# Patient Record
Sex: FEMALE | Race: WHITE | NOT HISPANIC OR LATINO | Employment: UNEMPLOYED | URBAN - METROPOLITAN AREA
[De-identification: names, ages, dates, MRNs, and addresses within clinical notes are randomized per-mention and may not be internally consistent; named-entity substitution may affect disease eponyms.]

---

## 2017-01-13 RX ORDER — CLOPIDOGREL BISULFATE 75 MG/1
75 TABLET ORAL DAILY
Qty: 90 TABLET | Refills: 0 | Status: SHIPPED | OUTPATIENT
Start: 2017-01-13 | End: 2017-04-22

## 2017-01-13 NOTE — TELEPHONE ENCOUNTER
Clopidogrel Bisulfate  75mg           Last Written Prescription Date: 05/24/2016 #90 x 1  Last filled 10/02/2016  Last Office Visit with G, P or [facility] prescribing provider:  04/06/2016 JOSHUA Howard   Future Office Visit:   none    WBC      7.7   1/2/2016  RBC     4.50   1/2/2016  HGB     13.4   1/2/2016  HCT     39.6   1/2/2016  No components found with this name: mct  MCV       88   1/2/2016  MCH     29.8   1/2/2016  MCHC     33.8   1/2/2016  RDW     14.5   1/2/2016  PLT      241   1/2/2016  AST       15   1/2/2016  ALT       16   1/2/2016  CREATININE   Date Value Ref Range Status   01/02/2016 0.87 0.52 - 1.04 mg/dL Final   ]

## 2017-04-24 NOTE — TELEPHONE ENCOUNTER
Clopidogrel  75mg           Last Written Prescription Date: 01/13/2047 #90 x 0  Last filled 01/13/2017  Last Office Visit with G, [facility] or [facility] prescribing provider:  04/02/2016  JOSHAU Howard   Future Office Visit:  none     Lab Results   Component Value Date    WBC 7.7 01/02/2016     Lab Results   Component Value Date    RBC 4.50 01/02/2016     Lab Results   Component Value Date    HGB 13.4 01/02/2016     Lab Results   Component Value Date    HCT 39.6 01/02/2016     No components found for: MCT  Lab Results   Component Value Date    MCV 88 01/02/2016     Lab Results   Component Value Date    MCH 29.8 01/02/2016     Lab Results   Component Value Date    MCHC 33.8 01/02/2016     Lab Results   Component Value Date    RDW 14.5 01/02/2016     Lab Results   Component Value Date     01/02/2016     Lab Results   Component Value Date    AST 15 01/02/2016     Lab Results   Component Value Date    ALT 16 01/02/2016     Creatinine   Date Value Ref Range Status   01/02/2016 0.87 0.52 - 1.04 mg/dL Final   ]

## 2017-04-26 RX ORDER — CLOPIDOGREL BISULFATE 75 MG/1
75 TABLET ORAL DAILY
Qty: 30 TABLET | Refills: 0 | Status: SHIPPED | OUTPATIENT
Start: 2017-04-26 | End: 2017-06-01

## 2017-05-09 DIAGNOSIS — I10 HYPERTENSION GOAL BP (BLOOD PRESSURE) < 130/80: ICD-10-CM

## 2017-05-09 DIAGNOSIS — I63.30 CEREBROVASCULAR ACCIDENT (CVA) DUE TO THROMBOSIS OF CEREBRAL ARTERY (H): ICD-10-CM

## 2017-05-09 NOTE — TELEPHONE ENCOUNTER
Hydralazine 25mg      Last Written Prescription Date: 04/06/2016  #135 x 3  Last filled 01/28/2017    Amlodipine 10mg      Last Written Prescription Date: 04/06/2016 #90 x 3  Last filled 01/28/2017    Last Office Visit with [facility], [facility] or [facility] prescribing provider:  04/06/2016 JOSHUA Howard   Future Office Visit:        BP Readings from Last 3 Encounters:   04/06/16 118/62   01/02/16 157/81   09/30/15 128/60

## 2017-05-10 RX ORDER — AMLODIPINE BESYLATE 10 MG/1
10 TABLET ORAL DAILY
Qty: 90 TABLET | Refills: 3 | Status: SHIPPED | OUTPATIENT
Start: 2017-05-10 | End: 2018-06-01

## 2017-05-10 RX ORDER — HYDRALAZINE HYDROCHLORIDE 25 MG/1
37.5 TABLET, FILM COATED ORAL DAILY
Qty: 135 TABLET | Refills: 3 | Status: SHIPPED | OUTPATIENT
Start: 2017-05-10 | End: 2018-06-01

## 2017-06-01 ENCOUNTER — OFFICE VISIT (OUTPATIENT)
Dept: FAMILY MEDICINE | Facility: CLINIC | Age: 82
End: 2017-06-01
Payer: COMMERCIAL

## 2017-06-01 VITALS
OXYGEN SATURATION: 97 % | HEART RATE: 83 BPM | SYSTOLIC BLOOD PRESSURE: 136 MMHG | TEMPERATURE: 98.2 F | WEIGHT: 235 LBS | BODY MASS INDEX: 40.12 KG/M2 | DIASTOLIC BLOOD PRESSURE: 62 MMHG | HEIGHT: 64 IN

## 2017-06-01 DIAGNOSIS — Z00.00 ROUTINE GENERAL MEDICAL EXAMINATION AT A HEALTH CARE FACILITY: Primary | ICD-10-CM

## 2017-06-01 DIAGNOSIS — Z79.1 LONG TERM (CURRENT) USE OF NON-STEROIDAL ANTI-INFLAMMATORIES (NSAID): ICD-10-CM

## 2017-06-01 DIAGNOSIS — Z23 ENCOUNTER FOR IMMUNIZATION: ICD-10-CM

## 2017-06-01 DIAGNOSIS — L71.9 ROSACEA: ICD-10-CM

## 2017-06-01 DIAGNOSIS — Z13.6 CARDIOVASCULAR SCREENING; LDL GOAL LESS THAN 130: ICD-10-CM

## 2017-06-01 DIAGNOSIS — M47.812 SPONDYLOSIS OF CERVICAL REGION WITHOUT MYELOPATHY OR RADICULOPATHY: ICD-10-CM

## 2017-06-01 DIAGNOSIS — M51.369 DDD (DEGENERATIVE DISC DISEASE), LUMBAR: ICD-10-CM

## 2017-06-01 DIAGNOSIS — R26.9 UNSPECIFIED ABNORMALITIES OF GAIT AND MOBILITY: ICD-10-CM

## 2017-06-01 DIAGNOSIS — I10 HYPERTENSION GOAL BP (BLOOD PRESSURE) < 130/80: ICD-10-CM

## 2017-06-01 DIAGNOSIS — L43.9 LICHEN PLANUS: ICD-10-CM

## 2017-06-01 DIAGNOSIS — Z79.82 LONG TERM (CURRENT) USE OF ASPIRIN: ICD-10-CM

## 2017-06-01 DIAGNOSIS — E66.01 MORBID OBESITY DUE TO EXCESS CALORIES (H): ICD-10-CM

## 2017-06-01 DIAGNOSIS — E55.9 VITAMIN D DEFICIENCY: ICD-10-CM

## 2017-06-01 DIAGNOSIS — Z86.73 PERSONAL HISTORY OF TRANSIENT ISCHEMIC ATTACK (TIA), AND CEREBRAL INFARCTION WITHOUT RESIDUAL DEFICITS: ICD-10-CM

## 2017-06-01 DIAGNOSIS — L85.3 DRY SKIN: ICD-10-CM

## 2017-06-01 DIAGNOSIS — L65.9 HAIR THINNING: ICD-10-CM

## 2017-06-01 LAB
ALBUMIN SERPL-MCNC: 4 G/DL (ref 3.4–5)
ALP SERPL-CCNC: 75 U/L (ref 40–150)
ALT SERPL W P-5'-P-CCNC: 18 U/L (ref 0–50)
ANION GAP SERPL CALCULATED.3IONS-SCNC: 8 MMOL/L (ref 3–14)
AST SERPL W P-5'-P-CCNC: 16 U/L (ref 0–45)
BILIRUB SERPL-MCNC: 0.4 MG/DL (ref 0.2–1.3)
BUN SERPL-MCNC: 26 MG/DL (ref 7–30)
CALCIUM SERPL-MCNC: 9.6 MG/DL (ref 8.5–10.1)
CHLORIDE SERPL-SCNC: 103 MMOL/L (ref 94–109)
CHOLEST SERPL-MCNC: 188 MG/DL
CO2 SERPL-SCNC: 22 MMOL/L (ref 20–32)
CREAT SERPL-MCNC: 1.03 MG/DL (ref 0.52–1.04)
CREAT UR-MCNC: 110 MG/DL
GFR SERPL CREATININE-BSD FRML MDRD: 52 ML/MIN/1.7M2
GLUCOSE SERPL-MCNC: 104 MG/DL (ref 70–99)
HDLC SERPL-MCNC: 59 MG/DL
LDLC SERPL CALC-MCNC: 107 MG/DL
MICROALBUMIN UR-MCNC: 30 MG/L
MICROALBUMIN/CREAT UR: 27 MG/G CR (ref 0–25)
NONHDLC SERPL-MCNC: 129 MG/DL
POTASSIUM SERPL-SCNC: 4.8 MMOL/L (ref 3.4–5.3)
PROT SERPL-MCNC: 7.5 G/DL (ref 6.8–8.8)
SODIUM SERPL-SCNC: 133 MMOL/L (ref 133–144)
TRIGL SERPL-MCNC: 112 MG/DL
TSH SERPL DL<=0.005 MIU/L-ACNC: 0.92 MU/L (ref 0.4–4)

## 2017-06-01 PROCEDURE — 82043 UR ALBUMIN QUANTITATIVE: CPT | Performed by: NURSE PRACTITIONER

## 2017-06-01 PROCEDURE — 80061 LIPID PANEL: CPT | Performed by: NURSE PRACTITIONER

## 2017-06-01 PROCEDURE — 82306 VITAMIN D 25 HYDROXY: CPT | Performed by: NURSE PRACTITIONER

## 2017-06-01 PROCEDURE — G0009 ADMIN PNEUMOCOCCAL VACCINE: HCPCS | Performed by: NURSE PRACTITIONER

## 2017-06-01 PROCEDURE — 84443 ASSAY THYROID STIM HORMONE: CPT | Performed by: NURSE PRACTITIONER

## 2017-06-01 PROCEDURE — 90670 PCV13 VACCINE IM: CPT | Performed by: NURSE PRACTITIONER

## 2017-06-01 PROCEDURE — 36415 COLL VENOUS BLD VENIPUNCTURE: CPT | Performed by: NURSE PRACTITIONER

## 2017-06-01 PROCEDURE — G0438 PPPS, INITIAL VISIT: HCPCS | Performed by: NURSE PRACTITIONER

## 2017-06-01 PROCEDURE — 80053 COMPREHEN METABOLIC PANEL: CPT | Performed by: NURSE PRACTITIONER

## 2017-06-01 RX ORDER — LOSARTAN POTASSIUM 100 MG/1
100 TABLET ORAL DAILY
Qty: 90 TABLET | Refills: 3 | Status: SHIPPED | OUTPATIENT
Start: 2017-06-01 | End: 2018-06-26

## 2017-06-01 RX ORDER — CLOPIDOGREL BISULFATE 75 MG/1
75 TABLET ORAL DAILY
Qty: 90 TABLET | Refills: 3 | Status: SHIPPED | OUTPATIENT
Start: 2017-06-01 | End: 2018-06-26

## 2017-06-01 RX ORDER — ROSUVASTATIN CALCIUM 5 MG/1
2.5 TABLET, COATED ORAL EVERY EVENING
Qty: 90 TABLET | Refills: 3 | Status: SHIPPED | OUTPATIENT
Start: 2017-06-01 | End: 2018-07-17

## 2017-06-01 NOTE — PATIENT INSTRUCTIONS

## 2017-06-01 NOTE — MR AVS SNAPSHOT
After Visit Summary   6/1/2017    Bárbara Vuong    MRN: [id number]           Patient Information     Date Of Birth          1935        Visit Information        Provider Department      6/1/2017 9:20 AM Martha Howard APRN [facility]        Today's Diagnoses     Routine general medical examination at a health care facility    -  1    Carotid stenosis with cerebral infarction less than 8 weeks ago        Encounter for immunization        CARDIOVASCULAR SCREENING; LDL GOAL LESS THAN 130        Hypertension goal BP (blood pressure) < 130/80        Rosacea        DDD (degenerative disc disease), lumbar        Vitamin D deficiency        Morbid obesity due to excess calories (H)        Unspecified abnormalities of gait and mobility        Long term (current) use of non-steroidal anti-inflammatories (nsaid)        Lichen planus        Long term (current) use of aspirin        Personal history of transient ischemic attack (TIA), and cerebral infarction without residual deficits        Spondylosis of cervical region without myelopathy or radiculopathy        Hair thinning        Dry skin          Care Instructions      Preventive Health Recommendations    Female Ages 65 +    Yearly exam:     See your health care provider every year in order to  o Review health changes.   o Discuss preventive care.    o Review your medicines if your doctor has prescribed any.      You no longer need a yearly Pap test unless you've had an abnormal Pap test in the past 10 years. If you have vaginal symptoms, such as bleeding or discharge, be sure to talk with your provider about a Pap test.      Every 1 to 2 years, have a mammogram.  If you are over 69, talk with your health care provider about whether or not you want to continue having screening mammograms.      Every 10 years, have a colonoscopy. Or, have a yearly FIT test (stool test). These exams will check for colon cancer.       Have a  cholesterol test every 5 years, or more often if your doctor advises it.       Have a diabetes test (fasting glucose) every three years. If you are at risk for diabetes, you should have this test more often.       At age 65, have a bone density scan (DEXA) to check for osteoporosis (brittle bone disease).    Shots:    Get a flu shot each year.    Get a tetanus shot every 10 years.    Talk to your doctor about your pneumonia vaccines. There are now two you should receive - Pneumovax (PPSV 23) and Prevnar (PCV 13).    Talk to your doctor about the shingles vaccine.    Talk to your doctor about the hepatitis B vaccine.    Nutrition:     Eat at least 5 servings of fruits and vegetables each day.      Eat whole-grain bread, whole-wheat pasta and brown rice instead of white grains and rice.      Talk to your provider about Calcium and Vitamin D.     Lifestyle    Exercise at least 150 minutes a week (30 minutes a day, 5 days a week). This will help you control your weight and prevent disease.      Limit alcohol to one drink per day.      No smoking.       Wear sunscreen to prevent skin cancer.       See your dentist twice a year for an exam and cleaning.      See your eye doctor every 1 to 2 years to screen for conditions such as glaucoma, macular degeneration and cataracts.    You are doing great.      Keep going on vacations.     No change with medication     You did get your 2nd pneumonia PCV 13 shot.     Stay well hydrated - urine should be clear.      Keep moving           Follow-ups after your visit        Who to contact     Normal or non-critical lab and imaging results will be communicated to you by [employer], letter or phone within 4 business days after the clinic has received the results. If you do not hear from us within 7 days, please contact the clinic through [employer] or phone. If you have a critical or abnormal lab result, we will notify you by phone as soon as possible.  Submit refill requests through kooldiner  "or call your pharmacy and they will forward the refill request to us. Please allow 3 business days for your refill to be completed.          If you need to speak with a  for additional information , please call: [phone]           Additional Information About Your Visit        Variable Information     Variable lets you send messages to your doctor, view your test results, renew your prescriptions, schedule appointments and more. To sign up, go to [email or web address] . Click on \"Log in\" on the left side of the screen, which will take you to the Welcome page. Then click on \"Sign up Now\" on the right side of the page.     You will be asked to enter the access code listed below, as well as some personal information. Please follow the directions to create your username and password.     Your access code is: [email or web address]  Expires: 2017 10:58 AM     Your access code will  in 90 days. If you need help or a new code, please call your [place] clinic or [phone].        Care EveryWhere ID     This is your Care EveryWhere ID. This could be used by other organizations to access your [place] medical records  [id number]        Your Vitals Were     Pulse Temperature Height Pulse Oximetry BMI (Body Mass Index)       83 98.2  F (36.8  C) (Tympanic) 5' 4\" (1.626 m) 97% 40.34 kg/m2        Blood Pressure from Last 3 Encounters:   17 136/62   16 118/62   16 157/81    Weight from Last 3 Encounters:   17 235 lb (106.6 kg)   16 212 lb (96.2 kg)   16 230 lb (104.3 kg)              We Performed the Following     Albumin Random Urine Quantitative     Comprehensive metabolic panel     Lipid Profile with reflex to direct LDL     PNEUMOCOCCAL CONJ VACCINE 13 VALENT IM     TSH with free T4 reflex     Vitamin D Deficiency          Today's Medication Changes          These changes are accurate as of: 17 10:58 AM.  If you have any questions, ask your nurse or doctor.    "            These medicines have changed or have updated prescriptions.        Dose/Directions    ALEVE 220 MG tablet   This may have changed:  See the new instructions.   Used for:  Other specified pre-operative examination, Generalized osteoarthrosis, unspecified site   Generic drug:  naproxen sodium        2 TABLETS QAM AND QPM   Refills:  0       clopidogrel 75 MG tablet   Commonly known as:  PLAVIX   This may have changed:  additional instructions   Used for:  Carotid stenosis with cerebral infarction less than 8 weeks ago   Changed by:  Martha Howard APRN CNP        Dose:  75 mg   Take 1 tablet (75 mg) by mouth daily   Quantity:  90 tablet   Refills:  3            Where to get your medicines      These medications were sent to [facility] [address] [facility] [address] [facility]  [address] [place] [id number]     Phone:  [phone]     clopidogrel 75 MG tablet    losartan 100 MG tablet    rosuvastatin 5 MG tablet                Primary Care Provider Office Phone # Fax #    CHRIS Garrett [phone]       [facility] [address] [facility]   [facility] [address]        Thank you!     Thank you for choosing [facility]  for your care. Our goal is always to provide you with excellent care. Hearing back from our patients is one way we can continue to improve our services. Please take a few minutes to complete the written survey that you may receive in the mail after your visit with us. Thank you!             Your Updated Medication List - Protect others around you: Learn how to safely use, store and throw away your medicines at www.disposemymeds.org.          This list is accurate as of: 6/1/17 10:58 AM.  Always use your most recent med list.                   Brand Name Dispense Instructions for use    ALEVE 220 MG tablet   Generic drug:  naproxen sodium      2 TABLETS QAM AND QPM       amLODIPine 10 MG tablet    NORVASC    90  tablet    Take 1 tablet (10 mg) by mouth daily       aspirin 325 MG EC tablet     100 tablet    Take 1 tablet by mouth daily.       cetirizine 5 MG tablet    zyrTEC     Take 1 tablet (5 mg) by mouth daily       clopidogrel 75 MG tablet    PLAVIX    90 tablet    Take 1 tablet (75 mg) by mouth daily       COLACE 100 MG capsule   Generic drug:  docusate sodium      2 CAPSULES DAILY       desoximetasone 0.25 % cream    TOPICORT    30 g    Apply topically 2 times daily       hydrALAZINE 25 MG tablet    APRESOLINE    135 tablet    Take 1.5 tablets (37.5 mg) by mouth daily       losartan 100 MG tablet    COZAAR    90 tablet    Take 1 tablet (100 mg) by mouth daily       rosuvastatin 5 MG tablet    CRESTOR    90 tablet    Take 0.5 tablets (2.5 mg) by mouth every evening

## 2017-06-01 NOTE — TELEPHONE ENCOUNTER
Clopidogrel  75mg           Last Written Prescription Date: 04/26/2017   #30  x0  Last filled 04/26/2017  Last Office Visit with G, [facility] or [facility] prescribing provider:  06/01/2017 JOSHUA Howard   Future Office Visit:   none    Lab Results   Component Value Date    WBC 7.7 01/02/2016     Lab Results   Component Value Date    RBC 4.50 01/02/2016     Lab Results   Component Value Date    HGB 13.4 01/02/2016     Lab Results   Component Value Date    HCT 39.6 01/02/2016     No components found for: MCT  Lab Results   Component Value Date    MCV 88 01/02/2016     Lab Results   Component Value Date    MCH 29.8 01/02/2016     Lab Results   Component Value Date    MCHC 33.8 01/02/2016     Lab Results   Component Value Date    RDW 14.5 01/02/2016     Lab Results   Component Value Date     01/02/2016     Lab Results   Component Value Date    AST 15 01/02/2016     Lab Results   Component Value Date    ALT 16 01/02/2016     Creatinine   Date Value Ref Range Status   01/02/2016 0.87 0.52 - 1.04 mg/dL Final   ]

## 2017-06-01 NOTE — PROGRESS NOTES
SUBJECTIVE:                                                            Bárbara Vuong is a 82 year old female who presents for Preventive Visit.      Are you in the first 12 months of your Medicare Part B coverage?  No    Healthy Habits:    Do you get at least three servings of calcium containing foods daily (dairy, green leafy vegetables, etc.)? yes    Amount of exercise or daily activities, outside of work: minimal    Problems taking medications regularly No    Medication side effects: No    Have you had an eye exam in the past two years? no    Do you see a dentist twice per year? 1/year    Do you have sleep apnea, excessive snoring or daytime drowsiness?no    COGNITIVE SCREEN  1) Repeat 3 items (Banana, Sunrise, Chair)    2) Clock draw:   3) 3 item recall: Recalls 3 objects  Results: 3 items recalled: COGNITIVE IMPAIRMENT LESS LIKELY    Mini-CogTM Copyright S Rosa. Licensed by the author for use in [facility]; reprinted with permission ([email or web address]). All rights reserved.            *C/o lower arm pain when she uses her walker    Reviewed and updated as needed this visit by clinical staff  Tobacco  Allergies  Meds  Problems  Med Hx  Surg Hx  Fam Hx  Soc Hx          Reviewed and updated as needed this visit by Provider  Meds  Problems        Social History   Substance Use Topics     Smoking status: Former Smoker     Packs/day: 1.00     Years: 7.00     Types: Cigarettes     Quit date: 10/26/1987     Smokeless tobacco: Never Used     Alcohol use 4.2 oz/week     7 Standard drinks or equivalent per week      Comment: one drink per night       The patient does not drink >3 drinks per day nor >7 drinks per week.    Today's PHQ-2 Score:   PHQ-2 ( 1999 Pfizer) 6/1/2017 4/6/2016   Q1: Little interest or pleasure in doing things 0 1   Q2: Feeling down, depressed or hopeless 0 0   PHQ-2 Score 0 1       Do you feel safe in your environment - Yes    Do you have a Health Care Directive?: Yes: Patient  [facility] has Advance Directive and will bring in a copy to clinic.    Current providers sharing in care for this patient include:   Patient Care Team:  Martha Howard APRN CNP as PCP - General  Aramis Ballard MD as Referring Physician (Neurology)  Dolores Almonte LSW as   Cathy Krause, RN as   Anel Cabrera, RN as Nurse Coordinator (Neurology)      Hearing impairment: No    Ability to successfully perform activities of daily living: Yes, no assistance needed     Fall risk:  Fallen 2 or more times in the past year?: Yes  Any fall with injury in the past year?: No  Timed Up and Go Test (>13.5 is fall risk; contact physician) :  (unable)    Home safety:  none identified      The following health maintenance items are reviewed in Epic and correct as of today:  Health Maintenance   Topic Date Due     DEXA SCAN SCREENING (SYSTEM ASSIGNED)  02/11/2002     PNEUMOCOCCAL (2 of 2 - PCV13) 01/24/2004     MICROALBUMIN Q1 YEAR  12/11/2013     FALL RISK ASSESSMENT  06/12/2016     INFLUENZA VACCINE (SYSTEM ASSIGNED)  09/01/2017     ADVANCE DIRECTIVE PLANNING Q5 YRS  01/08/2018     TETANUS IMMUNIZATION (SYSTEM ASSIGNED)  07/01/2018         Pneumonia Vaccine:Adults age 65+ who received Pneumovax (PPSV23) at 65 years or older: Should be given PCV13 > 1 year after their most recent PPSV23     ROS:  C: NEGATIVE for fever, chills, change in weight  INTEGUMENTARY/SKIN: NEGATIVE for worrisome rashes, moles or lesions.  Does have skin tags. Hair is thinning   E/M: NEGATIVE for ear, mouth and throat problems  R: NEGATIVE for significant cough or SOB  CV: NEGATIVE for chest pain, palpitations or peripheral edema  GI: NEGATIVE for nausea, abdominal pain, heartburn, or change in bowel habits  : no urinary problems   MUSCULOSKELETAL: POSITIVE  for arthralgias does have her house set up with railings,  Does have an elevator chair and has a walk in shower. Uses a walker.  Does have people who come  in to help her,  Kids come to visit over the week-end .  Does have bilat knee problems   NEURO: NEGATIVE for weakness, dizziness or paresthesias  ENDOCRINE: NEGATIVE for temperature intolerance, skin/hair changes and POSITIVE  for hair loss and hair thinning.    HEME/ALLERGY/IMMUNE: NEGATIVE for bleeding problems  PSYCHIATRIC: NEGATIVE for changes in mood or affect and put her  in a nursing home as his Alzheimer is getting worse. She and her granddaughter went on a cruise this past winter    Problem list, Medication list, Allergies, and Medical/Social/Surgical histories reviewed in [facility] and updated as appropriate.  Labs reviewed in EPIC  BP Readings from Last 3 Encounters:   06/01/17 136/62   04/06/16 118/62   01/02/16 157/81    Wt Readings from Last 3 Encounters:   06/01/17 235 lb (106.6 kg)   04/06/16 212 lb (96.2 kg)   01/01/16 230 lb (104.3 kg)                  Patient Active Problem List   Diagnosis     GENERAL OSTEOARTHROSIS     CARDIOVASCULAR SCREENING; LDL GOAL LESS THAN 130     Hypertension goal BP (blood pressure) < 130/80     Rosacea     Lichen planus     Health Care Home     Advanced directives, HC info packet sent 1/8/2013     DDD (degenerative disc disease), lumbar     Morbidly obese (H)     Vitamin D deficiency     History of colonic polyps     Stroke (H)     Carotid stenosis with cerebral infarction less than 8 weeks ago     Carotid stenosis     Stroke, embolic (H)     Long term (current) use of aspirin     Unspecified abnormalities of gait and mobility     Long term (current) use of non-steroidal anti-inflammatories (nsaid)     Long term (current) use of antithrombotics/antiplatelets     Arthrodesis status     Presence of right artificial hip joint     Personal history of transient ischemic attack (TIA), and cerebral infarction without residual deficits     Presence of left artificial shoulder joint     Spondylosis of cervical region without myelopathy or radiculopathy     Presence of right  artificial shoulder joint     Past Surgical History:   Procedure Laterality Date     C NONSPECIFIC PROCEDURE      back surgery     C NONSPECIFIC PROCEDURE      elbow surgery     C NONSPECIFIC PROCEDURE      hip replacement, right     C NONSPECIFIC PROCEDURE      fx elbow       Social History   Substance Use Topics     Smoking status: Former Smoker     Packs/day: 1.00     Years: 7.00     Types: Cigarettes     Quit date: 10/26/1987     Smokeless tobacco: Never Used     Alcohol use 4.2 oz/week     7 Standard drinks or equivalent per week      Comment: one drink per night     Family History   Problem Relation Age of Onset     C.A.D. Mother      Hypertension Mother      AGE RELATED         Current Outpatient Prescriptions   Medication Sig Dispense Refill     clopidogrel (PLAVIX) 75 MG tablet Take 1 tablet (75 mg) by mouth daily 90 tablet 3     amLODIPine (NORVASC) 10 MG tablet Take 1 tablet (10 mg) by mouth daily 90 tablet 3     rosuvastatin (CRESTOR) 5 MG tablet Take 0.5 tablets (2.5 mg) by mouth every evening 90 tablet 1     losartan (COZAAR) 100 MG tablet Take 1 tablet (100 mg) by mouth daily 90 tablet 2     aspirin 325 MG EC tablet Take 1 tablet by mouth daily. 100 tablet 3     ALEVE 220 MG OR TABS 2 TABLETS QAM AND QPM (Patient taking differently: 1-2 TABLETS twice daily prn for pain)       COLACE 100 MG OR CAPS 2 CAPSULES DAILY       hydrALAZINE (APRESOLINE) 25 MG tablet Take 1.5 tablets (37.5 mg) by mouth daily 135 tablet 3     [DISCONTINUED] clopidogrel (PLAVIX) 75 MG tablet Take 1 tablet (75 mg) by mouth daily (Needs follow-up appointment for this medication) 30 tablet 0     desoximetasone (TOPICORT) 0.25 % cream Apply topically 2 times daily (Patient not taking: Reported on 6/1/2017) 30 g 0     cetirizine (ZYRTEC) 5 MG tablet Take 1 tablet (5 mg) by mouth daily (Patient not taking: Reported on 6/1/2017)       Allergies   Allergen Reactions     Penicillins      OBJECTIVE:                                           "                  /62  Pulse 83  Temp 98.2  F (36.8  C) (Tympanic)  Ht 5' 4\" (1.626 m)  Wt 235 lb (106.6 kg)  SpO2 97%  BMI 40.34 kg/m2 Estimated body mass index is 40.34 kg/(m^2) as calculated from the following:    Height as of this encounter: 5' 4\" (1.626 m).    Weight as of this encounter: 235 lb (106.6 kg).  EXAM:   GENERAL APPEARANCE: healthy, alert and no distress  EYES: Eyes grossly normal to inspection, PERRL and conjunctivae and sclerae normal  HENT: ear canals and TM's normal, nose and mouth without ulcers or lesions, oropharynx clear and oral mucous membranes moist  NECK: no adenopathy, no asymmetry, masses, or scars and thyroid normal to palpation  RESP: lungs clear to auscultation - no rales, rhonchi or wheezes  BREAST: normal without masses, tenderness or nipple discharge and no palpable axillary masses or adenopathy  CV: regular rate and rhythm, normal S1 S2, no S3 or S4, no murmur, click or rub, no peripheral edema and peripheral pulses strong  ABDOMEN: soft, nontender, no hepatosplenomegaly, no masses and bowel sounds normal   (female): normal female external genitalia, normal urethral meatus, vaginal mucosal atrophy noted, normal cervix, adnexae, and uterus without masses or abnormal discharge  MS: no musculoskeletal defects are noted and gait is age appropriate without ataxia  SKIN: no suspicious lesions or rashes  NEURO: Normal strength and tone, sensory exam grossly normal, mentation intact, speech normal, cranial nerves 2-12 intact and oriented times 3  PSYCH: mentation appears normal and affect normal/bright    ASSESSMENT / PLAN:                                                              ASSESSMENT/PLAN:      ICD-10-CM    1. Routine general medical examination at a health care facility Z00.00    2. Carotid stenosis with cerebral infarction less than 8 weeks ago I69.30 clopidogrel (PLAVIX) 75 MG tablet     Lipid Profile with reflex to direct LDL     Comprehensive metabolic " panel   3. Encounter for immunization Z23 PNEUMOCOCCAL CONJ VACCINE 13 VALENT IM   4. CARDIOVASCULAR SCREENING; LDL GOAL LESS THAN 130 Z13.6 Lipid Profile with reflex to direct LDL     rosuvastatin (CRESTOR) 5 MG tablet   5. Hypertension goal BP (blood pressure) < 130/80 I10 Comprehensive metabolic panel     Albumin Random Urine Quantitative     losartan (COZAAR) 100 MG tablet   6. Rosacea L71.9    7. DDD (degenerative disc disease), lumbar M51.36    8. Vitamin D deficiency E55.9 Vitamin D Deficiency   9. Morbid obesity due to excess calories (H) E66.01    10. Unspecified abnormalities of gait and mobility R26.9    11. Long term (current) use of non-steroidal anti-inflammatories (nsaid) Z79.1    12. Lichen planus L43.9    13. Long term (current) use of aspirin Z79.82    14. Personal history of transient ischemic attack (TIA), and cerebral infarction without residual deficits Z86.73    15. Spondylosis of cervical region without myelopathy or radiculopathy M47.812    16. Hair thinning L65.9 TSH with free T4 reflex   17. Dry skin L85.3 TSH with free T4 reflex       Patient Instructions     Preventive Health Recommendations    Female Ages 65 +    Yearly exam:     See your health care provider every year in order to  o Review health changes.   o Discuss preventive care.    o Review your medicines if your doctor has prescribed any.      You no longer need a yearly Pap test unless you've had an abnormal Pap test in the past 10 years. If you have vaginal symptoms, such as bleeding or discharge, be sure to talk with your provider about a Pap test.      Every 1 to 2 years, have a mammogram.  If you are over 69, talk with your health care provider about whether or not you want to continue having screening mammograms.      Every 10 years, have a colonoscopy. Or, have a yearly FIT test (stool test). These exams will check for colon cancer.       Have a cholesterol test every 5 years, or more often if your doctor advises it.  "      Have a diabetes test (fasting glucose) every three years. If you are at risk for diabetes, you should have this test more often.       At age 65, have a bone density scan (DEXA) to check for osteoporosis (brittle bone disease).    Shots:    Get a flu shot each year.    Get a tetanus shot every 10 years.    Talk to your doctor about your pneumonia vaccines. There are now two you should receive - Pneumovax (PPSV 23) and Prevnar (PCV 13).    Talk to your doctor about the shingles vaccine.    Talk to your doctor about the hepatitis B vaccine.    Nutrition:     Eat at least 5 servings of fruits and vegetables each day.      Eat whole-grain bread, whole-wheat pasta and brown rice instead of white grains and rice.      Talk to your provider about Calcium and Vitamin D.     Lifestyle    Exercise at least 150 minutes a week (30 minutes a day, 5 days a week). This will help you control your weight and prevent disease.      Limit alcohol to one drink per day.      No smoking.       Wear sunscreen to prevent skin cancer.       See your dentist twice a year for an exam and cleaning.      See your eye doctor every 1 to 2 years to screen for conditions such as glaucoma, macular degeneration and cataracts.    You are doing great.      Keep going on vacations.     No change with medication     You did get your 2nd pneumonia PCV 13 shot.     Stay well hydrated - urine should be clear.      Keep moving             End of Life Planning:  Patient currently has an advanced directive: Yes.  Practitioner is supportive of decision.    COUNSELING:  Reviewed preventive health counseling, as reflected in patient instructions       Regular exercise       Healthy diet/nutrition       Vision screening       Advanced Planning         Estimated body mass index is 40.34 kg/(m^2) as calculated from the following:    Height as of this encounter: 5' 4\" (1.626 m).    Weight as of this encounter: 235 lb (106.6 kg).  Weight management plan: Patient " was referred to their PCP to discuss a diet and exercise plan.   reports that she quit smoking about 29 years ago. Her smoking use included Cigarettes. She has a 7.00 pack-year smoking history. She has never used smokeless tobacco.      Appropriate preventive services were discussed with this patient, including applicable screening as appropriate for cardiovascular disease, diabetes, osteopenia/osteoporosis, and glaucoma.  As appropriate for age/gender, discussed screening for colorectal cancer, prostate cancer, breast cancer, and cervical cancer. Checklist reviewing preventive services available has been given to the patient.    Reviewed patients plan of care and provided an AVS. The Intermediate Care Plan ( asthma action plan, low back pain action plan, and migraine action plan) for Bárbara meets the Care Plan requirement. This Care Plan has been established and reviewed with the Patient.    Counseling Resources:  ATP IV Guidelines  Pooled Cohorts Equation Calculator  Breast Cancer Risk Calculator  FRAX Risk Assessment  ICSI Preventive Guidelines  Dietary Guidelines for Americans, 2010  USDA's MyPlate  ASA Prophylaxis  Lung CA Screening    HIREN NOLAN NP, APRN CNP  [facility]

## 2017-06-01 NOTE — LETTER
"[facility]  [address] [place] [address]  [id number]  [phone]      June 12, 2017      Bárbaraeyad Vuong  [address] [address]              Dear MsIsrael Vuong,    The results of your recent kidney test, liver tests, thyroid tests (checks thyroid function and body metabolism), electrolytes (sodium, potassium, etc.) This measures body salts which are affected by medications and kidney function., cholesterol , HDL \"Good Cholesterol\", triglycerides } were normal.     The results of your recent glucose (a screening test for diabetes), LDL \"Bad Cholesterol\"  , urine microalbumin, Vitamin D were  abnormal.     Your glucose and LDL are just out of normal range.  =  Limit the simple sugars and the simple carbohydrates     Your Vitamin D level is low please  Please get an over the counter Vitamin D supplement of 2000 units. Take 4000 units during the fall/winter months and 2000 units during the spring/summer.       Please note that test explanations are brief and do not reflect all diagnostic uses.     Please make a follow-up appointment if you have additional questions.       Sincerely,    KATIE Chen/GABINO CMA    "

## 2017-06-02 LAB — DEPRECATED CALCIDIOL+CALCIFEROL SERPL-MC: 16 UG/L (ref 20–75)

## 2017-06-02 RX ORDER — CLOPIDOGREL BISULFATE 75 MG/1
TABLET ORAL
Qty: 30 TABLET | Refills: 0 | OUTPATIENT
Start: 2017-06-02

## 2018-06-01 DIAGNOSIS — I10 HYPERTENSION GOAL BP (BLOOD PRESSURE) < 130/80: ICD-10-CM

## 2018-06-01 DIAGNOSIS — I63.30 CEREBROVASCULAR ACCIDENT (CVA) DUE TO THROMBOSIS OF CEREBRAL ARTERY (H): ICD-10-CM

## 2018-06-04 RX ORDER — AMLODIPINE BESYLATE 10 MG/1
TABLET ORAL
Qty: 90 TABLET | Refills: 1 | Status: SHIPPED | OUTPATIENT
Start: 2018-06-04 | End: 2018-12-09

## 2018-06-04 RX ORDER — HYDRALAZINE HYDROCHLORIDE 25 MG/1
TABLET, FILM COATED ORAL
Qty: 135 TABLET | Refills: 1 | Status: SHIPPED | OUTPATIENT
Start: 2018-06-04 | End: 2018-12-24

## 2018-06-04 NOTE — TELEPHONE ENCOUNTER
Prescription approved per [facility] Refill Protocol or patient Primary care provider (PCP)  KIP Ortega RN/Juve Sher

## 2018-06-04 NOTE — TELEPHONE ENCOUNTER
"Requested Prescriptions   Pending Prescriptions Disp Refills     hydrALAZINE (APRESOLINE) 25 MG tablet [Pharmacy Med Name: HydrALAZINE HCl Oral Tablet 25 MG] 135 tablet 2    Last Written Prescription Date:  05/10/2017 #135 x 3  Last filled 02/13/2018  Last office visit: 6/1/2017 JOSHUA Howard   Future Office Visit:  None   Sig: TAKE 1.5 TABLETS BY MOUTH ONCE DAILY.    There is no refill protocol information for this order        amLODIPine (NORVASC) 10 MG tablet [Pharmacy Med Name: AmLODIPine Besylate Oral Tablet 10 MG] 90 tablet 2    Last Written Prescription Date:  05/10/2017 #90 x 3  Last filled 02/13/2018  Last office visit: 6/1/2017 JOSHUA Howard   Future Office Visit:  None   Sig: TAKE ONE TABLET BY MOUTH ONE TIME DAILY    Calcium Channel Blockers Protocol  Failed    6/1/2018  8:16 PM       Failed - Blood pressure under 140/90 in past 12 months    BP Readings from Last 3 Encounters:   06/01/17 136/62   04/06/16 118/62   01/02/16 157/81                Passed - Recent (12 mo) or future (30 days) visit within the authorizing provider's specialty    Patient had office visit in the last 12 months or has a visit in the next 30 days with authorizing provider or within the authorizing provider's specialty.  See \"Patient Info\" tab in inbasket, or \"Choose Columns\" in Meds & Orders section of the refill encounter.           Passed - Patient is age 18 or older       Passed - No active pregnancy on record       Passed - Normal serum creatinine on file in past 12 months    Recent Labs   Lab Test  06/01/17   1109   06/30/15   1459   CR  1.03   < >   --    CRPOC   --    --   1.1    < > = values in this interval not displayed.            Passed - No positive pregnancy test in past 12 months          "

## 2018-06-26 DIAGNOSIS — I10 HYPERTENSION GOAL BP (BLOOD PRESSURE) < 130/80: ICD-10-CM

## 2018-06-26 DIAGNOSIS — I63.9 STROKE, EMBOLIC (H): ICD-10-CM

## 2018-06-26 DIAGNOSIS — I63.9 STROKE (H): Primary | ICD-10-CM

## 2018-06-26 DIAGNOSIS — I65.29 CAROTID STENOSIS: ICD-10-CM

## 2018-06-26 DIAGNOSIS — Z79.02 LONG TERM (CURRENT) USE OF ANTITHROMBOTICS/ANTIPLATELETS: ICD-10-CM

## 2018-06-27 RX ORDER — CLOPIDOGREL BISULFATE 75 MG/1
TABLET ORAL
Qty: 30 TABLET | Refills: 0 | Status: SHIPPED | OUTPATIENT
Start: 2018-06-27 | End: 2018-07-25

## 2018-06-27 RX ORDER — LOSARTAN POTASSIUM 100 MG/1
TABLET ORAL
Qty: 30 TABLET | Refills: 0 | Status: SHIPPED | OUTPATIENT
Start: 2018-06-27 | End: 2018-08-02

## 2018-06-27 NOTE — TELEPHONE ENCOUNTER
Medication is being filled for 1 time refill only due to:   Over due for office visit and/or labs   KIP Ortega  RN/Juve Sher

## 2018-07-06 ENCOUNTER — TELEPHONE (OUTPATIENT)
Dept: FAMILY MEDICINE | Facility: CLINIC | Age: 83
End: 2018-07-06

## 2018-07-06 DIAGNOSIS — H10.33 ACUTE BACTERIAL CONJUNCTIVITIS OF BOTH EYES: Primary | ICD-10-CM

## 2018-07-06 RX ORDER — POLYMYXIN B SULFATE AND TRIMETHOPRIM 1; 10000 MG/ML; [USP'U]/ML
1 SOLUTION OPHTHALMIC
Qty: 1 BOTTLE | Refills: 0 | Status: SHIPPED | OUTPATIENT
Start: 2018-07-06 | End: 2018-07-13

## 2018-07-06 NOTE — TELEPHONE ENCOUNTER
I spoke to Bárbara.  Her left eye has been draining a bit and is itchy and irritated. Her PCA told her she has pink eye because the left one is red and irritated looking. . Bárbara says she has allergies and sometimes her eyes are bothered by that. . Her eye lid is slightly swollen and she does have some goop coming out of it.    Patient denies fever. She is wondering if she should have some eye drops for it. She is open to having a telephone visit with Martha. [facility]ses [facility].    I also let her know she could try moist compresses to the eye four times daily. Lynn Green RN

## 2018-07-06 NOTE — TELEPHONE ENCOUNTER
Chief Complaint   Patient presents with    Hypertension     I have reviewed the patient's medical history in detail and updated the computerized patient record. Health Maintenance reviewed. 1. Have you been to the ER, urgent care clinic since your last visit? Hospitalized since your last visit?no    2. Have you seen or consulted any other health care providers outside of the [address] [place] [address] since your last visit? Include any pap smears or colon screening.  No    Encouraged pt to discuss pt's wishes with spouse/partner/family and bring them in the next appt to follow thru with the Advanced Directive Pt states her PCAs told her she has pink eye. She is requesting advice from Martha, there are no open appts.  Please triage.      Carlita Ricks, [place]

## 2018-07-17 DIAGNOSIS — Z13.6 CARDIOVASCULAR SCREENING; LDL GOAL LESS THAN 130: ICD-10-CM

## 2018-07-18 RX ORDER — ROSUVASTATIN CALCIUM 5 MG/1
TABLET, COATED ORAL
Qty: 45 TABLET | Refills: 0 | Status: SHIPPED | OUTPATIENT
Start: 2018-07-18 | End: 2018-08-02

## 2018-07-18 NOTE — TELEPHONE ENCOUNTER
Medication is being filled for 1 time refill only due to:  Patient needs to be seen because it has been more than one year since last visit. Lynn Green RN

## 2018-07-18 NOTE — TELEPHONE ENCOUNTER
"Requested Prescriptions   Pending Prescriptions Disp Refills     rosuvastatin (CRESTOR) 5 MG tablet [Pharmacy Med Name: Rosuvastatin Calcium Oral Tablet 5 MG] 45 tablet 2    Last Written Prescription Date:  6/1/17  Last Fill Quantity: 90,  # refills: 3   Last office visit: 6/1/2017 with prescribing provider:  6/1/17 phil   Future Office Visit:   Next 5 appointments (look out 90 days)     Aug 02, 2018  9:20 AM CDT   PHYSICAL with CHRIS Garrett CNP   [facility]    [address] [id number] [phone]                  Sig: TAKE 0.5 TABLETS(2.5MG) BY MOUTH EVERY evening    Statins Protocol Failed    7/17/2018  4:46 PM       Failed - LDL on file in past 12 months    Recent Labs   Lab Test  06/01/17   1109   LDL  107*            Passed - No abnormal creatine kinase in past 12 months    No lab results found.            Passed - Recent (12 mo) or future (30 days) visit within the authorizing provider's specialty    Patient had office visit in the last 12 months or has a visit in the next 30 days with authorizing provider or within the authorizing provider's specialty.  See \"Patient Info\" tab in inbasket, or \"Choose Columns\" in Meds & Orders section of the refill encounter.           Passed - Patient is age 18 or older       Passed - No active pregnancy on record       Passed - No positive pregnancy test in past 12 months          "

## 2018-07-25 DIAGNOSIS — I65.29 CAROTID STENOSIS: ICD-10-CM

## 2018-07-25 DIAGNOSIS — Z79.02 LONG TERM (CURRENT) USE OF ANTITHROMBOTICS/ANTIPLATELETS: ICD-10-CM

## 2018-07-25 DIAGNOSIS — I63.9 STROKE, EMBOLIC (H): ICD-10-CM

## 2018-07-25 DIAGNOSIS — I63.9 STROKE (H): ICD-10-CM

## 2018-07-25 RX ORDER — CLOPIDOGREL BISULFATE 75 MG/1
TABLET ORAL
Qty: 30 TABLET | Refills: 0 | Status: SHIPPED | OUTPATIENT
Start: 2018-07-25 | End: 2018-08-02

## 2018-07-25 NOTE — TELEPHONE ENCOUNTER
Medication is being filled for 1 time refill only due to:   Over due for office visit and/or labs has future appt sched short fill   KIP Ortega  RN/Juve Sher

## 2018-07-25 NOTE — TELEPHONE ENCOUNTER
"Requested Prescriptions   Pending Prescriptions Disp Refills     clopidogrel (PLAVIX) 75 MG tablet [Pharmacy Med Name: Clopidogrel Bisulfate Oral Tablet 75 MG] 30 tablet 0    Last Written Prescription Date:  6/27/18  Last Fill Quantity: 30,  # refills: 0   Last office visit: 6/1/2017 with prescribing provider:  6/1/17 phil   Future Office Visit:   Next 5 appointments (look out 90 days)     Aug 02, 2018  9:20 AM CDT   PHYSICAL with CHRIS Garrett CNP   [facility]    [address] [address] [phone]                  Sig: TAKE 1 TABLET(75MG) BY MOUTH DAILY    Plavix Failed    7/25/2018  8:56 AM       Failed - Normal HGB on file in past 12 months    Recent Labs   Lab Test  01/02/16   0021   HGB  13.4              Failed - Normal Platelets on file in past 12 months    Recent Labs   Lab Test  01/02/16   0543   PLT  241              Passed - No active PPI on record unless is Protonix       Passed - Recent (12 mo) or future (30 days) visit within the authorizing provider's specialty    Patient had office visit in the last 12 months or has a visit in the next 30 days with authorizing provider or within the authorizing provider's specialty.  See \"Patient Info\" tab in inbasket, or \"Choose Columns\" in Meds & Orders section of the refill encounter.           Passed - Patient is age 18 or older       Passed - No active pregnancy on record       Passed - No positive pregnancy test in past 12 months          "

## 2018-08-02 ENCOUNTER — OFFICE VISIT (OUTPATIENT)
Dept: FAMILY MEDICINE | Facility: CLINIC | Age: 83
End: 2018-08-02
Payer: COMMERCIAL

## 2018-08-02 VITALS
TEMPERATURE: 97.8 F | HEART RATE: 80 BPM | DIASTOLIC BLOOD PRESSURE: 76 MMHG | WEIGHT: 241.4 LBS | SYSTOLIC BLOOD PRESSURE: 134 MMHG | BODY MASS INDEX: 41.44 KG/M2 | RESPIRATION RATE: 14 BRPM

## 2018-08-02 DIAGNOSIS — I10 HYPERTENSION GOAL BP (BLOOD PRESSURE) < 130/80: ICD-10-CM
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
## 2018-08-02 DIAGNOSIS — Z79.02 LONG TERM (CURRENT) USE OF ANTITHROMBOTICS/ANTIPLATELETS: ICD-10-CM

## 2018-08-02 DIAGNOSIS — Z00.00 ROUTINE GENERAL MEDICAL EXAMINATION AT A HEALTH CARE FACILITY: Primary | ICD-10-CM

## 2018-08-02 DIAGNOSIS — Z13.6 CARDIOVASCULAR SCREENING; LDL GOAL LESS THAN 130: ICD-10-CM

## 2018-08-02 DIAGNOSIS — S91.051A CAT BITE OF RIGHT ANKLE, INITIAL ENCOUNTER: ICD-10-CM

## 2018-08-02 LAB
ANION GAP SERPL CALCULATED.3IONS-SCNC: 6 MMOL/L (ref 3–14)
BUN SERPL-MCNC: 20 MG/DL (ref 7–30)
CALCIUM SERPL-MCNC: 9.5 MG/DL (ref 8.5–10.1)
CHLORIDE SERPL-SCNC: 99 MMOL/L (ref 94–109)
CHOLEST SERPL-MCNC: 151 MG/DL
CO2 SERPL-SCNC: 25 MMOL/L (ref 20–32)
CREAT SERPL-MCNC: 1.04 MG/DL (ref 0.52–1.04)
GFR SERPL CREATININE-BSD FRML MDRD: 51 ML/MIN/1.7M2
GLUCOSE SERPL-MCNC: 113 MG/DL (ref 70–99)
HDLC SERPL-MCNC: 56 MG/DL
LDLC SERPL CALC-MCNC: 77 MG/DL
NONHDLC SERPL-MCNC: 95 MG/DL
POTASSIUM SERPL-SCNC: 5.1 MMOL/L (ref 3.4–5.3)
SODIUM SERPL-SCNC: 130 MMOL/L (ref 133–144)
TRIGL SERPL-MCNC: 90 MG/DL

## 2018-08-02 PROCEDURE — 80061 LIPID PANEL: CPT | Performed by: NURSE PRACTITIONER

## 2018-08-02 PROCEDURE — 80048 BASIC METABOLIC PNL TOTAL CA: CPT | Performed by: NURSE PRACTITIONER

## 2018-08-02 PROCEDURE — 36415 COLL VENOUS BLD VENIPUNCTURE: CPT | Performed by: NURSE PRACTITIONER

## 2018-08-02 PROCEDURE — 99213 OFFICE O/P EST LOW 20 MIN: CPT | Mod: 25 | Performed by: NURSE PRACTITIONER

## 2018-08-02 PROCEDURE — G0439 PPPS, SUBSEQ VISIT: HCPCS | Performed by: NURSE PRACTITIONER

## 2018-08-02 RX ORDER — CEPHALEXIN 500 MG/1
500 CAPSULE ORAL 3 TIMES DAILY
Qty: 30 CAPSULE | Refills: 0 | Status: SHIPPED | OUTPATIENT
Start: 2018-08-02 | End: 2019-08-05

## 2018-08-02 RX ORDER — LOSARTAN POTASSIUM 100 MG/1
TABLET ORAL
Qty: 90 TABLET | Refills: 3 | Status: SHIPPED | OUTPATIENT
Start: 2018-08-02 | End: 2019-08-05

## 2018-08-02 RX ORDER — ROSUVASTATIN CALCIUM 5 MG/1
TABLET, COATED ORAL
Qty: 45 TABLET | Refills: 3 | Status: SHIPPED | OUTPATIENT
Start: 2018-08-02 | End: 2019-08-05

## 2018-08-02 RX ORDER — CLOPIDOGREL BISULFATE 75 MG/1
TABLET ORAL
Qty: 90 TABLET | Refills: 3 | Status: SHIPPED | OUTPATIENT
Start: 2018-08-02 | End: 2019-06-20

## 2018-08-02 ASSESSMENT — PAIN SCALES - GENERAL: PAINLEVEL: NO PAIN (0)

## 2018-08-02 NOTE — PATIENT INSTRUCTIONS
Preventive Health Recommendations    Female Ages 65 +    Yearly exam:     See your health care provider every year in order to  o Review health changes.   o Discuss preventive care.    o Review your medicines if your doctor has prescribed any.      You no longer need a yearly Pap test unless you've had an abnormal Pap test in the past 10 years. If you have vaginal symptoms, such as bleeding or discharge, be sure to talk with your provider about a Pap test.      Every 1 to 2 years, have a mammogram.  If you are over 69, talk with your health care provider about whether or not you want to continue having screening mammograms.      Every 10 years, have a colonoscopy. Or, have a yearly FIT test (stool test). These exams will check for colon cancer.       Have a cholesterol test every 5 years, or more often if your doctor advises it.       Have a diabetes test (fasting glucose) every three years. If you are at risk for diabetes, you should have this test more often.       At age 65, have a bone density scan (DEXA) to check for osteoporosis (brittle bone disease).    Shots:    Get a flu shot each year.    Get a tetanus shot every 10 years.    Talk to your doctor about your pneumonia vaccines. There are now two you should receive - Pneumovax (PPSV 23) and Prevnar (PCV 13).    Talk to your pharmacist about the shingles vaccine.    Talk to your doctor about the hepatitis B vaccine.    Nutrition:     Eat at least 5 servings of fruits and vegetables each day.      Eat whole-grain bread, whole-wheat pasta and brown rice instead of white grains and rice.      Get adequate Calcium and Vitamin D.     Lifestyle    Exercise at least 150 minutes a week (30 minutes a day, 5 days a week). This will help you control your weight and prevent disease.      Limit alcohol to one drink per day.      No smoking.       Wear sunscreen to prevent skin cancer.       See your dentist twice a year for an exam and cleaning.      See your eye doctor  every 1 to 2 years to screen for conditions such as glaucoma, macular degeneration and cataracts.      When watching  TV during the commercials MOVE     Stay well hydrated - urine should be clear.      For the cat bite.     Elevate the right leg when sitting   Start the antibiotic      At this time no change, with medication       Have FUN on the cruise

## 2018-08-02 NOTE — MR AVS SNAPSHOT
After Visit Summary   8/2/2018    Bárbara Vuong    MRN: [id number]           Patient Information     Date Of Birth          1935        Visit Information        Provider Department      8/2/2018 9:20 AM Martha Howard APRN [facility]        Today's Diagnoses     Routine general medical examination at a health care facility    -  1    Hypertension goal BP (blood pressure) < 130/80        CARDIOVASCULAR SCREENING; LDL GOAL LESS THAN 130        Cat bite of right ankle, initial encounter        Long term (current) use of antithrombotics/antiplatelets          Care Instructions      Preventive Health Recommendations    Female Ages 65 +    Yearly exam:     See your health care provider every year in order to  o Review health changes.   o Discuss preventive care.    o Review your medicines if your doctor has prescribed any.      You no longer need a yearly Pap test unless you've had an abnormal Pap test in the past 10 years. If you have vaginal symptoms, such as bleeding or discharge, be sure to talk with your provider about a Pap test.      Every 1 to 2 years, have a mammogram.  If you are over 69, talk with your health care provider about whether or not you want to continue having screening mammograms.      Every 10 years, have a colonoscopy. Or, have a yearly FIT test (stool test). These exams will check for colon cancer.       Have a cholesterol test every 5 years, or more often if your doctor advises it.       Have a diabetes test (fasting glucose) every three years. If you are at risk for diabetes, you should have this test more often.       At age 65, have a bone density scan (DEXA) to check for osteoporosis (brittle bone disease).    Shots:    Get a flu shot each year.    Get a tetanus shot every 10 years.    Talk to your doctor about your pneumonia vaccines. There are now two you should receive - Pneumovax (PPSV 23) and Prevnar (PCV 13).    Talk to your  pharmacist about the shingles vaccine.    Talk to your doctor about the hepatitis B vaccine.    Nutrition:     Eat at least 5 servings of fruits and vegetables each day.      Eat whole-grain bread, whole-wheat pasta and brown rice instead of white grains and rice.      Get adequate Calcium and Vitamin D.     Lifestyle    Exercise at least 150 minutes a week (30 minutes a day, 5 days a week). This will help you control your weight and prevent disease.      Limit alcohol to one drink per day.      No smoking.       Wear sunscreen to prevent skin cancer.       See your dentist twice a year for an exam and cleaning.      See your eye doctor every 1 to 2 years to screen for conditions such as glaucoma, macular degeneration and cataracts.      When watching  TV during the commercials MOVE     Stay well hydrated - urine should be clear.      For the cat bite.     Elevate the right leg when sitting   Start the antibiotic      At this time no change, with medication       Have FUN on the cruise           Follow-ups after your visit        Who to contact     Normal or non-critical lab and imaging results will be communicated to you by MyChart, letter or phone within 4 business days after the clinic has received the results. If you do not hear from us within 7 days, please contact the clinic through MyChart or phone. If you have a critical or abnormal lab result, we will notify you by phone as soon as possible.  Submit refill requests through [employer] or call your pharmacy and they will forward the refill request to us. Please allow 3 business days for your refill to be completed.          If you need to speak with a  for additional information , please call: [phone]           Additional Information About Your Visit        Care EveryWhere ID     This is your Care EveryWhere ID. This could be used by other organizations to access your [facility] medical records  [id number]        Your Vitals Were     Pulse  Temperature Respirations BMI (Body Mass Index)          80 97.8  F (36.6  C) (Oral) 14 41.44 kg/m2         Blood Pressure from Last 3 Encounters:   08/02/18 134/76   06/01/17 136/62   04/06/16 118/62    Weight from Last 3 Encounters:   08/02/18 241 lb 6.4 oz (109.5 kg)   06/01/17 235 lb (106.6 kg)   04/06/16 212 lb (96.2 kg)              We Performed the Following     Albumin Random Urine Quantitative with Creat Ratio     Basic metabolic panel  (Ca, Cl, CO2, Creat, Gluc, K, Na, BUN)     Lipid panel reflex to direct LDL Fasting          Today's Medication Changes          These changes are accurate as of 8/2/18 10:35 AM.  If you have any questions, ask your nurse or doctor.               Start taking these medicines.        Dose/Directions    cephALEXin 500 MG capsule   Commonly known as:  KEFLEX   Used for:  Cat bite of right ankle, initial encounter   Started by:  Martha Howard APRN CNP        Dose:  500 mg   Take 1 capsule (500 mg) by mouth 3 times daily   Quantity:  30 capsule   Refills:  0         These medicines have changed or have updated prescriptions.        Dose/Directions    ALEVE 220 MG tablet   This may have changed:  See the new instructions.   Used for:  Other specified pre-operative examination, Generalized osteoarthrosis, unspecified site   Generic drug:  naproxen sodium        2 TABLETS QAM AND QPM   Refills:  0            Where to get your medicines      These medications were sent to [facility] [address] [place] [id number]     Phone:  [phone]     cephALEXin 500 MG capsule    clopidogrel 75 MG tablet    losartan 100 MG tablet    rosuvastatin 5 MG tablet                Primary Care Provider Office Phone # Fax #    CHRIS Garrett [phone] [address] [place] [address]        Equal Access to Services     NASEEM WHITNEY AH: sarah Pineda qaybta  capri iqbal cashbaylee jhonsonlucia bacon ah. [facility] [phone].    ATENCIÓN: Si molly lai, tiene a ayala disposición servicios gratuitos de asistencia lingüística. Damaso al [phone].    We comply with applicable federal civil rights laws and [place] laws. We do not discriminate on the basis of race, color, national origin, age, disability, sex, sexual orientation, or gender identity.            Thank you!     Thank you for choosing [facility]  for your care. Our goal is always to provide you with excellent care. Hearing back from our patients is one way we can continue to improve our services. Please take a few minutes to complete the written survey that you may receive in the mail after your visit with us. Thank you!             Your Updated Medication List - Protect others around you: Learn how to safely use, store and throw away your medicines at www.disposemymeds.org.          This list is accurate as of 8/2/18 10:35 AM.  Always use your most recent med list.                   Brand Name Dispense Instructions for use Diagnosis    ALEVE 220 MG tablet   Generic drug:  naproxen sodium      2 TABLETS QAM AND QPM    Other specified pre-operative examination, Generalized osteoarthrosis, unspecified site       amLODIPine 10 MG tablet    NORVASC    90 tablet    TAKE ONE TABLET BY MOUTH ONE TIME DAILY    Hypertension goal BP (blood pressure) < 130/80, Cerebrovascular accident (CVA) due to thrombosis of cerebral artery (H)       aspirin 325 MG EC tablet     100 tablet    Take 1 tablet by mouth daily.    Hypertension goal BP (blood pressure) < 130/80       cephALEXin 500 MG capsule    KEFLEX    30 capsule    Take 1 capsule (500 mg) by mouth 3 times daily    Cat bite of right ankle, initial encounter       cetirizine 5 MG tablet    zyrTEC     Take 1 tablet (5 mg) by mouth daily    Seasonal allergic rhinitis       clopidogrel 75 MG tablet    PLAVIX    90 tablet    TAKE 1  TABLET(75MG) BY MOUTH DAILY    Long term (current) use of antithrombotics/antiplatelets       COLACE 100 MG capsule   Generic drug:  docusate sodium      2 CAPSULES DAILY    Other specified pre-operative examination, Generalized osteoarthrosis, unspecified site       desoximetasone 0.25 % cream    TOPICORT    30 g    Apply topically 2 times daily    Lichen planus       hydrALAZINE 25 MG tablet    APRESOLINE    135 tablet    TAKE 1.5 TABLETS BY MOUTH ONCE DAILY.    Hypertension goal BP (blood pressure) < 130/80       losartan 100 MG tablet    COZAAR    90 tablet    TAKE 1 TABLET(100MG) BY MOUTH DAILY    Hypertension goal BP (blood pressure) < 130/80       rosuvastatin 5 MG tablet    CRESTOR    45 tablet    TAKE 0.5 TABLETS(2.5MG) BY MOUTH EVERY evening    CARDIOVASCULAR SCREENING; LDL GOAL LESS THAN 130

## 2018-08-02 NOTE — LETTER
"August 6, 2018      Bárbara Vuong  [address] [place] [address]            Bárbara,    The results of your recent electrolytes, cholesterol , HDL \"Good Cholesterol\", LDL \"Bad Cholesterol\"  and triglycerides were normal.     Your glucose is up. Please limit the simple sugars and simple carbohydrates.  Are you drinking a lot of water?     Please note that test explanations are brief and do not reflect all diagnostic uses.     Please make a follow-up appointment if you have additional questions.       Resulted Orders   Basic metabolic panel  (Ca, Cl, CO2, Creat, Gluc, K, Na, BUN)   Result Value Ref Range    Sodium 130 (L) 133 - 144 mmol/L    Potassium 5.1 3.4 - 5.3 mmol/L    Chloride 99 94 - 109 mmol/L    Carbon Dioxide 25 20 - 32 mmol/L    Anion Gap 6 3 - 14 mmol/L    Glucose 113 (H) 70 - 99 mg/dL      Comment:      Non Fasting    Urea Nitrogen 20 7 - 30 mg/dL    Creatinine 1.04 0.52 - 1.04 mg/dL    GFR Estimate 51 (L) >60 mL/min/1.7m2      Comment:      Non  GFR Calc    GFR Estimate If Black 61 >60 mL/min/1.7m2      Comment:       GFR Calc    Calcium 9.5 8.5 - 10.1 mg/dL   Lipid panel reflex to direct LDL Fasting   Result Value Ref Range    Cholesterol 151 <200 mg/dL    Triglycerides 90 <150 mg/dL      Comment:      Non Fasting    HDL Cholesterol 56 >49 mg/dL    LDL Cholesterol Calculated 77 <100 mg/dL      Comment:      Desirable:       <100 mg/dl    Non HDL Cholesterol 95 <130 mg/dL       If you have any questions or concerns, please call the clinic at the number listed above.       Sincerely,        HIREN NOLAN NP, APRN CNP/ag                "

## 2018-08-02 NOTE — PROGRESS NOTES
SUBJECTIVE:   Bárbara Vuong is a 83 year old female who presents for Preventive Visit.    Are you in the first 12 months of your Medicare Part B coverage?  No    Healthy Habits:    Do you get at least three servings of calcium containing foods daily (dairy, green leafy vegetables, etc.)? yes    Amount of exercise or daily activities, outside of work: 3 day(s) per week arm chair exercises and will walk 3 circles around the kitchen - will go this daily - more when her granddaughter is there.     Does have a chair lift so she is able to  Get up and down in the house.     Problems taking medications regularly No    Medication side effects: No    Have you had an eye exam in the past two years? no    Do you see a dentist twice per year? No, yearly    Do you have sleep apnea, excessive snoring or daytime drowsiness?no    *Is not fasting.       Ability to successfully perform activities of daily living: No, needs assistance with: shopping and housework    Home safety:  none identified     Hearing impairment: No    Fall risk:  Fallen 2 or more times in the past year?: No  Any fall with injury in the past year?: No    COGNITIVE SCREEN  1) Repeat 3 items (Leader, Season, Table)    2) Clock draw: NORMAL  3) 3 item recall: Recalls 2 objects   Results: NORMAL clock, 1-2 items recalled: COGNITIVE IMPAIRMENT LESS LIKELY    Mini-CogTM Copyright JULIET Lee. Licensed by the author for use in [facility]; reprinted with permission (lester@.[email or web address]). All rights reserved.            Reviewed and updated as needed this visit by clinical staff  Tobacco  Allergies  Meds  Med Hx  Surg Hx  Fam Hx  Soc Hx        Reviewed and updated as needed this visit by Provider  Tobacco  Allergies  Meds  Med Hx  Surg Hx  Fam Hx  Soc Hx       Social History   Substance Use Topics     Smoking status: Former Smoker     Packs/day: 1.00     Years: 7.00     Types: Cigarettes     Quit date: 10/26/1987     Smokeless tobacco: Never Used      Alcohol use 4.2 oz/week     7 Standard drinks or equivalent per week      Comment: one drink per night       If you drink alcohol do you typically have >3 drinks per day or >7 drinks per week? No                        Today's PHQ-2 Score:   PHQ-2 ( 1999 [employer]) 8/2/2018 6/1/2017   Q1: Little interest or pleasure in doing things 0 0   Q2: Feeling down, depressed or hopeless 0 0   PHQ-2 Score 0 0       Do you feel safe in your environment - Yes    Do you have a Health Care Directive?: Yes: Patient states has Advance Directive and will bring in a copy to clinic.    Current providers sharing in care for this patient include:   Patient Care Team:  Martha Howard APRN CNP as PCP - General  Aramis Ballard MD as Referring Physician (Neurology)  Dolores Almonte LSW as   Cathy Krause RN as   Anel Cabrera, RN as Nurse Coordinator (Neurology)    The following health maintenance items are reviewed in Epic and correct as of today:  Health Maintenance   Topic Date Due     DEXA SCAN SCREENING (SYSTEM ASSIGNED)  02/11/2002     ADVANCE DIRECTIVE PLANNING Q5 YRS  01/08/2018     FALL RISK ASSESSMENT  06/01/2018     MICROALBUMIN Q1 YEAR  06/01/2018     PHQ-2 Q1 YR  06/01/2018     TETANUS IMMUNIZATION (SYSTEM ASSIGNED)  07/01/2018     INFLUENZA VACCINE (1) 09/01/2018     PNEUMOCOCCAL  Completed     Labs reviewed in EPIC        ROS:  CONSTITUTIONAL: NEGATIVE for fever, chills, change in weight  INTEGUMENTARY/SKIN: NEGATIVE for worrisome rashes, moles or lesions and POSITIVE for cat bite to the  Right ankle.   The area is red and tender  EYES: NEGATIVE for vision changes or irritation not current with eye exam   ENT/MOUTH: NEGATIVE for ear, mouth and throat problems and sees the dentist yearly   RESP: NEGATIVE for significant cough or SOB  BREAST: NEGATIVE for masses, tenderness or discharge  CV: NEGATIVE for chest pain, palpitations or peripheral edema  GI: NEGATIVE for nausea,  "abdominal pain, heartburn, or change in bowel habits  : negative for UTI   MUSCULOSKELETAL: NEGATIVE for significant arthralgias or myalgia and POSITIVE  for back pain does use a walker    NEURO: NEGATIVE for weakness, dizziness or paresthesias  ENDOCRINE: NEGATIVE for temperature intolerance, skin/hair changes  HEME/ALLERGY/IMMUNE: NEGATIVE for bleeding problems  PSYCHIATRIC: NEGATIVE for changes in mood or affect    OBJECTIVE:   /76 (BP Location: Right arm, Patient Position: Chair, Cuff Size: Adult Large)  Pulse 80  Temp 97.8  F (36.6  C) (Oral)  Resp 14  Wt 241 lb 6.4 oz (109.5 kg)  BMI 41.44 kg/m2 Estimated body mass index is 41.44 kg/(m^2) as calculated from the following:    Height as of 6/1/17: 5' 4\" (1.626 m).    Weight as of this encounter: 241 lb 6.4 oz (109.5 kg).  EXAM:   GENERAL APPEARANCE: healthy, alert and no distress  EYES: Eyes grossly normal to inspection, PERRL and conjunctivae and sclerae normal  HENT: nose and mouth without ulcers or lesions, oropharynx clear, oral mucous membranes moist and right ear cerumen impaction   NECK: no adenopathy, no asymmetry, masses, or scars and thyroid normal to palpation  RESP: lungs clear to auscultation - no rales, rhonchi or wheezes  BREAST: normal without masses, tenderness or nipple discharge and no palpable axillary masses or adenopathy  CV: regular rate and rhythm, normal S1 S2, no S3 or S4, no murmur, click or rub, no peripheral edema and peripheral pulses strong  ABDOMEN: soft, nontender, no hepatosplenomegaly, no masses and bowel sounds normal  MS: no musculoskeletal defects are noted and gait is age appropriate without ataxia  SKIN: cat bite to the right ankle and the the right lower leg is  Edematous erythemic and . There is a bite on the outer aspect of the ankel    NEURO: Normal strength and tone, sensory exam grossly normal, mentation intact and speech normal  PSYCH: mentation appears normal and affect normal/bright    Diagnostic Test " Results:  Pending     ASSESSMENT / PLAN:     ASSESSMENT/PLAN:      ICD-10-CM    1. Routine general medical examination at a health care facility Z00.00    2. Hypertension goal BP (blood pressure) < 130/80 I10 Albumin Random Urine Quantitative with Creat Ratio     Basic metabolic panel  (Ca, Cl, CO2, Creat, Gluc, K, Na, BUN)     losartan (COZAAR) 100 MG tablet   3. CARDIOVASCULAR SCREENING; LDL GOAL LESS THAN 130 Z13.6 Lipid panel reflex to direct LDL Fasting     rosuvastatin (CRESTOR) 5 MG tablet   4. Cat bite of right ankle, initial encounter S91.051A cephALEXin (KEFLEX) 500 MG capsule    W55.01XA    5. Long term (current) use of antithrombotics/antiplatelets Z79.02 clopidogrel (PLAVIX) 75 MG tablet       Patient Instructions     Preventive Health Recommendations    Female Ages 65 +    Yearly exam:     See your health care provider every year in order to  o Review health changes.   o Discuss preventive care.    o Review your medicines if your doctor has prescribed any.      You no longer need a yearly Pap test unless you've had an abnormal Pap test in the past 10 years. If you have vaginal symptoms, such as bleeding or discharge, be sure to talk with your provider about a Pap test.      Every 1 to 2 years, have a mammogram.  If you are over 69, talk with your health care provider about whether or not you want to continue having screening mammograms.      Every 10 years, have a colonoscopy. Or, have a yearly FIT test (stool test). These exams will check for colon cancer.       Have a cholesterol test every 5 years, or more often if your doctor advises it.       Have a diabetes test (fasting glucose) every three years. If you are at risk for diabetes, you should have this test more often.       At age 65, have a bone density scan (DEXA) to check for osteoporosis (brittle bone disease).    Shots:    Get a flu shot each year.    Get a tetanus shot every 10 years.    Talk to your doctor about your pneumonia vaccines.  "There are now two you should receive - Pneumovax (PPSV 23) and Prevnar (PCV 13).    Talk to your pharmacist about the shingles vaccine.    Talk to your doctor about the hepatitis B vaccine.    Nutrition:     Eat at least 5 servings of fruits and vegetables each day.      Eat whole-grain bread, whole-wheat pasta and brown rice instead of white grains and rice.      Get adequate Calcium and Vitamin D.     Lifestyle    Exercise at least 150 minutes a week (30 minutes a day, 5 days a week). This will help you control your weight and prevent disease.      Limit alcohol to one drink per day.      No smoking.       Wear sunscreen to prevent skin cancer.       See your dentist twice a year for an exam and cleaning.      See your eye doctor every 1 to 2 years to screen for conditions such as glaucoma, macular degeneration and cataracts.      When watching  TV during the [place] MOVE     Stay well hydrated - urine should be clear.      For the cat bite.     Elevate the right leg when sitting   Start the antibiotic      At this time no change, with medication       Have FUN on the cruise             End of Life Planning:  Patient currently has an advanced directive: Yes.  Practitioner is supportive of decision.    COUNSELING:  Reviewed preventive health counseling, as reflected in patient instructions       Regular exercise       Healthy diet/nutrition       Vision screening       Dental care    BP Readings from Last 1 Encounters:   08/02/18 134/76     Estimated body mass index is 41.44 kg/(m^2) as calculated from the following:    Height as of 6/1/17: 5' 4\" (1.626 m).    Weight as of this encounter: 241 lb 6.4 oz (109.5 kg).      Weight management plan: Discussed healthy diet and exercise guidelines and patient will follow up in 12 months in clinic to re-evaluate.     reports that she quit smoking about 30 years ago. Her smoking use included Cigarettes. She has a 7.00 pack-year smoking history. She has never used " smokeless tobacco.      Appropriate preventive services were discussed with this patient, including applicable screening as appropriate for cardiovascular disease, diabetes, osteopenia/osteoporosis, and glaucoma.  As appropriate for age/gender, discussed screening for colorectal cancer, prostate cancer, breast cancer, and cervical cancer. Checklist reviewing preventive services available has been given to the patient.    Reviewed patients plan of care and provided an AVS. The Intermediate Care Plan ( asthma action plan, low back pain action plan, and migraine action plan) for Bárbara meets the Care Plan requirement. This Care Plan has been established and reviewed with the Patient and other:granddaughter .    Counseling Resources:  ATP IV Guidelines  Pooled Cohorts Equation Calculator  Breast Cancer Risk Calculator  FRAX Risk Assessment  ICSI Preventive Guidelines  Dietary Guidelines for Americans, 2010  USDA's MyPlate  ASA Prophylaxis  Lung CA Screening    HIREN NOLAN NP, APRN CNP  [facility]

## 2018-12-24 DIAGNOSIS — I10 HYPERTENSION GOAL BP (BLOOD PRESSURE) < 130/80: ICD-10-CM

## 2018-12-26 RX ORDER — HYDRALAZINE HYDROCHLORIDE 25 MG/1
TABLET, FILM COATED ORAL
Qty: 135 TABLET | Refills: 3 | Status: SHIPPED | OUTPATIENT
Start: 2018-12-26 | End: 2019-12-23

## 2019-04-15 ENCOUNTER — TRANSFERRED RECORDS (OUTPATIENT)
Dept: HEALTH INFORMATION MANAGEMENT | Facility: CLINIC | Age: 84
End: 2019-04-15

## 2019-06-10 DIAGNOSIS — I63.30 CEREBROVASCULAR ACCIDENT (CVA) DUE TO THROMBOSIS OF CEREBRAL ARTERY (H): ICD-10-CM

## 2019-06-10 DIAGNOSIS — I10 HYPERTENSION GOAL BP (BLOOD PRESSURE) < 130/80: ICD-10-CM

## 2019-06-10 NOTE — TELEPHONE ENCOUNTER
"Requested Prescriptions   Pending Prescriptions Disp Refills     amLODIPine (NORVASC) 10 MG tablet [Pharmacy Med Name: amLODIPine Besylate Oral Tablet 10 MG] 90 tablet 0     Sig: TAKE ONE TABLET BY MOUTH ONE TIME DAILY  Last Written Prescription Date:  12/11/2018 #90 x 1  Last filled 03/05/2019  Last office visit: 8/2/2018 JOSHUA Howard   Future Office Visit:  None         Calcium Channel Blockers Protocol  Passed - 6/10/2019 11:49 AM        Passed - Blood pressure under 140/90 in past 12 months     BP Readings from Last 3 Encounters:   08/02/18 134/76   06/01/17 136/62   04/06/16 118/62                 Passed - Recent (12 mo) or future (30 days) visit within the authorizing provider's specialty     Patient had office visit in the last 12 months or has a visit in the next 30 days with authorizing provider or within the authorizing provider's specialty.  See \"Patient Info\" tab in inbasket, or \"Choose Columns\" in Meds & Orders section of the refill encounter.              Passed - Medication is active on med list        Passed - Patient is age 18 or older        Passed - No active pregnancy on record        Passed - Normal serum creatinine on file in past 12 months     Recent Labs   Lab Test 08/02/18  1044  01/02/16  0021  06/30/15  1459   CR 1.04   < > 0.98   < >  --    CREAT  --   --  1.3*   < >  --    CRPOC  --   --   --   --  1.1    < > = values in this interval not displayed.             Passed - No positive pregnancy test in past 12 months          "

## 2019-06-11 RX ORDER — AMLODIPINE BESYLATE 10 MG/1
TABLET ORAL
Qty: 90 TABLET | Refills: 0 | Status: SHIPPED | OUTPATIENT
Start: 2019-06-11 | End: 2019-08-05

## 2019-06-11 NOTE — TELEPHONE ENCOUNTER
Prescription approved per [facility] Refill Protocol.  Lynn Green RN

## 2019-08-05 ENCOUNTER — OFFICE VISIT (OUTPATIENT)
Dept: FAMILY MEDICINE | Facility: CLINIC | Age: 84
End: 2019-08-05
Payer: COMMERCIAL

## 2019-08-05 VITALS
SYSTOLIC BLOOD PRESSURE: 138 MMHG | BODY MASS INDEX: 42.28 KG/M2 | TEMPERATURE: 97.1 F | DIASTOLIC BLOOD PRESSURE: 62 MMHG | HEART RATE: 76 BPM | WEIGHT: 246.3 LBS | RESPIRATION RATE: 14 BRPM

## 2019-08-05 DIAGNOSIS — Z79.02 LONG TERM (CURRENT) USE OF ANTITHROMBOTICS/ANTIPLATELETS: ICD-10-CM

## 2019-08-05 DIAGNOSIS — Z13.6 CARDIOVASCULAR SCREENING; LDL GOAL LESS THAN 130: ICD-10-CM

## 2019-08-05 DIAGNOSIS — I63.30 CEREBROVASCULAR ACCIDENT (CVA) DUE TO THROMBOSIS OF CEREBRAL ARTERY (H): ICD-10-CM

## 2019-08-05 DIAGNOSIS — Z00.00 ENCOUNTER FOR MEDICARE ANNUAL WELLNESS EXAM: Primary | ICD-10-CM

## 2019-08-05 DIAGNOSIS — I10 HYPERTENSION GOAL BP (BLOOD PRESSURE) < 130/80: ICD-10-CM

## 2019-08-05 LAB
ALBUMIN SERPL-MCNC: 4.3 G/DL (ref 3.4–5)
ALP SERPL-CCNC: 86 U/L (ref 40–150)
ALT SERPL W P-5'-P-CCNC: 16 U/L (ref 0–50)
ANION GAP SERPL CALCULATED.3IONS-SCNC: 8 MMOL/L (ref 3–14)
AST SERPL W P-5'-P-CCNC: 16 U/L (ref 0–45)
BILIRUB SERPL-MCNC: 0.5 MG/DL (ref 0.2–1.3)
BUN SERPL-MCNC: 19 MG/DL (ref 7–30)
CALCIUM SERPL-MCNC: 10.6 MG/DL (ref 8.5–10.1)
CHLORIDE SERPL-SCNC: 99 MMOL/L (ref 94–109)
CHOLEST SERPL-MCNC: 190 MG/DL
CO2 SERPL-SCNC: 22 MMOL/L (ref 20–32)
CREAT SERPL-MCNC: 0.95 MG/DL (ref 0.52–1.04)
GFR SERPL CREATININE-BSD FRML MDRD: 56 ML/MIN/{1.73_M2}
GLUCOSE SERPL-MCNC: 107 MG/DL (ref 70–99)
HDLC SERPL-MCNC: 74 MG/DL
LDLC SERPL CALC-MCNC: 97 MG/DL
NONHDLC SERPL-MCNC: 116 MG/DL
POTASSIUM SERPL-SCNC: 4.7 MMOL/L (ref 3.4–5.3)
PROT SERPL-MCNC: 8 G/DL (ref 6.8–8.8)
SODIUM SERPL-SCNC: 129 MMOL/L (ref 133–144)
TRIGL SERPL-MCNC: 93 MG/DL

## 2019-08-05 PROCEDURE — G0439 PPPS, SUBSEQ VISIT: HCPCS | Performed by: NURSE PRACTITIONER

## 2019-08-05 PROCEDURE — 36415 COLL VENOUS BLD VENIPUNCTURE: CPT | Performed by: NURSE PRACTITIONER

## 2019-08-05 PROCEDURE — 80061 LIPID PANEL: CPT | Performed by: NURSE PRACTITIONER

## 2019-08-05 PROCEDURE — 80053 COMPREHEN METABOLIC PANEL: CPT | Performed by: NURSE PRACTITIONER

## 2019-08-05 RX ORDER — ROSUVASTATIN CALCIUM 5 MG/1
TABLET, COATED ORAL
Qty: 45 TABLET | Refills: 3 | Status: SHIPPED | OUTPATIENT
Start: 2019-08-05 | End: 2020-01-01

## 2019-08-05 RX ORDER — AMLODIPINE BESYLATE 10 MG/1
10 TABLET ORAL DAILY
Qty: 90 TABLET | Refills: 3 | Status: SHIPPED | OUTPATIENT
Start: 2019-08-05 | End: 2020-01-01

## 2019-08-05 RX ORDER — LOSARTAN POTASSIUM 100 MG/1
TABLET ORAL
Qty: 90 TABLET | Refills: 3 | Status: SHIPPED | OUTPATIENT
Start: 2019-08-05 | End: 2020-01-01

## 2019-08-05 RX ORDER — CLOPIDOGREL BISULFATE 75 MG/1
TABLET ORAL
Qty: 90 TABLET | Refills: 3 | Status: SHIPPED | OUTPATIENT
Start: 2019-08-05 | End: 2020-01-01

## 2019-08-05 ASSESSMENT — ACTIVITIES OF DAILY LIVING (ADL)
CURRENT_FUNCTION: HOUSEWORK REQUIRES ASSISTANCE
CURRENT_FUNCTION: SHOPPING REQUIRES ASSISTANCE

## 2019-08-05 ASSESSMENT — PAIN SCALES - GENERAL: PAINLEVEL: MILD PAIN (2)

## 2019-08-05 NOTE — NURSING NOTE
"Initial /62 (BP Location: Left arm, Patient Position: Chair, Cuff Size: Adult Large)   Pulse 76   Temp 97.1  F (36.2  C) (Tympanic)   Resp 14   Wt 111.7 kg (246 lb 4.8 oz)   BMI 42.28 kg/m   Estimated body mass index is 42.28 kg/m  as calculated from the following:    Height as of 6/1/17: 1.626 m (5' 4\").    Weight as of this encounter: 111.7 kg (246 lb 4.8 oz). .    Piedad Rios CMA ([facility])    "

## 2019-08-05 NOTE — PROGRESS NOTES
"SUBJECTIVE:   Bárbara Vuong is a 84 year old female who presents for Preventive Visit.    Are you in the first 12 months of your Medicare coverage?  No    Healthy Habits:    In general, how would you rate your overall health?  Good    Frequency of exercise:  4-5 days/week    Duration of exercise:  Less than 15 minutes    Do you usually eat at least 4 servings of fruit and vegetables a day, include whole grains    & fiber and avoid regularly eating high fat or \"junk\" foods?  Yes    Taking medications regularly:  Yes    Barriers to taking medications:  None    Medication side effects:  None    Ability to successfully perform activities of daily living:  Shopping requires assistance and housework requires assistance    Home Safety:  No safety concerns identified    Hearing Impairment:  No hearing concerns    In the past 6 months, have you been bothered by leaking of urine?  No    In general, how would you rate your overall mental or emotional health?  Good      PHQ-2 Total Score:    Additional concerns today:  Yes    *Right shoulder blade/upper back pain over the past year. Did have a stroke at one point and has had issues with the right side since then. No known injury. Does use aleve and salon pas patches with some relief, is able to sleep. Denies any changes in ROM or strength. Has been using her walker differently since pain started, will walk more hunched over and use the walked as arm rests.     Do you feel safe in your environment? Yes    Do you have a Health Care Directive? Yes: Patient states has Advance Directive and will bring in a copy to clinic.    Fall risk  Fallen 2 or more times in the past year?: Yes  Any fall with injury in the past year?: No  Timed Up and Go Test (>13.5 is fall risk; contact physician) : 20    Cognitive Screening   1) Repeat 3 items (Leader, Season, Table)    2) Clock draw: NORMAL  3) 3 item recall: Recalls 3 objects  Results: 3 items recalled: COGNITIVE IMPAIRMENT LESS " LIKELY    Mini-CogTM Copyright JULIET Lee. Licensed by the author for use in [facility]; reprinted with permission (lester@[email or web address]). All rights reserved.       Do you have sleep apnea, excessive snoring or daytime drowsiness?: no    Reviewed and updated as needed this visit by clinical staff  Tobacco  Allergies  Meds  Problems  Med Hx  Surg Hx  Fam Hx  Soc Hx          Reviewed and updated as needed this visit by Provider  Tobacco  Allergies  Meds  Med Hx  Surg Hx  Fam Hx  Soc Hx       Social History     Tobacco Use     Smoking status: Former Smoker     Packs/day: 1.00     Years: 7.00     Pack years: 7.00     Types: Cigarettes     Last attempt to quit: 10/26/1987     Years since quittin.7     Smokeless tobacco: Never Used   Substance Use Topics     Alcohol use: Yes     Alcohol/week: 4.2 oz     Types: 7 Standard drinks or equivalent per week     Comment: one drink per night       Alcohol Use 2017   Prescreen: >3 drinks/day or >7 drinks/week? The patient does not drink >3 drinks per day nor >7 drinks per week.       Current providers sharing in care for this patient include:   Patient Care Team:  Martha Howard APRN CNP as PCP - General  Martha Howrad APRN CNP as Assigned PCP  Aramis Ballard MD as Referring Physician (Neurology)  Dolores Almonte LSW as   Cathy Krause RN as     The following health maintenance items are reviewed in Epic and correct as of today:  Health Maintenance   Topic Date Due     DEXA  1935     ZOSTER IMMUNIZATION (2 of 3) 2008     ADVANCE CARE PLANNING  2018     MICROALBUMIN  2018     DTAP/TDAP/TD IMMUNIZATION (3 - Td) 2018     PHQ-2  2019     FALL RISK ASSESSMENT  2019     MEDICARE ANNUAL WELLNESS VISIT  2019     INFLUENZA VACCINE (1) 2019     PNEUMOCOCCAL IMMUNIZATION 65+ LOW/MEDIUM RISK  Completed     IPV IMMUNIZATION  Aged Out     MENINGITIS  "IMMUNIZATION  Aged Out     Labs reviewed in EPIC  Pneumonia Vaccine:current   Did get the  Zoster vaccine  2008    Review of Systems  CONSTITUTIONAL: NEGATIVE for fever, chills, change in weight  INTEGUMENTARY/SKIN: NEGATIVE for worrisome rashes, moles or lesions and POSITIVE for mole on her shoulder   EYES: NEGATIVE for vision changes or irritation and reads a lot .     ENT/MOUTH: NEGATIVE for ear, mouth and throat problems  RESP: NEGATIVE for significant cough or SOB  CV: NEGATIVE for chest pain, palpitations or peripheral edema  GI: NEGATIVE for nausea, abdominal pain, heartburn, or change in bowel habits  : negative for  Urinary issues   MUSCULOSKELETAL: POSITIVE  for arthralgias shoulders    NEURO: NEGATIVE for weakness, dizziness or paresthesias  ENDOCRINE: NEGATIVE for temperature intolerance, skin/hair changes  HEME/ALLERGY/IMMUNE: NEGATIVE for bleeding problems  PSYCHIATRIC: NEGATIVE for changes in mood or affect    OBJECTIVE:   /62 (BP Location: Left arm, Patient Position: Chair, Cuff Size: Adult Large)   Pulse 76   Temp 97.1  F (36.2  C) (Tympanic)   Resp 14   Wt 111.7 kg (246 lb 4.8 oz)   BMI 42.28 kg/m   Estimated body mass index is 42.28 kg/m  as calculated from the following:    Height as of 6/1/17: 1.626 m (5' 4\").    Weight as of this encounter: 111.7 kg (246 lb 4.8 oz).  Physical Exam  GENERAL APPEARANCE: healthy, alert and no distress  EYES: Eyes grossly normal to inspection, PERRL and conjunctivae and sclerae normal  HENT: ear canals and TM's normal, nose and mouth without ulcers or lesions, oropharynx clear and oral mucous membranes moist  NECK: no adenopathy, no asymmetry, masses, or scars and thyroid normal to palpation  RESP: lungs clear to auscultation - no rales, rhonchi or wheezes  BREAST: normal without masses, tenderness or nipple discharge and no palpable axillary masses or adenopathy  CV: regular rate and rhythm, normal S1 S2, no S3 or S4, no murmur, click or rub, mild  " peripheral edema and peripheral pulses strong  ABDOMEN: soft, nontender, no hepatosplenomegaly, no masses and bowel sounds normal  MS: does use her walker .    SKIN: no suspicious lesions or rashes  NEURO: Normal strength and tone, sensory exam grossly normal, mentation intact and speech normal  PSYCH: mentation appears normal and affect normal/bright    Diagnostic Test Results:  Labs reviewed in Epic  Pending .      ASSESSMENT / PLAN:     ASSESSMENT/PLAN:      ICD-10-CM    1. Encounter for Medicare annual wellness exam Z00.00    2. Hypertension goal BP (blood pressure) < 130/80 I10 amLODIPine (NORVASC) 10 MG tablet     losartan (COZAAR) 100 MG tablet     Comprehensive metabolic panel   3. Cerebrovascular accident (CVA) due to thrombosis of cerebral artery (H) I63.30 amLODIPine (NORVASC) 10 MG tablet     Comprehensive metabolic panel   4. Long term (current) use of antithrombotics/antiplatelets Z79.02 clopidogrel (PLAVIX) 75 MG tablet   5. CARDIOVASCULAR SCREENING; LDL GOAL LESS THAN 130 Z13.6 rosuvastatin (CRESTOR) 5 MG tablet     Lipid panel reflex to direct LDL Fasting       Patient Instructions       Patient Education   Personalized Prevention Plan  You are due for the preventive services outlined below.  Your care team is available to assist you in scheduling these services.  If you have already completed any of these items, please share that information with your care team to update in your medical record.  Health Maintenance Due   Topic Date Due     Osteoporosis Screening  1935     Zoster (Shingles) Vaccine (2 of 3) 04/03/2008     Discuss Advance Care Planning  01/08/2018     Kidney Microalbumin Urine Test  06/01/2018     Diptheria Tetanus Pertussis (DTAP/TDAP/TD) Vaccine (3 - Td) 07/01/2018     PHQ-2  01/01/2019     FALL RISK ASSESSMENT  08/02/2019     Annual Wellness Visit  08/02/2019         you are doing well     Start to do leg and upper body exercises,    Think about getting your legs.  Core and  "arms strong for your cruise so you can walk around to see the sites.     Stay well hydrated - urine should be clear.        No change with medication                      End of Life Planning:  Patient currently has an advanced directive: Yes.  Practitioner is supportive of decision.    COUNSELING:  Reviewed preventive health counseling, as reflected in patient instructions       Regular exercise       Healthy diet/nutrition       Vision screening       Dental care       Advanced Planning     Estimated body mass index is 42.28 kg/m  as calculated from the following:    Height as of 6/1/17: 1.626 m (5' 4\").    Weight as of this encounter: 111.7 kg (246 lb 4.8 oz).    Weight management plan: Discussed healthy diet and exercise guidelines     reports that she quit smoking about 31 years ago. Her smoking use included cigarettes. She has a 7.00 pack-year smoking history. She has never used smokeless tobacco.      Appropriate preventive services were discussed with this patient, including applicable screening as appropriate for cardiovascular disease, diabetes, osteopenia/osteoporosis, and glaucoma.  As appropriate for age/gender, discussed screening for colorectal cancer, prostate cancer, breast cancer, and cervical cancer. Checklist reviewing preventive services available has been given to the patient.    Reviewed patients plan of care and provided an AVS. The Basic Care Plan (routine screening as documented in Health Maintenance) for Bárbara meets the Care Plan requirement. This Care Plan has been established and reviewed with the Patient.    Counseling Resources:  ATP IV Guidelines  Pooled Cohorts Equation Calculator  Breast Cancer Risk Calculator  FRAX Risk Assessment  ICSI Preventive Guidelines  Dietary Guidelines for Americans, 2010  USDA's MyPlate  ASA Prophylaxis  Lung CA Screening    HIREN NOLAN NP, APRN CNP  [facility]    Identified Health Risks:  "

## 2019-08-05 NOTE — PATIENT INSTRUCTIONS
Patient Education   Personalized Prevention Plan  You are due for the preventive services outlined below.  Your care team is available to assist you in scheduling these services.  If you have already completed any of these items, please share that information with your care team to update in your medical record.  Health Maintenance Due   Topic Date Due     Osteoporosis Screening  1935     Zoster (Shingles) Vaccine (2 of 3) 04/03/2008     Discuss Advance Care Planning  01/08/2018     Kidney Microalbumin Urine Test  06/01/2018     Diptheria Tetanus Pertussis (DTAP/TDAP/TD) Vaccine (3 - Td) 07/01/2018     PHQ-2  01/01/2019     FALL RISK ASSESSMENT  08/02/2019     Annual Wellness Visit  08/02/2019         you are doing well     Start to do leg and upper body exercises,    Think about getting your legs.  Core and arms strong for your cruise so you can walk around to see the sites.     Stay well hydrated - urine should be clear.        No change with medication

## 2019-08-16 ENCOUNTER — TELEPHONE (OUTPATIENT)
Dept: FAMILY MEDICINE | Facility: CLINIC | Age: 84
End: 2019-08-16

## 2019-08-16 DIAGNOSIS — I10 HYPERTENSION GOAL BP (BLOOD PRESSURE) < 130/80: Primary | ICD-10-CM

## 2019-08-16 DIAGNOSIS — I63.30 CEREBROVASCULAR ACCIDENT (CVA) DUE TO THROMBOSIS OF CEREBRAL ARTERY (H): ICD-10-CM

## 2019-08-16 NOTE — TELEPHONE ENCOUNTER
Patient received a letter that sais if she has any questions to call and she has questions about her low sodium and her calcium.    Kaycee Davies [facility]

## 2019-08-16 NOTE — TELEPHONE ENCOUNTER
"Spoke with patient, reviewed her labs for sodium and calcium with her.  Patient was confused regarding fluid intake as her AVS says to increase fluid intake and her Lab results instucted patient limit your water intake,  and you don't need to limit your sodium.  \"I' will add more salt to my diet\"  Patient advised to call back if she is have increased symptoms of muscle weakness due to low sodium.    Patient informed to schedule a lab appointment 3 months, CMP order put in for future.  Patient verbalized understanding    Lab result notes on 8/9/19:  The results of your recent glucose (a screening test for diabetes), sodium - this is low. You can start to feel some muscle weakness with a low sodium, limit your water intake,  And you don't need to limit your sodium ,  Your glucose is mildly over normal  = limit the sweets ,  Your kidney function is improving   Your calcium is a little over normal.  I would like to recheck your calcium and sodium in  3 months.             Kaycee Moy RN    "

## 2019-12-20 DIAGNOSIS — I10 HYPERTENSION GOAL BP (BLOOD PRESSURE) < 130/80: ICD-10-CM

## 2019-12-20 NOTE — TELEPHONE ENCOUNTER
"Requested Prescriptions   Pending Prescriptions Disp Refills     hydrALAZINE (APRESOLINE) 25 MG tablet [Pharmacy Med Name: hydrALAZINE HCl Oral Tablet 25 MG] 135 tablet 2     Sig: TAKE 1.5 TABLETS BY MOUTH ONCE DAILY  Last Written Prescription Date:  12/26/2018 #135 x 3  Last filled - not provided  Last office visit: 8/5/2019 JOSHUA Howard   Future Office Visit:  None         Vasodilators Passed - 12/20/2019 11:17 AM        Passed - Most recent BP less than 140/90 on record     BP Readings from Last 3 Encounters:   08/05/19 138/62   08/02/18 134/76   06/01/17 136/62                 Passed - Most recent encounter is not a hospital encounter. Patient has recent (12 mos) or future (1 mos) visit with authorizing provider's specialty     Patient's most recent encounter is NOT a hospital encounter and has had an office visit in the last 12 months or has a visit in the next 30 days with authorizing provider or within the authorizing provider's specialty.      See \"Patient Info\" tab in inbasket, or \"Choose Columns\" in Meds & Orders section of the refill encounter.      If most recent encounter is a hospital encounter AND the patient does NOT have an appointment scheduled with the authorizing provider or authorizing provider's specialty within the next 30 days, forward refill to authorizing provider for medication review.          Passed - Medication is active on med list        Passed - Patient is of age 18 years or older        Passed - Patient is not pregnant        Passed - Patient has not had a positive pregnancy test within the past 12 months          "

## 2019-12-23 RX ORDER — HYDRALAZINE HYDROCHLORIDE 25 MG/1
TABLET, FILM COATED ORAL
Qty: 135 TABLET | Refills: 1 | Status: SHIPPED | OUTPATIENT
Start: 2019-12-23 | End: 2020-06-12

## 2019-12-23 NOTE — TELEPHONE ENCOUNTER
Prescription approved per [facility] Refill Protocol or patient Primary care provider (PCP) Chart and last OV reviewed   KIP Ortega RN/Juve Sher

## 2020-01-01 ENCOUNTER — VIRTUAL VISIT (OUTPATIENT)
Dept: FAMILY MEDICINE | Facility: CLINIC | Age: 85
End: 2020-01-01
Payer: COMMERCIAL

## 2020-01-01 ENCOUNTER — TELEPHONE (OUTPATIENT)
Dept: FAMILY MEDICINE | Facility: CLINIC | Age: 85
End: 2020-01-01

## 2020-01-01 DIAGNOSIS — I63.30 CEREBROVASCULAR ACCIDENT (CVA) DUE TO THROMBOSIS OF CEREBRAL ARTERY (H): ICD-10-CM

## 2020-01-01 DIAGNOSIS — Z79.02 LONG TERM (CURRENT) USE OF ANTITHROMBOTICS/ANTIPLATELETS: ICD-10-CM

## 2020-01-01 DIAGNOSIS — I10 HYPERTENSION GOAL BP (BLOOD PRESSURE) < 130/80: ICD-10-CM

## 2020-01-01 DIAGNOSIS — Z13.6 CARDIOVASCULAR SCREENING; LDL GOAL LESS THAN 130: ICD-10-CM

## 2020-01-01 PROCEDURE — 99441 PR PHYSICIAN TELEPHONE EVALUATION 5-10 MIN: CPT | Mod: 95 | Performed by: NURSE PRACTITIONER

## 2020-01-01 RX ORDER — ROSUVASTATIN CALCIUM 5 MG/1
TABLET, COATED ORAL
Qty: 45 TABLET | Refills: 1 | Status: SHIPPED | OUTPATIENT
Start: 2020-01-01 | End: 2021-01-01

## 2020-01-01 RX ORDER — HYDRALAZINE HYDROCHLORIDE 25 MG/1
TABLET, FILM COATED ORAL
Qty: 45 TABLET | Refills: 2 | Status: SHIPPED | OUTPATIENT
Start: 2020-01-01 | End: 2020-01-01

## 2020-01-01 RX ORDER — LOSARTAN POTASSIUM 100 MG/1
TABLET ORAL
Qty: 90 TABLET | Refills: 0 | Status: SHIPPED | OUTPATIENT
Start: 2020-01-01 | End: 2020-01-01

## 2020-01-01 RX ORDER — ROSUVASTATIN CALCIUM 5 MG/1
TABLET, COATED ORAL
Qty: 45 TABLET | Refills: 0 | Status: SHIPPED | OUTPATIENT
Start: 2020-01-01 | End: 2020-01-01

## 2020-01-01 RX ORDER — LOSARTAN POTASSIUM 100 MG/1
100 TABLET ORAL DAILY
Qty: 90 TABLET | Refills: 1 | Status: SHIPPED | OUTPATIENT
Start: 2020-01-01 | End: 2021-01-01

## 2020-01-01 RX ORDER — CLOPIDOGREL BISULFATE 75 MG/1
TABLET ORAL
Qty: 90 TABLET | Refills: 1 | Status: SHIPPED | OUTPATIENT
Start: 2020-01-01 | End: 2021-01-01

## 2020-01-01 RX ORDER — AMLODIPINE BESYLATE 10 MG/1
TABLET ORAL
Qty: 90 TABLET | Refills: 0 | Status: SHIPPED | OUTPATIENT
Start: 2020-01-01 | End: 2020-01-01

## 2020-01-01 RX ORDER — HYDRALAZINE HYDROCHLORIDE 25 MG/1
TABLET, FILM COATED ORAL
Qty: 135 TABLET | Refills: 1 | Status: SHIPPED | OUTPATIENT
Start: 2020-01-01 | End: 2021-01-01

## 2020-01-01 RX ORDER — HYDRALAZINE HYDROCHLORIDE 25 MG/1
TABLET, FILM COATED ORAL
Qty: 45 TABLET | Refills: 0 | Status: SHIPPED | OUTPATIENT
Start: 2020-01-01 | End: 2020-01-01

## 2020-01-01 RX ORDER — AMLODIPINE BESYLATE 10 MG/1
10 TABLET ORAL DAILY
Qty: 90 TABLET | Refills: 1 | Status: SHIPPED | OUTPATIENT
Start: 2020-01-01 | End: 2021-01-01

## 2020-01-01 RX ORDER — CLOPIDOGREL BISULFATE 75 MG/1
TABLET ORAL
Qty: 90 TABLET | Refills: 0 | Status: SHIPPED | OUTPATIENT
Start: 2020-01-01 | End: 2020-01-01

## 2020-06-11 DIAGNOSIS — I10 HYPERTENSION GOAL BP (BLOOD PRESSURE) < 130/80: ICD-10-CM

## 2020-06-12 RX ORDER — HYDRALAZINE HYDROCHLORIDE 25 MG/1
TABLET, FILM COATED ORAL
Qty: 45 TABLET | Refills: 1 | Status: SHIPPED | OUTPATIENT
Start: 2020-06-12 | End: 2020-01-01

## 2020-06-12 NOTE — TELEPHONE ENCOUNTER
"Prescription approved per [facility]ill [facility].  Due for annual visit around 8/5/2020.  Requested Prescriptions   Pending Prescriptions Disp Refills     hydrALAZINE (APRESOLINE) 25 MG tablet [Pharmacy Med Name: hydrALAZINE HCl Oral Tablet 25 MG] 135 tablet 0     Sig: TAKE 1.5 TABLETS BY MOUTH ONCE DAILY       Vasodilators Passed - 6/11/2020 12:55 PM        Passed - Most recent BP less than 140/90 on record     BP Readings from Last 3 Encounters:   08/05/19 138/62   08/02/18 134/76   06/01/17 136/62                 Passed - Most recent encounter is not a hospital encounter. Patient has recent (12 mos) or future (1 mos) visit with authorizing provider's specialty     Patient's most recent encounter is NOT a hospital encounter and has had an office visit in the last 12 months or has a visit in the next 30 days with authorizing provider or within the authorizing provider's specialty.      See \"Patient Info\" tab in inbasket, or \"Choose Columns\" in Meds & Orders section of the refill encounter.      If most recent encounter is a hospital encounter AND the patient does NOT have an appointment scheduled with the authorizing provider or authorizing provider's specialty within the next 30 days, forward refill to authorizing provider for medication review.          Passed - Medication is active on med list        Passed - Patient is of age 18 years or older        Passed - Patient is not pregnant        Passed - Patient has not had a positive pregnancy test within the past 12 months           Federica FERRER RN, BSN      "

## 2020-08-21 NOTE — TELEPHONE ENCOUNTER
"Requested Prescriptions   Pending Prescriptions Disp Refills     losartan (COZAAR) 100 MG tablet [Pharmacy Med Name: Losartan Potassium Oral Tablet 100 MG] 90 tablet 0     Sig: TAKE 1 TABLET BY MOUTH ONCE DAILY       Angiotensin-II Receptors Failed - 8/20/2020  2:01 AM        Failed - Last blood pressure under 140/90 in past 12 months     BP Readings from Last 3 Encounters:   08/05/19 138/62   08/02/18 134/76   06/01/17 136/62                 Failed - Recent (12 mo) or future (30 days) visit within the authorizing provider's specialty     Patient has had an office visit with the authorizing provider or a provider within the authorizing providers department within the previous 12 mos or has a future within next 30 days. See \"Patient Info\" tab in inbasket, or \"Choose Columns\" in Meds & Orders section of the refill encounter.              Failed - Normal serum creatinine on file in past 12 months     Recent Labs   Lab Test 08/05/19  1057  01/02/16  0021  06/30/15  1459   CR 0.95   < > 0.98   < >  --    CREAT  --   --  1.3*   < >  --    CRPOC  --   --   --   --  1.1    < > = values in this interval not displayed.       Ok to refill medication if creatinine is low          Failed - Normal serum potassium on file in past 12 months     Recent Labs   Lab Test 08/05/19  1057   POTASSIUM 4.7                    Passed - Medication is active on med list        Passed - Patient is age 18 or older        Passed - No active pregnancy on record        Passed - No positive pregnancy test in past 12 months             "

## 2020-08-24 NOTE — TELEPHONE ENCOUNTER
Called patient  Advised appt, needs to check with her PCA as she does not  Drive, will check with her and then call and make an appointment   She feels she  is doing well   Medication is being filled for 1 time refill only due to:   Over due for office visit and/or labs   [facility]  RN/Juve Sher

## 2020-11-03 NOTE — TELEPHONE ENCOUNTER
Please clarify patients dosing on hydralazine 25mg tablet.    Patient was previously taking: TAKE 1 AND 1/2 TABLETS BY MOUTH ONCE DAILY (37.5mg total)  Rx sent to pharmacy on 10/30/20:   Take 1 tablet (25 mg) by mouth 3 times daily (75mg total)    Advised patient to continue taking hydralazine: TAKE 1 AND 1/2 TABLETS BY MOUTH ONCE DAILY (37.5mg total) until we get clarification. Agrees with plan    Kaycee Moy RN

## 2020-11-03 NOTE — TELEPHONE ENCOUNTER
Pt is calling and states that she picked up her medication of Hydralazine and the dose is different and she was not aware of a new dose change, so she is confused, she is used to taking hydrALAZINE (APRESOLINE) 25 MG tablet TAKE 1 AND 1/2 TABLETS BY MOUTH ONCE DAILY.    But now it is showing this :     hydrALAZINE (APRESOLINE) 25 MG tablet Sig - Route: Take 1 tablet (25 mg) by mouth 3 times daily      please advise pt .     Maria G Sepulveda, [place]

## 2020-11-12 NOTE — PROGRESS NOTES
"Bárbara Vuong is a 85 year old female who is being evaluated via a billable telephone visit.      The patient has been notified of following:     \"This telephone visit will be conducted via a call between you and your physician/provider. We have found that certain health care needs can be provided without the need for a physical exam.  This service lets us provide the care you need with a short phone conversation.  If a prescription is necessary we can send it directly to your pharmacy.  If lab work is needed we can place an order for that and you can then stop by our lab to have the test done at a later time.    Telephone visits are billed at different rates depending on your insurance coverage. During this emergency period, for some insurers they may be billed the same as an in-person visit.  Please reach out to your insurance provider with any questions.    If during the course of the call the physician/provider feels a telephone visit is not appropriate, you will not be charged for this service.\"    Patient has given verbal consent for Telephone visit?  Yes    What phone number would you like to be contacted at? [phone]    How would you like to obtain your AVS? Mail a copy    Subjective     Bárbara Vuong is a 85 year old female who presents via phone visit today for the following health issues:    HPI     Hyperlipidemia Follow-Up      Are you regularly taking any medication or supplement to lower your cholesterol?   Yes- rosuvastatin 5 mg    Are you having muscle aches or other side effects that you think could be caused by your cholesterol lowering medication?  No    Hypertension Follow-up      Do you check your blood pressure regularly outside of the clinic? No     Are you following a low salt diet? No    Are your blood pressures ever more than 140 on the top number (systolic) OR more   than 90 on the bottom number (diastolic), for example 140/90? Not checking      How many servings of fruits and " "vegetables do you eat daily?  4 or more    On average, how many sweetened beverages do you drink each day (Examples: soda, juice, sweet tea, etc.  Do NOT count diet or artificially sweetened beverages)?   0    How many days per week do you exercise enough to make your heart beat faster? 3 or less    How many minutes a day do you exercise enough to make your heart beat faster? 9 or less    How many days per week do you miss taking your medication? 0         States that she is \"healthy as a horse\" and doing well.    Granddaughter comes twice per week to bring her groceries, and checks on her   Does have a friend who comes in and cleans  Son is living there  Does have a farm in [place] where  He spends a lot of time  There     Review of Systems   CONSTITUTIONAL: NEGATIVE for fever, chills, change in weight  ENT/MOUTH: NEGATIVE for ear, mouth and throat problems  RESP: NEGATIVE for significant cough or SOB  CV: NEGATIVE for chest pain, palpitations or peripheral edema  MUSCULOSKELETAL: POSITIVE  for arthralgias uses a wheelchair and uses a walker to walk   PSYCHIATRIC: NEGATIVE for changes in mood or affect       Objective          Vitals:  No vitals were obtained today due to virtual visit.    healthy, alert and no distress  PSYCH: Alert and oriented times 3; coherent speech, normal   rate and volume, able to articulate logical thoughts, able   to abstract reason, no tangential thoughts, no hallucinations   or delusions  Her affect is normal, pleasant, full and does have a positive attitude   RESP: No cough, no audible wheezing, able to talk in full sentences  Remainder of exam unable to be completed due to telephone visits    Knows that she is  Due for labs - will do it this late spring /summer         Assessment/Plan:    ASSESSMENT/PLAN:      ICD-10-CM    1. Hypertension goal BP (blood pressure) < 130/80  I10    2. CARDIOVASCULAR SCREENING; LDL GOAL LESS THAN 130  Z13.6    3. Long term (current) use of " antithrombotics/antiplatelets  Z79.02    4. Cerebrovascular accident (CVA) due to thrombosis of cerebral artery (H)  I63.30        Patient Instructions   I so enjoy talking with you    Keep your amazing attitude toward life.   I did refill your medication      Take care of yourself.   This spring /summer please make an appointment at the clinic to be seen and to have labs checked and a face to face visit                 Phone call duration:  10 minutes

## 2020-11-12 NOTE — PATIENT INSTRUCTIONS
I so enjoy talking with you    Keep your amazing attitude toward life.   I did refill your medication      Take care of yourself.   This spring /summer please make an appointment at the clinic to be seen and to have labs checked and a face to face visit

## 2021-01-01 ENCOUNTER — MYC MEDICAL ADVICE (OUTPATIENT)
Dept: FAMILY MEDICINE | Facility: CLINIC | Age: 86
End: 2021-01-01

## 2021-01-01 ENCOUNTER — APPOINTMENT (OUTPATIENT)
Dept: OCCUPATIONAL THERAPY | Facility: CLINIC | Age: 86
DRG: 180 | End: 2021-01-01
Payer: COMMERCIAL

## 2021-01-01 ENCOUNTER — APPOINTMENT (OUTPATIENT)
Dept: PHYSICAL THERAPY | Facility: CLINIC | Age: 86
DRG: 180 | End: 2021-01-01
Payer: COMMERCIAL

## 2021-01-01 ENCOUNTER — ANCILLARY PROCEDURE (OUTPATIENT)
Dept: GENERAL RADIOLOGY | Facility: CLINIC | Age: 86
End: 2021-01-01
Attending: FAMILY MEDICINE
Payer: COMMERCIAL

## 2021-01-01 ENCOUNTER — APPOINTMENT (OUTPATIENT)
Dept: ULTRASOUND IMAGING | Facility: CLINIC | Age: 86
DRG: 180 | End: 2021-01-01
Payer: COMMERCIAL

## 2021-01-01 ENCOUNTER — TELEPHONE (OUTPATIENT)
Dept: CARDIOLOGY | Facility: CLINIC | Age: 86
End: 2021-01-01

## 2021-01-01 ENCOUNTER — TELEPHONE (OUTPATIENT)
Dept: FAMILY MEDICINE | Facility: CLINIC | Age: 86
End: 2021-01-01

## 2021-01-01 ENCOUNTER — APPOINTMENT (OUTPATIENT)
Dept: GENERAL RADIOLOGY | Facility: CLINIC | Age: 86
DRG: 180 | End: 2021-01-01
Attending: PEDIATRICS
Payer: COMMERCIAL

## 2021-01-01 ENCOUNTER — PATIENT OUTREACH (OUTPATIENT)
Dept: PULMONOLOGY | Facility: CLINIC | Age: 86
End: 2021-01-01

## 2021-01-01 ENCOUNTER — APPOINTMENT (OUTPATIENT)
Dept: CT IMAGING | Facility: CLINIC | Age: 86
DRG: 180 | End: 2021-01-01
Payer: COMMERCIAL

## 2021-01-01 ENCOUNTER — DISCHARGE SUMMARY NURSING HOME (OUTPATIENT)
Dept: GERIATRICS | Facility: CLINIC | Age: 86
End: 2021-01-01
Payer: COMMERCIAL

## 2021-01-01 ENCOUNTER — HOSPITAL ENCOUNTER (EMERGENCY)
Facility: CLINIC | Age: 86
End: 2021-08-20
Attending: EMERGENCY MEDICINE | Admitting: EMERGENCY MEDICINE
Payer: COMMERCIAL

## 2021-01-01 ENCOUNTER — OFFICE VISIT (OUTPATIENT)
Dept: FAMILY MEDICINE | Facility: CLINIC | Age: 86
End: 2021-01-01
Payer: COMMERCIAL

## 2021-01-01 ENCOUNTER — APPOINTMENT (OUTPATIENT)
Dept: GENERAL RADIOLOGY | Facility: CLINIC | Age: 86
DRG: 180 | End: 2021-01-01
Attending: INTERNAL MEDICINE
Payer: COMMERCIAL

## 2021-01-01 ENCOUNTER — MEDICAL CORRESPONDENCE (OUTPATIENT)
Dept: HEALTH INFORMATION MANAGEMENT | Facility: CLINIC | Age: 86
End: 2021-01-01

## 2021-01-01 ENCOUNTER — TRANSITIONAL CARE UNIT VISIT (OUTPATIENT)
Dept: GERIATRICS | Facility: CLINIC | Age: 86
End: 2021-01-01
Payer: COMMERCIAL

## 2021-01-01 ENCOUNTER — NURSING HOME VISIT (OUTPATIENT)
Dept: GERIATRICS | Facility: CLINIC | Age: 86
End: 2021-01-01
Payer: COMMERCIAL

## 2021-01-01 ENCOUNTER — APPOINTMENT (OUTPATIENT)
Dept: GENERAL RADIOLOGY | Facility: CLINIC | Age: 86
End: 2021-01-01
Attending: EMERGENCY MEDICINE
Payer: COMMERCIAL

## 2021-01-01 ENCOUNTER — DOCUMENTATION ONLY (OUTPATIENT)
Dept: LAB | Facility: CLINIC | Age: 86
End: 2021-01-01

## 2021-01-01 ENCOUNTER — HEALTH MAINTENANCE LETTER (OUTPATIENT)
Age: 86
End: 2021-01-01

## 2021-01-01 ENCOUNTER — APPOINTMENT (OUTPATIENT)
Dept: CARDIOLOGY | Facility: CLINIC | Age: 86
DRG: 180 | End: 2021-01-01
Payer: COMMERCIAL

## 2021-01-01 ENCOUNTER — APPOINTMENT (OUTPATIENT)
Dept: PET IMAGING | Facility: CLINIC | Age: 86
DRG: 180 | End: 2021-01-01
Attending: INTERNAL MEDICINE
Payer: COMMERCIAL

## 2021-01-01 ENCOUNTER — HOSPITAL ENCOUNTER (INPATIENT)
Facility: CLINIC | Age: 86
LOS: 12 days | Discharge: NURSING, REHAB OR LONG-TERM CARE | DRG: 180 | End: 2021-07-01
Attending: INTERNAL MEDICINE | Admitting: INTERNAL MEDICINE
Payer: COMMERCIAL

## 2021-01-01 ENCOUNTER — IMMUNIZATION (OUTPATIENT)
Dept: NURSING | Facility: CLINIC | Age: 86
End: 2021-01-01
Payer: COMMERCIAL

## 2021-01-01 ENCOUNTER — HOSPITAL ENCOUNTER (OUTPATIENT)
Dept: OCCUPATIONAL THERAPY | Facility: CLINIC | Age: 86
Setting detail: THERAPIES SERIES
End: 2021-04-19
Attending: FAMILY MEDICINE
Payer: COMMERCIAL

## 2021-01-01 ENCOUNTER — PATIENT OUTREACH (OUTPATIENT)
Dept: CARE COORDINATION | Facility: CLINIC | Age: 86
End: 2021-01-01

## 2021-01-01 ENCOUNTER — NURSE TRIAGE (OUTPATIENT)
Dept: NURSING | Facility: CLINIC | Age: 86
End: 2021-01-01

## 2021-01-01 ENCOUNTER — APPOINTMENT (OUTPATIENT)
Dept: CT IMAGING | Facility: CLINIC | Age: 86
DRG: 180 | End: 2021-01-01
Attending: INTERNAL MEDICINE
Payer: COMMERCIAL

## 2021-01-01 VITALS
TEMPERATURE: 98.3 F | BODY MASS INDEX: 37.4 KG/M2 | WEIGHT: 224.5 LBS | SYSTOLIC BLOOD PRESSURE: 122 MMHG | RESPIRATION RATE: 17 BRPM | HEIGHT: 65 IN | DIASTOLIC BLOOD PRESSURE: 71 MMHG | OXYGEN SATURATION: 90 % | HEART RATE: 95 BPM

## 2021-01-01 VITALS
HEIGHT: 65 IN | HEART RATE: 84 BPM | DIASTOLIC BLOOD PRESSURE: 67 MMHG | TEMPERATURE: 96.8 F | SYSTOLIC BLOOD PRESSURE: 106 MMHG | WEIGHT: 218.9 LBS | RESPIRATION RATE: 17 BRPM | OXYGEN SATURATION: 95 % | BODY MASS INDEX: 36.47 KG/M2

## 2021-01-01 VITALS
SYSTOLIC BLOOD PRESSURE: 113 MMHG | HEIGHT: 65 IN | HEART RATE: 97 BPM | DIASTOLIC BLOOD PRESSURE: 72 MMHG | WEIGHT: 225.7 LBS | BODY MASS INDEX: 37.61 KG/M2 | RESPIRATION RATE: 18 BRPM | OXYGEN SATURATION: 91 % | TEMPERATURE: 98.2 F

## 2021-01-01 VITALS
TEMPERATURE: 98.2 F | DIASTOLIC BLOOD PRESSURE: 71 MMHG | OXYGEN SATURATION: 92 % | BODY MASS INDEX: 38.14 KG/M2 | WEIGHT: 228.9 LBS | RESPIRATION RATE: 16 BRPM | SYSTOLIC BLOOD PRESSURE: 112 MMHG | HEART RATE: 83 BPM | HEIGHT: 65 IN

## 2021-01-01 VITALS
DIASTOLIC BLOOD PRESSURE: 61 MMHG | WEIGHT: 236.33 LBS | SYSTOLIC BLOOD PRESSURE: 147 MMHG | HEIGHT: 65 IN | RESPIRATION RATE: 16 BRPM | HEART RATE: 84 BPM | TEMPERATURE: 98.8 F | OXYGEN SATURATION: 94 % | BODY MASS INDEX: 39.38 KG/M2

## 2021-01-01 VITALS
WEIGHT: 224.3 LBS | RESPIRATION RATE: 16 BRPM | TEMPERATURE: 96.9 F | HEIGHT: 65 IN | OXYGEN SATURATION: 92 % | SYSTOLIC BLOOD PRESSURE: 133 MMHG | BODY MASS INDEX: 37.37 KG/M2 | HEART RATE: 94 BPM | DIASTOLIC BLOOD PRESSURE: 74 MMHG

## 2021-01-01 VITALS
DIASTOLIC BLOOD PRESSURE: 69 MMHG | SYSTOLIC BLOOD PRESSURE: 130 MMHG | BODY MASS INDEX: 37.75 KG/M2 | HEART RATE: 104 BPM | HEIGHT: 65 IN | WEIGHT: 226.6 LBS | TEMPERATURE: 98 F | OXYGEN SATURATION: 93 % | RESPIRATION RATE: 17 BRPM

## 2021-01-01 VITALS
SYSTOLIC BLOOD PRESSURE: 134 MMHG | TEMPERATURE: 98.5 F | WEIGHT: 234.3 LBS | HEIGHT: 65 IN | BODY MASS INDEX: 39.04 KG/M2 | RESPIRATION RATE: 16 BRPM | OXYGEN SATURATION: 93 % | DIASTOLIC BLOOD PRESSURE: 82 MMHG | HEART RATE: 100 BPM

## 2021-01-01 VITALS
HEIGHT: 65 IN | OXYGEN SATURATION: 95 % | WEIGHT: 235.2 LBS | HEART RATE: 79 BPM | BODY MASS INDEX: 39.18 KG/M2 | TEMPERATURE: 97.4 F | DIASTOLIC BLOOD PRESSURE: 60 MMHG | SYSTOLIC BLOOD PRESSURE: 106 MMHG | RESPIRATION RATE: 17 BRPM

## 2021-01-01 VITALS
BODY MASS INDEX: 40.68 KG/M2 | HEART RATE: 85 BPM | SYSTOLIC BLOOD PRESSURE: 134 MMHG | TEMPERATURE: 97.8 F | WEIGHT: 237 LBS | DIASTOLIC BLOOD PRESSURE: 70 MMHG | OXYGEN SATURATION: 98 %

## 2021-01-01 VITALS
SYSTOLIC BLOOD PRESSURE: 126 MMHG | OXYGEN SATURATION: 96 % | HEART RATE: 98 BPM | TEMPERATURE: 98.3 F | DIASTOLIC BLOOD PRESSURE: 70 MMHG | RESPIRATION RATE: 24 BRPM

## 2021-01-01 VITALS
HEIGHT: 65 IN | OXYGEN SATURATION: 94 % | RESPIRATION RATE: 19 BRPM | WEIGHT: 222.5 LBS | BODY MASS INDEX: 37.07 KG/M2 | SYSTOLIC BLOOD PRESSURE: 129 MMHG | HEART RATE: 83 BPM | TEMPERATURE: 98.5 F | DIASTOLIC BLOOD PRESSURE: 91 MMHG

## 2021-01-01 VITALS
WEIGHT: 251.99 LBS | SYSTOLIC BLOOD PRESSURE: 79 MMHG | OXYGEN SATURATION: 88 % | DIASTOLIC BLOOD PRESSURE: 55 MMHG | BODY MASS INDEX: 41.93 KG/M2

## 2021-01-01 DIAGNOSIS — E83.52 HYPERCALCEMIA: ICD-10-CM

## 2021-01-01 DIAGNOSIS — J96.01 ACUTE RESPIRATORY FAILURE WITH HYPOXIA (H): ICD-10-CM

## 2021-01-01 DIAGNOSIS — E21.3 HYPERPARATHYROIDISM (H): ICD-10-CM

## 2021-01-01 DIAGNOSIS — E87.1 CHRONIC HYPONATREMIA: ICD-10-CM

## 2021-01-01 DIAGNOSIS — R26.89 DECREASED MOBILITY: Primary | ICD-10-CM

## 2021-01-01 DIAGNOSIS — R26.89 DECREASED MOBILITY: ICD-10-CM

## 2021-01-01 DIAGNOSIS — E04.1 THYROID NODULE: ICD-10-CM

## 2021-01-01 DIAGNOSIS — R06.2 WHEEZING: ICD-10-CM

## 2021-01-01 DIAGNOSIS — C79.9 METASTATIC ADENOCARCINOMA (H): ICD-10-CM

## 2021-01-01 DIAGNOSIS — J91.0 MALIGNANT PLEURAL EFFUSION (H): Primary | ICD-10-CM

## 2021-01-01 DIAGNOSIS — Z86.73 HISTORY OF CVA (CEREBROVASCULAR ACCIDENT): ICD-10-CM

## 2021-01-01 DIAGNOSIS — N18.30 STAGE 3 CHRONIC KIDNEY DISEASE, UNSPECIFIED WHETHER STAGE 3A OR 3B CKD (H): ICD-10-CM

## 2021-01-01 DIAGNOSIS — R60.0 PERIPHERAL EDEMA: ICD-10-CM

## 2021-01-01 DIAGNOSIS — J90 PLEURAL EFFUSION: ICD-10-CM

## 2021-01-01 DIAGNOSIS — Z13.6 CARDIOVASCULAR SCREENING; LDL GOAL LESS THAN 130: ICD-10-CM

## 2021-01-01 DIAGNOSIS — I63.10 CEREBROVASCULAR ACCIDENT (CVA) DUE TO EMBOLISM OF PRECEREBRAL ARTERY (H): Primary | ICD-10-CM

## 2021-01-01 DIAGNOSIS — I46.9 CARDIAC ARREST (H): ICD-10-CM

## 2021-01-01 DIAGNOSIS — Z86.73 HISTORY OF EMBOLIC STROKE: ICD-10-CM

## 2021-01-01 DIAGNOSIS — R91.8 PULMONARY NODULES: ICD-10-CM

## 2021-01-01 DIAGNOSIS — R06.2 WHEEZE: ICD-10-CM

## 2021-01-01 DIAGNOSIS — J18.9 OBSTRUCTIVE PNEUMONIA: ICD-10-CM

## 2021-01-01 DIAGNOSIS — I63.30 CEREBROVASCULAR ACCIDENT (CVA) DUE TO THROMBOSIS OF CEREBRAL ARTERY (H): ICD-10-CM

## 2021-01-01 DIAGNOSIS — M62.81 GENERALIZED MUSCLE WEAKNESS: ICD-10-CM

## 2021-01-01 DIAGNOSIS — J91.0 MALIGNANT PLEURAL EFFUSION (H): ICD-10-CM

## 2021-01-01 DIAGNOSIS — C79.51 BONE METASTASIS: ICD-10-CM

## 2021-01-01 DIAGNOSIS — C80.1 ADENOCARCINOMA (H): ICD-10-CM

## 2021-01-01 DIAGNOSIS — Z96.641 PRESENCE OF RIGHT ARTIFICIAL HIP JOINT: Primary | ICD-10-CM

## 2021-01-01 DIAGNOSIS — R53.81 PHYSICAL DECONDITIONING: Primary | ICD-10-CM

## 2021-01-01 DIAGNOSIS — I10 HYPERTENSION GOAL BP (BLOOD PRESSURE) < 130/80: ICD-10-CM

## 2021-01-01 DIAGNOSIS — Z20.822 COVID-19 RULED OUT BY LABORATORY TESTING: ICD-10-CM

## 2021-01-01 DIAGNOSIS — R06.02 EXERTIONAL SHORTNESS OF BREATH: ICD-10-CM

## 2021-01-01 DIAGNOSIS — E66.01 MORBIDLY OBESE (H): ICD-10-CM

## 2021-01-01 DIAGNOSIS — Z87.891 PERSONAL HISTORY OF TOBACCO USE, PRESENTING HAZARDS TO HEALTH: ICD-10-CM

## 2021-01-01 DIAGNOSIS — G89.29 OTHER CHRONIC PAIN: ICD-10-CM

## 2021-01-01 DIAGNOSIS — E78.5 HYPERLIPIDEMIA, UNSPECIFIED HYPERLIPIDEMIA TYPE: ICD-10-CM

## 2021-01-01 DIAGNOSIS — Z79.02 LONG TERM (CURRENT) USE OF ANTITHROMBOTICS/ANTIPLATELETS: ICD-10-CM

## 2021-01-01 DIAGNOSIS — Z53.9 DIAGNOSIS NOT YET DEFINED: Primary | ICD-10-CM

## 2021-01-01 DIAGNOSIS — R06.02 EXERTIONAL SHORTNESS OF BREATH: Primary | ICD-10-CM

## 2021-01-01 DIAGNOSIS — E87.1 HYPONATREMIA: ICD-10-CM

## 2021-01-01 DIAGNOSIS — Z11.52 ENCOUNTER FOR SCREENING LABORATORY TESTING FOR SEVERE ACUTE RESPIRATORY SYNDROME CORONAVIRUS 2 (SARS-COV-2): ICD-10-CM

## 2021-01-01 DIAGNOSIS — C80.1 ADENOCARCINOMA (H): Primary | ICD-10-CM

## 2021-01-01 DIAGNOSIS — N18.30 STAGE 3 CHRONIC KIDNEY DISEASE, UNSPECIFIED WHETHER STAGE 3A OR 3B CKD (H): Primary | ICD-10-CM

## 2021-01-01 LAB
ACID FAST STN SPEC QL: NORMAL
ALBUMIN SERPL-MCNC: 1.7 G/DL (ref 3.4–5)
ALBUMIN SERPL-MCNC: 3.4 G/DL (ref 3.4–5)
ALBUMIN SERPL-MCNC: 3.5 G/DL (ref 3.4–5)
ALBUMIN UR-MCNC: 20 MG/DL
ALP SERPL-CCNC: 89 U/L (ref 40–150)
ALP SERPL-CCNC: 91 U/L (ref 40–150)
ALP SERPL-CCNC: 97 U/L (ref 40–150)
ALT SERPL W P-5'-P-CCNC: 164 U/L (ref 0–50)
ALT SERPL W P-5'-P-CCNC: 17 U/L (ref 0–50)
ALT SERPL W P-5'-P-CCNC: 20 U/L (ref 0–50)
ANION GAP SERPL CALCULATED.3IONS-SCNC: 10 MMOL/L (ref 3–14)
ANION GAP SERPL CALCULATED.3IONS-SCNC: 10 MMOL/L (ref 3–14)
ANION GAP SERPL CALCULATED.3IONS-SCNC: 17 MMOL/L (ref 3–14)
ANION GAP SERPL CALCULATED.3IONS-SCNC: 5 MMOL/L (ref 3–14)
ANION GAP SERPL CALCULATED.3IONS-SCNC: 5 MMOL/L (ref 3–14)
ANION GAP SERPL CALCULATED.3IONS-SCNC: 6 MMOL/L (ref 3–14)
ANION GAP SERPL CALCULATED.3IONS-SCNC: 7 MMOL/L (ref 3–14)
ANION GAP SERPL CALCULATED.3IONS-SCNC: 8 MMOL/L (ref 3–14)
APPEARANCE FLD: NORMAL
APPEARANCE FLD: NORMAL
APPEARANCE UR: ABNORMAL
AST SERPL W P-5'-P-CCNC: 13 U/L (ref 0–45)
AST SERPL W P-5'-P-CCNC: 15 U/L (ref 0–45)
AST SERPL W P-5'-P-CCNC: 203 U/L (ref 0–45)
BACTERIA #/AREA URNS HPF: ABNORMAL /HPF
BACTERIA SPEC CULT: ABNORMAL
BACTERIA SPEC CULT: NO GROWTH
BACTERIA SPEC CULT: NORMAL
BASE EXCESS BLDA CALC-SCNC: -11.3 MMOL/L (ref -9–1.8)
BASE EXCESS BLDA CALC-SCNC: -9.6 MMOL/L (ref -9–1.8)
BASOPHILS # BLD AUTO: 0 10E9/L (ref 0–0.2)
BASOPHILS # BLD MANUAL: 0.2 10E3/UL (ref 0–0.2)
BASOPHILS NFR BLD AUTO: 0.4 %
BASOPHILS NFR BLD MANUAL: 1 %
BILIRUB SERPL-MCNC: 0.2 MG/DL (ref 0.2–1.3)
BILIRUB SERPL-MCNC: 0.4 MG/DL (ref 0.2–1.3)
BILIRUB SERPL-MCNC: 0.4 MG/DL (ref 0.2–1.3)
BILIRUB UR QL STRIP: NEGATIVE
BUN SERPL-MCNC: 17 MG/DL (ref 7–30)
BUN SERPL-MCNC: 17 MG/DL (ref 7–30)
BUN SERPL-MCNC: 19 MG/DL (ref 7–30)
BUN SERPL-MCNC: 20 MG/DL (ref 7–30)
BUN SERPL-MCNC: 22 MG/DL (ref 7–30)
BUN SERPL-MCNC: 25 MG/DL (ref 7–30)
BUN SERPL-MCNC: 25 MG/DL (ref 7–30)
BUN SERPL-MCNC: 37 MG/DL (ref 7–30)
BURR CELLS BLD QL SMEAR: ABNORMAL
CA-I BLD-MCNC: 5 MG/DL (ref 4.4–5.2)
CA-I BLD-MCNC: 5.7 MG/DL (ref 4.4–5.2)
CALCIUM SERPL-MCNC: 10.1 MG/DL (ref 8.5–10.1)
CALCIUM SERPL-MCNC: 10.3 MG/DL (ref 8.5–10.1)
CALCIUM SERPL-MCNC: 10.4 MG/DL (ref 8.5–10.1)
CALCIUM SERPL-MCNC: 8.9 MG/DL (ref 8.5–10.1)
CALCIUM SERPL-MCNC: 9.4 MG/DL (ref 8.5–10.1)
CALCIUM SERPL-MCNC: 9.6 MG/DL (ref 8.5–10.1)
CALCIUM SERPL-MCNC: 9.8 MG/DL (ref 8.5–10.1)
CALCIUM SERPL-MCNC: 9.8 MG/DL (ref 8.5–10.1)
CHLORIDE BLD-SCNC: 99 MMOL/L (ref 94–109)
CHLORIDE SERPL-SCNC: 102 MMOL/L (ref 94–109)
CHLORIDE SERPL-SCNC: 96 MMOL/L (ref 94–109)
CHLORIDE SERPL-SCNC: 96 MMOL/L (ref 94–109)
CHLORIDE SERPL-SCNC: 97 MMOL/L (ref 94–109)
CHLORIDE SERPL-SCNC: 97 MMOL/L (ref 94–109)
CHLORIDE SERPL-SCNC: 98 MMOL/L (ref 94–109)
CHLORIDE SERPL-SCNC: 99 MMOL/L (ref 94–109)
CHOLEST SERPL-MCNC: 195 MG/DL
CO2 BLDCOV-SCNC: 27 MMOL/L (ref 21–28)
CO2 SERPL-SCNC: 14 MMOL/L (ref 20–32)
CO2 SERPL-SCNC: 23 MMOL/L (ref 20–32)
CO2 SERPL-SCNC: 24 MMOL/L (ref 20–32)
CO2 SERPL-SCNC: 25 MMOL/L (ref 20–32)
CO2 SERPL-SCNC: 26 MMOL/L (ref 20–32)
COHGB MFR BLD: 78 % (ref 92–100)
COLOR FLD: NORMAL
COLOR FLD: YELLOW
COLOR UR AUTO: YELLOW
COPATH REPORT: NORMAL
COPATH REPORT: NORMAL
CORTIS SERPL-MCNC: 20.6 UG/DL (ref 4–22)
CORTIS SERPL-MCNC: 35.4 UG/DL (ref 4–22)
CORTIS SERPL-MCNC: 41.5 UG/DL (ref 4–22)
CPB POCT: NO
CREAT SERPL-MCNC: 0.84 MG/DL (ref 0.52–1.04)
CREAT SERPL-MCNC: 0.88 MG/DL (ref 0.52–1.04)
CREAT SERPL-MCNC: 0.91 MG/DL (ref 0.52–1.04)
CREAT SERPL-MCNC: 0.93 MG/DL (ref 0.52–1.04)
CREAT SERPL-MCNC: 0.93 MG/DL (ref 0.52–1.04)
CREAT SERPL-MCNC: 0.94 MG/DL (ref 0.52–1.04)
CREAT SERPL-MCNC: 0.96 MG/DL (ref 0.52–1.04)
CREAT SERPL-MCNC: 1.02 MG/DL (ref 0.52–1.04)
CREAT SERPL-MCNC: 1.07 MG/DL (ref 0.52–1.04)
CREAT SERPL-MCNC: 1.1 MG/DL (ref 0.52–1.04)
CRP SERPL-MCNC: 16 MG/L (ref 0–8)
CRP SERPL-MCNC: 28 MG/L (ref 0–8)
DIFFERENTIAL METHOD BLD: ABNORMAL
EOSINOPHIL # BLD AUTO: 0 10E9/L (ref 0–0.7)
EOSINOPHIL # BLD MANUAL: 1 10E3/UL (ref 0–0.7)
EOSINOPHIL NFR BLD AUTO: 0.4 %
EOSINOPHIL NFR BLD MANUAL: 5 %
ERYTHROCYTE [DISTWIDTH] IN BLOOD BY AUTOMATED COUNT: 13.9 % (ref 10–15)
ERYTHROCYTE [DISTWIDTH] IN BLOOD BY AUTOMATED COUNT: 13.9 % (ref 10–15)
ERYTHROCYTE [DISTWIDTH] IN BLOOD BY AUTOMATED COUNT: 14 % (ref 10–15)
ERYTHROCYTE [DISTWIDTH] IN BLOOD BY AUTOMATED COUNT: 14.5 % (ref 10–15)
ERYTHROCYTE [DISTWIDTH] IN BLOOD BY AUTOMATED COUNT: 14.5 % (ref 10–15)
ERYTHROCYTE [SEDIMENTATION RATE] IN BLOOD BY WESTERGREN METHOD: 16 MM/H (ref 0–30)
FUNGUS SPEC CULT: NORMAL
FUNGUS SPEC CULT: NORMAL
GAMMA INTERFERON BACKGROUND BLD IA-ACNC: 0 IU/ML
GFR SERPL CREATININE-BSD FRML MDRD: 46 ML/MIN/{1.73_M2}
GFR SERPL CREATININE-BSD FRML MDRD: 48 ML/MIN/{1.73_M2}
GFR SERPL CREATININE-BSD FRML MDRD: 50 ML/MIN/{1.73_M2}
GFR SERPL CREATININE-BSD FRML MDRD: 54 ML/MIN/{1.73_M2}
GFR SERPL CREATININE-BSD FRML MDRD: 56 ML/MIN/1.73M2
GFR SERPL CREATININE-BSD FRML MDRD: 56 ML/MIN/{1.73_M2}
GFR SERPL CREATININE-BSD FRML MDRD: 56 ML/MIN/{1.73_M2}
GFR SERPL CREATININE-BSD FRML MDRD: 58 ML/MIN/{1.73_M2}
GFR SERPL CREATININE-BSD FRML MDRD: 60 ML/MIN/{1.73_M2}
GFR SERPL CREATININE-BSD FRML MDRD: 64 ML/MIN/{1.73_M2}
GLUCOSE BLD-MCNC: 247 MG/DL (ref 70–99)
GLUCOSE BLD-MCNC: 267 MG/DL (ref 70–99)
GLUCOSE BLDC GLUCOMTR-MCNC: 268 MG/DL (ref 70–99)
GLUCOSE FLD-MCNC: 103 MG/DL
GLUCOSE FLD-MCNC: 229 MG/DL
GLUCOSE SERPL-MCNC: 102 MG/DL (ref 70–99)
GLUCOSE SERPL-MCNC: 108 MG/DL (ref 70–99)
GLUCOSE SERPL-MCNC: 120 MG/DL (ref 70–99)
GLUCOSE SERPL-MCNC: 122 MG/DL (ref 70–99)
GLUCOSE SERPL-MCNC: 142 MG/DL (ref 70–99)
GLUCOSE SERPL-MCNC: 153 MG/DL (ref 70–99)
GLUCOSE SERPL-MCNC: 91 MG/DL (ref 70–99)
GLUCOSE SERPL-MCNC: 98 MG/DL (ref 70–99)
GLUCOSE SERPL-MCNC: 98 MG/DL (ref 70–99)
GLUCOSE UR STRIP-MCNC: NEGATIVE MG/DL
GRAM STN SPEC: NORMAL
HCO3 BLD-SCNC: 19 MMOL/L (ref 21–28)
HCO3 BLD-SCNC: 20 MMOL/L (ref 21–28)
HCO3 BLDA-SCNC: 20 MMOL/L (ref 21–28)
HCO3 BLDV-SCNC: 20 MMOL/L (ref 21–28)
HCT VFR BLD AUTO: 34.9 % (ref 35–47)
HCT VFR BLD AUTO: 43.9 % (ref 35–47)
HCT VFR BLD AUTO: 44.8 % (ref 35–47)
HCT VFR BLD AUTO: 45 % (ref 35–47)
HCT VFR BLD AUTO: 45.4 % (ref 35–47)
HCT VFR BLD AUTO: 45.7 % (ref 35–47)
HCT VFR BLD AUTO: 45.9 % (ref 35–47)
HCT VFR BLD AUTO: 46.8 % (ref 35–47)
HCT VFR BLD CALC: 37 % (ref 35–47)
HDLC SERPL-MCNC: 60 MG/DL
HGB BLD-MCNC: 10.7 G/DL (ref 11.7–15.7)
HGB BLD-MCNC: 12.6 G/DL (ref 11.7–15.7)
HGB BLD-MCNC: 14.4 G/DL (ref 11.7–15.7)
HGB BLD-MCNC: 14.6 G/DL (ref 11.7–15.7)
HGB BLD-MCNC: 14.8 G/DL (ref 11.7–15.7)
HGB BLD-MCNC: 14.8 G/DL (ref 11.7–15.7)
HGB BLD-MCNC: 15 G/DL (ref 11.7–15.7)
HGB BLD-MCNC: 15.2 G/DL (ref 11.7–15.7)
HGB BLD-MCNC: 15.2 G/DL (ref 11.7–15.7)
HGB UR QL STRIP: ABNORMAL
HOLD SPECIMEN: NORMAL
HOLD SPECIMEN: NORMAL
IMM GRANULOCYTES # BLD: 0 10E9/L (ref 0–0.4)
IMM GRANULOCYTES NFR BLD: 0.4 %
INR PPP: 1.1 (ref 0.86–1.14)
INTERPRETATION ECG - MUSE: NORMAL
KETONES UR STRIP-MCNC: NEGATIVE MG/DL
LABORATORY COMMENT REPORT: NORMAL
LABORATORY COMMENT REPORT: NORMAL
LACTATE BLD-SCNC: 0.7 MMOL/L (ref 0.7–2.1)
LACTATE BLD-SCNC: 0.8 MMOL/L (ref 0.7–2)
LACTATE BLD-SCNC: 1.1 MMOL/L (ref 0.7–2)
LACTATE BLD-SCNC: 1.6 MMOL/L (ref 0.7–2)
LACTATE BLD-SCNC: 2.7 MMOL/L (ref 0.7–2)
LACTATE BLD-SCNC: 7.3 MMOL/L
LACTATE SERPL-SCNC: 7.5 MMOL/L (ref 0.7–2)
LACTATE SERPL-SCNC: 8.7 MMOL/L (ref 0.7–2)
LDH FLD L TO P-CCNC: 426 U/L
LDH FLD L TO P-CCNC: 442 U/L
LDH SERPL L TO P-CCNC: 135 U/L (ref 81–234)
LDH SERPL L TO P-CCNC: 145 U/L (ref 81–234)
LDLC SERPL CALC-MCNC: 118 MG/DL
LEUKOCYTE ESTERASE UR QL STRIP: ABNORMAL
LYMPHOCYTES # BLD AUTO: 0.7 10E9/L (ref 0.8–5.3)
LYMPHOCYTES # BLD MANUAL: 4.3 10E3/UL (ref 0.8–5.3)
LYMPHOCYTES NFR BLD AUTO: 7.7 %
LYMPHOCYTES NFR BLD MANUAL: 22 %
LYMPHOCYTES NFR FLD MANUAL: 16 %
LYMPHOCYTES NFR FLD MANUAL: 56 %
Lab: ABNORMAL
Lab: NORMAL
M TB IFN-G CD4+ BCKGRND COR BLD-ACNC: 7.85 IU/ML
M TB TUBERC IFN-G BLD QL: NEGATIVE
MAGNESIUM SERPL-MCNC: 2.1 MG/DL (ref 1.6–2.3)
MAGNESIUM SERPL-MCNC: 2.2 MG/DL (ref 1.6–2.3)
MAGNESIUM SERPL-MCNC: 2.3 MG/DL (ref 1.6–2.3)
MAGNESIUM SERPL-MCNC: 2.3 MG/DL (ref 1.6–2.3)
MAGNESIUM SERPL-MCNC: 2.4 MG/DL (ref 1.6–2.3)
MAGNESIUM SERPL-MCNC: 2.4 MG/DL (ref 1.6–2.3)
MCH RBC QN AUTO: 27.8 PG (ref 26.5–33)
MCH RBC QN AUTO: 28.6 PG (ref 26.5–33)
MCH RBC QN AUTO: 29 PG (ref 26.5–33)
MCH RBC QN AUTO: 29 PG (ref 26.5–33)
MCH RBC QN AUTO: 29.1 PG (ref 26.5–33)
MCH RBC QN AUTO: 29.6 PG (ref 26.5–33)
MCHC RBC AUTO-ENTMCNC: 30.7 G/DL (ref 31.5–36.5)
MCHC RBC AUTO-ENTMCNC: 31.5 G/DL (ref 31.5–36.5)
MCHC RBC AUTO-ENTMCNC: 32.4 G/DL (ref 31.5–36.5)
MCHC RBC AUTO-ENTMCNC: 32.5 G/DL (ref 31.5–36.5)
MCHC RBC AUTO-ENTMCNC: 33 G/DL (ref 31.5–36.5)
MCHC RBC AUTO-ENTMCNC: 33 G/DL (ref 31.5–36.5)
MCHC RBC AUTO-ENTMCNC: 33.1 G/DL (ref 31.5–36.5)
MCHC RBC AUTO-ENTMCNC: 33.7 G/DL (ref 31.5–36.5)
MCV RBC AUTO: 88 FL (ref 78–100)
MCV RBC AUTO: 89 FL (ref 78–100)
MCV RBC AUTO: 90 FL (ref 78–100)
MCV RBC AUTO: 91 FL (ref 78–100)
MCV RBC AUTO: 91 FL (ref 78–100)
MITOGEN IGNF BCKGRD COR BLD-ACNC: 0 IU/ML
MITOGEN IGNF BCKGRD COR BLD-ACNC: 0 IU/ML
MONOCYTES # BLD AUTO: 0.6 10E9/L (ref 0–1.3)
MONOCYTES # BLD MANUAL: 0.6 10E3/UL (ref 0–1.3)
MONOCYTES NFR BLD AUTO: 6.1 %
MONOCYTES NFR BLD MANUAL: 3 %
MONOS+MACROS NFR FLD MANUAL: 16 %
MONOS+MACROS NFR FLD MANUAL: 5 %
MUCOUS THREADS #/AREA URNS LPF: PRESENT /LPF
MYCOBACTERIUM SPEC CULT: NORMAL
MYCOBACTERIUM SPEC CULT: NORMAL
MYELOCYTES # BLD MANUAL: 0.4 10E3/UL
MYELOCYTES NFR BLD MANUAL: 2 %
NEUTROPHILS # BLD AUTO: 8 10E9/L (ref 1.6–8.3)
NEUTROPHILS # BLD MANUAL: 13.1 10E3/UL (ref 1.6–8.3)
NEUTROPHILS NFR BLD AUTO: 85 %
NEUTROPHILS NFR BLD MANUAL: 67 %
NEUTS BAND NFR FLD MANUAL: 7 %
NITRATE UR QL: NEGATIVE
NONHDLC SERPL-MCNC: 135 MG/DL
NRBC # BLD AUTO: 0 10*3/UL
NRBC # BLD AUTO: 0.2 10E3/UL
NRBC BLD AUTO-RTO: 0 /100
NRBC BLD MANUAL-RTO: 1 %
NT-PROBNP SERPL-MCNC: 2383 PG/ML (ref 0–1800)
NT-PROBNP SERPL-MCNC: 249 PG/ML (ref 0–1800)
O2/TOTAL GAS SETTING VFR VENT: 100 %
O2/TOTAL GAS SETTING VFR VENT: 100 %
OSMOLALITY SERPL: 278 MMOL/KG (ref 280–301)
OSMOLALITY UR: 669 MMOL/KG (ref 100–1200)
OTHER CELLS FLD MANUAL: 21 %
OTHER CELLS FLD MANUAL: 79 %
PCO2 BLD: 59 MM HG (ref 35–45)
PCO2 BLD: 68 MM HG (ref 35–45)
PCO2 BLDA: 77 MM HG (ref 35–45)
PCO2 BLDV: 47 MM HG (ref 40–50)
PCO2 BLDV: 73 MM HG (ref 40–50)
PH BLD: 7.07 [PH] (ref 7.35–7.45)
PH BLD: 7.13 [PH] (ref 7.35–7.45)
PH BLDA: 7.03 [PH] (ref 7.35–7.45)
PH BLDV: 7.04 [PH] (ref 7.32–7.43)
PH BLDV: 7.37 PH (ref 7.32–7.43)
PH FLD: 7.8 PH
PH UR STRIP: 6 [PH] (ref 5–7)
PHOSPHATE SERPL-MCNC: 2.5 MG/DL (ref 2.5–4.5)
PLAT MORPH BLD: ABNORMAL
PLATELET # BLD AUTO: 329 10E9/L (ref 150–450)
PLATELET # BLD AUTO: 334 10E9/L (ref 150–450)
PLATELET # BLD AUTO: 350 10E9/L (ref 150–450)
PLATELET # BLD AUTO: 360 10E9/L (ref 150–450)
PLATELET # BLD AUTO: 368 10E9/L (ref 150–450)
PLATELET # BLD AUTO: 379 10E9/L (ref 150–450)
PLATELET # BLD AUTO: 384 10E3/UL (ref 150–450)
PLATELET # BLD AUTO: 393 10E9/L (ref 150–450)
PO2 BLD: 131 MM HG (ref 80–105)
PO2 BLD: 76 MM HG (ref 80–105)
PO2 BLDA: 63 MM HG (ref 80–105)
PO2 BLDV: 30 MM HG (ref 25–47)
PO2 BLDV: 63 MM HG (ref 25–47)
POTASSIUM BLD-SCNC: 5.4 MMOL/L (ref 3.4–5.3)
POTASSIUM BLD-SCNC: 6.3 MMOL/L (ref 3.4–5.3)
POTASSIUM SERPL-SCNC: 4.4 MMOL/L (ref 3.4–5.3)
POTASSIUM SERPL-SCNC: 4.5 MMOL/L (ref 3.4–5.3)
POTASSIUM SERPL-SCNC: 4.6 MMOL/L (ref 3.4–5.3)
POTASSIUM SERPL-SCNC: 4.7 MMOL/L (ref 3.4–5.3)
POTASSIUM SERPL-SCNC: 4.7 MMOL/L (ref 3.4–5.3)
POTASSIUM SERPL-SCNC: 4.9 MMOL/L (ref 3.4–5.3)
POTASSIUM SERPL-SCNC: 5 MMOL/L (ref 3.4–5.3)
POTASSIUM SERPL-SCNC: 5.1 MMOL/L (ref 3.4–5.3)
PROCALCITONIN SERPL-MCNC: <0.05 NG/ML
PROT FLD-MCNC: 3.5 G/DL
PROT FLD-MCNC: 4.3 G/DL
PROT SERPL-MCNC: 5.2 G/DL (ref 6.8–8.8)
PROT SERPL-MCNC: 6.2 G/DL (ref 6.8–8.8)
PROT SERPL-MCNC: 7.2 G/DL (ref 6.8–8.8)
PROT SERPL-MCNC: 7.4 G/DL (ref 6.8–8.8)
PROT SERPL-MCNC: 7.6 G/DL (ref 6.8–8.8)
PTH-INTACT SERPL-MCNC: 296 PG/ML (ref 18–80)
RADIOLOGIST FLAGS: ABNORMAL
RADIOLOGIST FLAGS: NORMAL
RBC # BLD AUTO: 3.85 10E6/UL (ref 3.8–5.2)
RBC # BLD AUTO: 5 10E12/L (ref 3.8–5.2)
RBC # BLD AUTO: 5.03 10E12/L (ref 3.8–5.2)
RBC # BLD AUTO: 5.03 10E12/L (ref 3.8–5.2)
RBC # BLD AUTO: 5.09 10E12/L (ref 3.8–5.2)
RBC # BLD AUTO: 5.16 10E12/L (ref 3.8–5.2)
RBC # BLD AUTO: 5.23 10E12/L (ref 3.8–5.2)
RBC # BLD AUTO: 5.24 10E12/L (ref 3.8–5.2)
RBC MORPH BLD: ABNORMAL
RBC URINE: 4 /HPF
SAO2 % BLDV FROM PO2: 54 %
SAO2 % BLDV: 79 % (ref 94–100)
SARS-COV-2 RNA RESP QL NAA+PROBE: NEGATIVE
SODIUM BLD-SCNC: 124 MMOL/L (ref 133–144)
SODIUM SERPL-SCNC: 127 MMOL/L (ref 133–144)
SODIUM SERPL-SCNC: 128 MMOL/L (ref 133–144)
SODIUM SERPL-SCNC: 129 MMOL/L (ref 133–144)
SODIUM SERPL-SCNC: 130 MMOL/L (ref 133–144)
SODIUM SERPL-SCNC: 131 MMOL/L (ref 133–144)
SODIUM SERPL-SCNC: 132 MMOL/L (ref 133–144)
SODIUM UR-SCNC: 72 MMOL/L
SP GR UR STRIP: 1.01 (ref 1–1.03)
SPECIMEN SOURCE FLD: NORMAL
SPECIMEN SOURCE: ABNORMAL
SPECIMEN SOURCE: NORMAL
SQUAMOUS EPITHELIAL: 2 /HPF
TRIGL SERPL-MCNC: 87 MG/DL
TROPONIN I SERPL-MCNC: 0.49 UG/L (ref 0–0.04)
TROPONIN I SERPL-MCNC: <0.015 UG/L (ref 0–0.04)
TSH SERPL DL<=0.005 MIU/L-ACNC: 1.07 MU/L (ref 0.4–4)
UROBILINOGEN UR STRIP-MCNC: NORMAL MG/DL
WBC # BLD AUTO: 10 10E9/L (ref 4–11)
WBC # BLD AUTO: 10.3 10E9/L (ref 4–11)
WBC # BLD AUTO: 19.5 10E3/UL (ref 4–11)
WBC # BLD AUTO: 6.2 10E9/L (ref 4–11)
WBC # BLD AUTO: 6.7 10E9/L (ref 4–11)
WBC # BLD AUTO: 8.2 10E9/L (ref 4–11)
WBC # BLD AUTO: 9.4 10E9/L (ref 4–11)
WBC # BLD AUTO: 9.4 10E9/L (ref 4–11)
WBC # FLD AUTO: 2032 /UL
WBC # FLD AUTO: 2110 /UL
WBC CLUMPS #/AREA URNS HPF: PRESENT /HPF
WBC URINE: 125 /HPF

## 2021-01-01 PROCEDURE — 120N000002 HC R&B MED SURG/OB UMMC

## 2021-01-01 PROCEDURE — 82803 BLOOD GASES ANY COMBINATION: CPT

## 2021-01-01 PROCEDURE — 250N000013 HC RX MED GY IP 250 OP 250 PS 637: Performed by: STUDENT IN AN ORGANIZED HEALTH CARE EDUCATION/TRAINING PROGRAM

## 2021-01-01 PROCEDURE — 36415 COLL VENOUS BLD VENIPUNCTURE: CPT | Performed by: INTERNAL MEDICINE

## 2021-01-01 PROCEDURE — 99233 SBSQ HOSP IP/OBS HIGH 50: CPT | Mod: GC | Performed by: INTERNAL MEDICINE

## 2021-01-01 PROCEDURE — 78816 PET IMAGE W/CT FULL BODY: CPT | Mod: PI

## 2021-01-01 PROCEDURE — 87070 CULTURE OTHR SPECIMN AEROBIC: CPT | Performed by: STUDENT IN AN ORGANIZED HEALTH CARE EDUCATION/TRAINING PROGRAM

## 2021-01-01 PROCEDURE — 87102 FUNGUS ISOLATION CULTURE: CPT | Performed by: STUDENT IN AN ORGANIZED HEALTH CARE EDUCATION/TRAINING PROGRAM

## 2021-01-01 PROCEDURE — 999N000157 HC STATISTIC RCP TIME EA 10 MIN

## 2021-01-01 PROCEDURE — 999N001018 HC STATISTIC H-CELL BLOCK W/STAIN: Performed by: STUDENT IN AN ORGANIZED HEALTH CARE EDUCATION/TRAINING PROGRAM

## 2021-01-01 PROCEDURE — 83735 ASSAY OF MAGNESIUM: CPT | Performed by: INTERNAL MEDICINE

## 2021-01-01 PROCEDURE — 99204 OFFICE O/P NEW MOD 45 MIN: CPT | Performed by: FAMILY MEDICINE

## 2021-01-01 PROCEDURE — 99233 SBSQ HOSP IP/OBS HIGH 50: CPT | Mod: 25 | Performed by: INTERNAL MEDICINE

## 2021-01-01 PROCEDURE — 250N000013 HC RX MED GY IP 250 OP 250 PS 637: Performed by: INTERNAL MEDICINE

## 2021-01-01 PROCEDURE — 94640 AIRWAY INHALATION TREATMENT: CPT | Mod: 76

## 2021-01-01 PROCEDURE — 82330 ASSAY OF CALCIUM: CPT | Performed by: STUDENT IN AN ORGANIZED HEALTH CARE EDUCATION/TRAINING PROGRAM

## 2021-01-01 PROCEDURE — 85027 COMPLETE CBC AUTOMATED: CPT | Performed by: STUDENT IN AN ORGANIZED HEALTH CARE EDUCATION/TRAINING PROGRAM

## 2021-01-01 PROCEDURE — 97530 THERAPEUTIC ACTIVITIES: CPT | Mod: GO | Performed by: OCCUPATIONAL THERAPIST

## 2021-01-01 PROCEDURE — 99233 SBSQ HOSP IP/OBS HIGH 50: CPT | Performed by: INTERNAL MEDICINE

## 2021-01-01 PROCEDURE — 85652 RBC SED RATE AUTOMATED: CPT | Performed by: INTERNAL MEDICINE

## 2021-01-01 PROCEDURE — 93005 ELECTROCARDIOGRAM TRACING: CPT | Performed by: EMERGENCY MEDICINE

## 2021-01-01 PROCEDURE — 89051 BODY FLUID CELL COUNT: CPT | Performed by: STUDENT IN AN ORGANIZED HEALTH CARE EDUCATION/TRAINING PROGRAM

## 2021-01-01 PROCEDURE — 87040 BLOOD CULTURE FOR BACTERIA: CPT | Performed by: STUDENT IN AN ORGANIZED HEALTH CARE EDUCATION/TRAINING PROGRAM

## 2021-01-01 PROCEDURE — G0180 MD CERTIFICATION HHA PATIENT: HCPCS | Performed by: FAMILY MEDICINE

## 2021-01-01 PROCEDURE — 83605 ASSAY OF LACTIC ACID: CPT | Performed by: STUDENT IN AN ORGANIZED HEALTH CARE EDUCATION/TRAINING PROGRAM

## 2021-01-01 PROCEDURE — 99316 NF DSCHRG MGMT 30 MIN+: CPT | Performed by: NURSE PRACTITIONER

## 2021-01-01 PROCEDURE — 80061 LIPID PANEL: CPT | Performed by: FAMILY MEDICINE

## 2021-01-01 PROCEDURE — 86140 C-REACTIVE PROTEIN: CPT | Performed by: STUDENT IN AN ORGANIZED HEALTH CARE EDUCATION/TRAINING PROGRAM

## 2021-01-01 PROCEDURE — 97530 THERAPEUTIC ACTIVITIES: CPT | Mod: GP

## 2021-01-01 PROCEDURE — 250N000012 HC RX MED GY IP 250 OP 636 PS 637: Performed by: INTERNAL MEDICINE

## 2021-01-01 PROCEDURE — 83735 ASSAY OF MAGNESIUM: CPT | Performed by: STUDENT IN AN ORGANIZED HEALTH CARE EDUCATION/TRAINING PROGRAM

## 2021-01-01 PROCEDURE — 93000 ELECTROCARDIOGRAM COMPLETE: CPT | Performed by: FAMILY MEDICINE

## 2021-01-01 PROCEDURE — 250N000009 HC RX 250: Performed by: STUDENT IN AN ORGANIZED HEALTH CARE EDUCATION/TRAINING PROGRAM

## 2021-01-01 PROCEDURE — 0JH60XZ INSERTION OF TUNNELED VASCULAR ACCESS DEVICE INTO CHEST SUBCUTANEOUS TISSUE AND FASCIA, OPEN APPROACH: ICD-10-PCS | Performed by: INTERNAL MEDICINE

## 2021-01-01 PROCEDURE — 94002 VENT MGMT INPAT INIT DAY: CPT

## 2021-01-01 PROCEDURE — 83605 ASSAY OF LACTIC ACID: CPT

## 2021-01-01 PROCEDURE — 250N000011 HC RX IP 250 OP 636: Performed by: INTERNAL MEDICINE

## 2021-01-01 PROCEDURE — 78816 PET IMAGE W/CT FULL BODY: CPT | Mod: 26

## 2021-01-01 PROCEDURE — 36415 COLL VENOUS BLD VENIPUNCTURE: CPT | Performed by: STUDENT IN AN ORGANIZED HEALTH CARE EDUCATION/TRAINING PROGRAM

## 2021-01-01 PROCEDURE — 84145 PROCALCITONIN (PCT): CPT | Performed by: STUDENT IN AN ORGANIZED HEALTH CARE EDUCATION/TRAINING PROGRAM

## 2021-01-01 PROCEDURE — 82945 GLUCOSE OTHER FLUID: CPT | Performed by: STUDENT IN AN ORGANIZED HEALTH CARE EDUCATION/TRAINING PROGRAM

## 2021-01-01 PROCEDURE — 31500 INSERT EMERGENCY AIRWAY: CPT | Performed by: EMERGENCY MEDICINE

## 2021-01-01 PROCEDURE — 80048 BASIC METABOLIC PNL TOTAL CA: CPT | Performed by: STUDENT IN AN ORGANIZED HEALTH CARE EDUCATION/TRAINING PROGRAM

## 2021-01-01 PROCEDURE — 94640 AIRWAY INHALATION TREATMENT: CPT

## 2021-01-01 PROCEDURE — 71275 CT ANGIOGRAPHY CHEST: CPT

## 2021-01-01 PROCEDURE — 93321 DOPPLER ECHO F-UP/LMTD STD: CPT

## 2021-01-01 PROCEDURE — 88342 IMHCHEM/IMCYTCHM 1ST ANTB: CPT | Mod: 26 | Performed by: PATHOLOGY

## 2021-01-01 PROCEDURE — 71045 X-RAY EXAM CHEST 1 VIEW: CPT | Mod: 26 | Performed by: RADIOLOGY

## 2021-01-01 PROCEDURE — 32555 ASPIRATE PLEURA W/ IMAGING: CPT | Performed by: PEDIATRICS

## 2021-01-01 PROCEDURE — U0003 INFECTIOUS AGENT DETECTION BY NUCLEIC ACID (DNA OR RNA); SEVERE ACUTE RESPIRATORY SYNDROME CORONAVIRUS 2 (SARS-COV-2) (CORONAVIRUS DISEASE [COVID-19]), AMPLIFIED PROBE TECHNIQUE, MAKING USE OF HIGH THROUGHPUT TECHNOLOGIES AS DESCRIBED BY CMS-2020-01-R: HCPCS | Performed by: EMERGENCY MEDICINE

## 2021-01-01 PROCEDURE — 99309 SBSQ NF CARE MODERATE MDM 30: CPT | Performed by: NURSE PRACTITIONER

## 2021-01-01 PROCEDURE — 82040 ASSAY OF SERUM ALBUMIN: CPT | Performed by: EMERGENCY MEDICINE

## 2021-01-01 PROCEDURE — 71260 CT THORAX DX C+: CPT | Mod: 26 | Performed by: RADIOLOGY

## 2021-01-01 PROCEDURE — 250N000009 HC RX 250: Performed by: INTERNAL MEDICINE

## 2021-01-01 PROCEDURE — 85027 COMPLETE CBC AUTOMATED: CPT | Performed by: EMERGENCY MEDICINE

## 2021-01-01 PROCEDURE — 999N000065 XR CHEST PORT 1 VIEW

## 2021-01-01 PROCEDURE — A9552 F18 FDG: HCPCS | Performed by: INTERNAL MEDICINE

## 2021-01-01 PROCEDURE — 71260 CT THORAX DX C+: CPT

## 2021-01-01 PROCEDURE — 88112 CYTOPATH CELL ENHANCE TECH: CPT | Mod: 26 | Performed by: PATHOLOGY

## 2021-01-01 PROCEDURE — C9803 HOPD COVID-19 SPEC COLLECT: HCPCS

## 2021-01-01 PROCEDURE — 99292 CRITICAL CARE ADDL 30 MIN: CPT | Mod: 25 | Performed by: EMERGENCY MEDICINE

## 2021-01-01 PROCEDURE — 82803 BLOOD GASES ANY COMBINATION: CPT | Mod: 91 | Performed by: EMERGENCY MEDICINE

## 2021-01-01 PROCEDURE — 87186 SC STD MICRODIL/AGAR DIL: CPT | Performed by: EMERGENCY MEDICINE

## 2021-01-01 PROCEDURE — 999N000127 HC STATISTIC PERIPHERAL IV START W US GUIDANCE

## 2021-01-01 PROCEDURE — 71250 CT THORAX DX C-: CPT

## 2021-01-01 PROCEDURE — 99207 PR CDG-CUT & PASTE-POTENTIAL IMPACT ON LEVEL: CPT | Performed by: NURSE PRACTITIONER

## 2021-01-01 PROCEDURE — 71045 X-RAY EXAM CHEST 1 VIEW: CPT

## 2021-01-01 PROCEDURE — 36556 INSERT NON-TUNNEL CV CATH: CPT | Mod: 59 | Performed by: EMERGENCY MEDICINE

## 2021-01-01 PROCEDURE — 999N001014 HC STATISTIC CYTO WRIGHT STAIN TC: Performed by: STUDENT IN AN ORGANIZED HEALTH CARE EDUCATION/TRAINING PROGRAM

## 2021-01-01 PROCEDURE — U0003 INFECTIOUS AGENT DETECTION BY NUCLEIC ACID (DNA OR RNA); SEVERE ACUTE RESPIRATORY SYNDROME CORONAVIRUS 2 (SARS-COV-2) (CORONAVIRUS DISEASE [COVID-19]), AMPLIFIED PROBE TECHNIQUE, MAKING USE OF HIGH THROUGHPUT TECHNOLOGIES AS DESCRIBED BY CMS-2020-01-R: HCPCS | Performed by: INTERNAL MEDICINE

## 2021-01-01 PROCEDURE — 83615 LACTATE (LD) (LDH) ENZYME: CPT | Performed by: STUDENT IN AN ORGANIZED HEALTH CARE EDUCATION/TRAINING PROGRAM

## 2021-01-01 PROCEDURE — 87116 MYCOBACTERIA CULTURE: CPT | Performed by: STUDENT IN AN ORGANIZED HEALTH CARE EDUCATION/TRAINING PROGRAM

## 2021-01-01 PROCEDURE — 258N000003 HC RX IP 258 OP 636: Performed by: INTERNAL MEDICINE

## 2021-01-01 PROCEDURE — 250N000011 HC RX IP 250 OP 636: Performed by: STUDENT IN AN ORGANIZED HEALTH CARE EDUCATION/TRAINING PROGRAM

## 2021-01-01 PROCEDURE — 82533 TOTAL CORTISOL: CPT | Performed by: INTERNAL MEDICINE

## 2021-01-01 PROCEDURE — 76536 US EXAM OF HEAD AND NECK: CPT | Mod: 26 | Performed by: RADIOLOGY

## 2021-01-01 PROCEDURE — 96367 TX/PROPH/DG ADDL SEQ IV INF: CPT | Performed by: EMERGENCY MEDICINE

## 2021-01-01 PROCEDURE — 80048 BASIC METABOLIC PNL TOTAL CA: CPT | Performed by: FAMILY MEDICINE

## 2021-01-01 PROCEDURE — 99285 EMERGENCY DEPT VISIT HI MDM: CPT | Mod: 25

## 2021-01-01 PROCEDURE — 88341 IMHCHEM/IMCYTCHM EA ADD ANTB: CPT | Mod: TC | Performed by: STUDENT IN AN ORGANIZED HEALTH CARE EDUCATION/TRAINING PROGRAM

## 2021-01-01 PROCEDURE — 71046 X-RAY EXAM CHEST 2 VIEWS: CPT

## 2021-01-01 PROCEDURE — 81001 URINALYSIS AUTO W/SCOPE: CPT | Performed by: EMERGENCY MEDICINE

## 2021-01-01 PROCEDURE — 87015 SPECIMEN INFECT AGNT CONCNTJ: CPT | Performed by: STUDENT IN AN ORGANIZED HEALTH CARE EDUCATION/TRAINING PROGRAM

## 2021-01-01 PROCEDURE — 83970 ASSAY OF PARATHORMONE: CPT | Performed by: STUDENT IN AN ORGANIZED HEALTH CARE EDUCATION/TRAINING PROGRAM

## 2021-01-01 PROCEDURE — 999N000202 HC STATISTICAL VASC ACCESS NURSE TIME, 1-15 MINUTES

## 2021-01-01 PROCEDURE — 99366 TEAM CONF W/PAT BY HC PROF: CPT | Performed by: INTERNAL MEDICINE

## 2021-01-01 PROCEDURE — 96374 THER/PROPH/DIAG INJ IV PUSH: CPT

## 2021-01-01 PROCEDURE — 99223 1ST HOSP IP/OBS HIGH 75: CPT | Mod: AI | Performed by: INTERNAL MEDICINE

## 2021-01-01 PROCEDURE — 99239 HOSP IP/OBS DSCHRG MGMT >30: CPT | Mod: GC | Performed by: INTERNAL MEDICINE

## 2021-01-01 PROCEDURE — 86140 C-REACTIVE PROTEIN: CPT | Performed by: INTERNAL MEDICINE

## 2021-01-01 PROCEDURE — 71275 CT ANGIOGRAPHY CHEST: CPT | Mod: 26 | Performed by: RADIOLOGY

## 2021-01-01 PROCEDURE — U0005 INFEC AGEN DETEC AMPLI PROBE: HCPCS | Performed by: INTERNAL MEDICINE

## 2021-01-01 PROCEDURE — 999N001017 HC STATISTIC GLUCOSE BY METER IP

## 2021-01-01 PROCEDURE — 84155 ASSAY OF PROTEIN SERUM: CPT | Performed by: INTERNAL MEDICINE

## 2021-01-01 PROCEDURE — 80053 COMPREHEN METABOLIC PANEL: CPT | Performed by: FAMILY MEDICINE

## 2021-01-01 PROCEDURE — 84157 ASSAY OF PROTEIN OTHER: CPT | Performed by: STUDENT IN AN ORGANIZED HEALTH CARE EDUCATION/TRAINING PROGRAM

## 2021-01-01 PROCEDURE — 84132 ASSAY OF SERUM POTASSIUM: CPT | Performed by: INTERNAL MEDICINE

## 2021-01-01 PROCEDURE — 71250 CT THORAX DX C-: CPT | Mod: 26 | Performed by: RADIOLOGY

## 2021-01-01 PROCEDURE — 36415 COLL VENOUS BLD VENIPUNCTURE: CPT | Performed by: FAMILY MEDICINE

## 2021-01-01 PROCEDURE — 83880 ASSAY OF NATRIURETIC PEPTIDE: CPT | Performed by: EMERGENCY MEDICINE

## 2021-01-01 PROCEDURE — 99232 SBSQ HOSP IP/OBS MODERATE 35: CPT | Mod: GC | Performed by: INTERNAL MEDICINE

## 2021-01-01 PROCEDURE — 83935 ASSAY OF URINE OSMOLALITY: CPT | Performed by: STUDENT IN AN ORGANIZED HEALTH CARE EDUCATION/TRAINING PROGRAM

## 2021-01-01 PROCEDURE — 258N000003 HC RX IP 258 OP 636: Performed by: EMERGENCY MEDICINE

## 2021-01-01 PROCEDURE — 84155 ASSAY OF PROTEIN SERUM: CPT | Performed by: STUDENT IN AN ORGANIZED HEALTH CARE EDUCATION/TRAINING PROGRAM

## 2021-01-01 PROCEDURE — 80048 BASIC METABOLIC PNL TOTAL CA: CPT | Performed by: INTERNAL MEDICINE

## 2021-01-01 PROCEDURE — 88305 TISSUE EXAM BY PATHOLOGIST: CPT | Mod: 26 | Performed by: PATHOLOGY

## 2021-01-01 PROCEDURE — 84484 ASSAY OF TROPONIN QUANT: CPT | Performed by: EMERGENCY MEDICINE

## 2021-01-01 PROCEDURE — 85027 COMPLETE CBC AUTOMATED: CPT | Performed by: INTERNAL MEDICINE

## 2021-01-01 PROCEDURE — 88305 TISSUE EXAM BY PATHOLOGIST: CPT | Mod: TC | Performed by: STUDENT IN AN ORGANIZED HEALTH CARE EDUCATION/TRAINING PROGRAM

## 2021-01-01 PROCEDURE — 83930 ASSAY OF BLOOD OSMOLALITY: CPT | Performed by: STUDENT IN AN ORGANIZED HEALTH CARE EDUCATION/TRAINING PROGRAM

## 2021-01-01 PROCEDURE — 99153 MOD SED SAME PHYS/QHP EA: CPT | Performed by: INTERNAL MEDICINE

## 2021-01-01 PROCEDURE — 88112 CYTOPATH CELL ENHANCE TECH: CPT | Mod: TC | Performed by: STUDENT IN AN ORGANIZED HEALTH CARE EDUCATION/TRAINING PROGRAM

## 2021-01-01 PROCEDURE — 84100 ASSAY OF PHOSPHORUS: CPT | Performed by: STUDENT IN AN ORGANIZED HEALTH CARE EDUCATION/TRAINING PROGRAM

## 2021-01-01 PROCEDURE — 97165 OT EVAL LOW COMPLEX 30 MIN: CPT | Mod: GO | Performed by: OCCUPATIONAL THERAPIST

## 2021-01-01 PROCEDURE — 96368 THER/DIAG CONCURRENT INF: CPT | Performed by: EMERGENCY MEDICINE

## 2021-01-01 PROCEDURE — 97535 SELF CARE MNGMENT TRAINING: CPT | Mod: GO | Performed by: OCCUPATIONAL THERAPIST

## 2021-01-01 PROCEDURE — 85027 COMPLETE CBC AUTOMATED: CPT | Performed by: FAMILY MEDICINE

## 2021-01-01 PROCEDURE — 99223 1ST HOSP IP/OBS HIGH 75: CPT | Performed by: INTERNAL MEDICINE

## 2021-01-01 PROCEDURE — 87075 CULTR BACTERIA EXCEPT BLOOD: CPT | Performed by: STUDENT IN AN ORGANIZED HEALTH CARE EDUCATION/TRAINING PROGRAM

## 2021-01-01 PROCEDURE — G0500 MOD SEDAT ENDO SERVICE >5YRS: HCPCS | Performed by: INTERNAL MEDICINE

## 2021-01-01 PROCEDURE — 99285 EMERGENCY DEPT VISIT HI MDM: CPT | Mod: 25 | Performed by: EMERGENCY MEDICINE

## 2021-01-01 PROCEDURE — 87205 SMEAR GRAM STAIN: CPT | Performed by: STUDENT IN AN ORGANIZED HEALTH CARE EDUCATION/TRAINING PROGRAM

## 2021-01-01 PROCEDURE — 92950 HEART/LUNG RESUSCITATION CPR: CPT | Performed by: EMERGENCY MEDICINE

## 2021-01-01 PROCEDURE — 85610 PROTHROMBIN TIME: CPT | Performed by: INTERNAL MEDICINE

## 2021-01-01 PROCEDURE — 97110 THERAPEUTIC EXERCISES: CPT | Mod: GP

## 2021-01-01 PROCEDURE — 96366 THER/PROPH/DIAG IV INF ADDON: CPT | Performed by: EMERGENCY MEDICINE

## 2021-01-01 PROCEDURE — 70491 CT SOFT TISSUE NECK W/DYE: CPT | Mod: 26

## 2021-01-01 PROCEDURE — 71260 CT THORAX DX C+: CPT | Mod: 26

## 2021-01-01 PROCEDURE — 32550 INSERT PLEURAL CATH: CPT | Performed by: INTERNAL MEDICINE

## 2021-01-01 PROCEDURE — 87206 SMEAR FLUORESCENT/ACID STAI: CPT | Performed by: STUDENT IN AN ORGANIZED HEALTH CARE EDUCATION/TRAINING PROGRAM

## 2021-01-01 PROCEDURE — 71046 X-RAY EXAM CHEST 2 VIEWS: CPT | Mod: 26 | Performed by: RADIOLOGY

## 2021-01-01 PROCEDURE — 84443 ASSAY THYROID STIM HORMONE: CPT | Performed by: STUDENT IN AN ORGANIZED HEALTH CARE EDUCATION/TRAINING PROGRAM

## 2021-01-01 PROCEDURE — 36415 COLL VENOUS BLD VENIPUNCTURE: CPT

## 2021-01-01 PROCEDURE — 93325 DOPPLER ECHO COLOR FLOW MAPG: CPT | Mod: 26 | Performed by: INTERNAL MEDICINE

## 2021-01-01 PROCEDURE — 99310 SBSQ NF CARE HIGH MDM 45: CPT | Performed by: NURSE PRACTITIONER

## 2021-01-01 PROCEDURE — 76536 US EXAM OF HEAD AND NECK: CPT

## 2021-01-01 PROCEDURE — 84484 ASSAY OF TROPONIN QUANT: CPT | Performed by: INTERNAL MEDICINE

## 2021-01-01 PROCEDURE — 71046 X-RAY EXAM CHEST 2 VIEWS: CPT | Mod: FY | Performed by: RADIOLOGY

## 2021-01-01 PROCEDURE — 250N000013 HC RX MED GY IP 250 OP 250 PS 637

## 2021-01-01 PROCEDURE — 87086 URINE CULTURE/COLONY COUNT: CPT | Performed by: EMERGENCY MEDICINE

## 2021-01-01 PROCEDURE — 999N000128 HC STATISTIC PERIPHERAL IV START W/O US GUIDANCE

## 2021-01-01 PROCEDURE — 88341 IMHCHEM/IMCYTCHM EA ADD ANTB: CPT | Mod: 26 | Performed by: PATHOLOGY

## 2021-01-01 PROCEDURE — 88342 IMHCHEM/IMCYTCHM 1ST ANTB: CPT | Mod: TC | Performed by: STUDENT IN AN ORGANIZED HEALTH CARE EDUCATION/TRAINING PROGRAM

## 2021-01-01 PROCEDURE — 93005 ELECTROCARDIOGRAM TRACING: CPT

## 2021-01-01 PROCEDURE — 82533 TOTAL CORTISOL: CPT | Performed by: STUDENT IN AN ORGANIZED HEALTH CARE EDUCATION/TRAINING PROGRAM

## 2021-01-01 PROCEDURE — 83986 ASSAY PH BODY FLUID NOS: CPT | Performed by: STUDENT IN AN ORGANIZED HEALTH CARE EDUCATION/TRAINING PROGRAM

## 2021-01-01 PROCEDURE — 83615 LACTATE (LD) (LDH) ENZYME: CPT | Performed by: INTERNAL MEDICINE

## 2021-01-01 PROCEDURE — 74177 CT ABD & PELVIS W/CONTRAST: CPT | Mod: 26

## 2021-01-01 PROCEDURE — 84300 ASSAY OF URINE SODIUM: CPT | Performed by: STUDENT IN AN ORGANIZED HEALTH CARE EDUCATION/TRAINING PROGRAM

## 2021-01-01 PROCEDURE — 85025 COMPLETE CBC W/AUTO DIFF WBC: CPT | Performed by: INTERNAL MEDICINE

## 2021-01-01 PROCEDURE — 99285 EMERGENCY DEPT VISIT HI MDM: CPT | Mod: 25 | Performed by: INTERNAL MEDICINE

## 2021-01-01 PROCEDURE — 83605 ASSAY OF LACTIC ACID: CPT | Performed by: INTERNAL MEDICINE

## 2021-01-01 PROCEDURE — 250N000011 HC RX IP 250 OP 636: Performed by: EMERGENCY MEDICINE

## 2021-01-01 PROCEDURE — 92950 HEART/LUNG RESUSCITATION CPR: CPT | Mod: 59 | Performed by: EMERGENCY MEDICINE

## 2021-01-01 PROCEDURE — 70491 CT SOFT TISSUE NECK W/DYE: CPT

## 2021-01-01 PROCEDURE — 83735 ASSAY OF MAGNESIUM: CPT | Performed by: EMERGENCY MEDICINE

## 2021-01-01 PROCEDURE — 999N000015 HC STATISTIC ARTERIAL MONITORING DAILY

## 2021-01-01 PROCEDURE — 36415 COLL VENOUS BLD VENIPUNCTURE: CPT | Performed by: EMERGENCY MEDICINE

## 2021-01-01 PROCEDURE — 93321 DOPPLER ECHO F-UP/LMTD STD: CPT | Mod: 26 | Performed by: INTERNAL MEDICINE

## 2021-01-01 PROCEDURE — 99215 OFFICE O/P EST HI 40 MIN: CPT | Performed by: FAMILY MEDICINE

## 2021-01-01 PROCEDURE — 343N000001 HC RX 343: Performed by: INTERNAL MEDICINE

## 2021-01-01 PROCEDURE — C9803 HOPD COVID-19 SPEC COLLECT: HCPCS | Performed by: EMERGENCY MEDICINE

## 2021-01-01 PROCEDURE — 0W993ZZ DRAINAGE OF RIGHT PLEURAL CAVITY, PERCUTANEOUS APPROACH: ICD-10-PCS | Performed by: PEDIATRICS

## 2021-01-01 PROCEDURE — 87181 SC STD AGAR DILUTION PER AGT: CPT | Performed by: STUDENT IN AN ORGANIZED HEALTH CARE EDUCATION/TRAINING PROGRAM

## 2021-01-01 PROCEDURE — 32555 ASPIRATE PLEURA W/ IMAGING: CPT | Performed by: INTERNAL MEDICINE

## 2021-01-01 PROCEDURE — 93010 ELECTROCARDIOGRAM REPORT: CPT | Performed by: INTERNAL MEDICINE

## 2021-01-01 PROCEDURE — 0031A PR COVID VAC JANSSEN AD26 0.5ML: CPT

## 2021-01-01 PROCEDURE — 87077 CULTURE AEROBIC IDENTIFY: CPT | Performed by: STUDENT IN AN ORGANIZED HEALTH CARE EDUCATION/TRAINING PROGRAM

## 2021-01-01 PROCEDURE — 99291 CRITICAL CARE FIRST HOUR: CPT | Mod: 25 | Performed by: EMERGENCY MEDICINE

## 2021-01-01 PROCEDURE — 83880 ASSAY OF NATRIURETIC PEPTIDE: CPT | Performed by: INTERNAL MEDICINE

## 2021-01-01 PROCEDURE — 97542 WHEELCHAIR MNGMENT TRAINING: CPT | Mod: GO | Performed by: OCCUPATIONAL THERAPIST

## 2021-01-01 PROCEDURE — 99223 1ST HOSP IP/OBS HIGH 75: CPT | Mod: GC | Performed by: INTERNAL MEDICINE

## 2021-01-01 PROCEDURE — 93010 ELECTROCARDIOGRAM REPORT: CPT | Mod: 59 | Performed by: EMERGENCY MEDICINE

## 2021-01-01 PROCEDURE — 96365 THER/PROPH/DIAG IV INF INIT: CPT | Performed by: EMERGENCY MEDICINE

## 2021-01-01 PROCEDURE — 36556 INSERT NON-TUNNEL CV CATH: CPT | Performed by: EMERGENCY MEDICINE

## 2021-01-01 PROCEDURE — 99356 PR PROLONGED SERV,INPATIENT,1ST HR: CPT | Performed by: INTERNAL MEDICINE

## 2021-01-01 PROCEDURE — 99305 1ST NF CARE MODERATE MDM 35: CPT | Performed by: FAMILY MEDICINE

## 2021-01-01 PROCEDURE — 86481 TB AG RESPONSE T-CELL SUSP: CPT | Performed by: STUDENT IN AN ORGANIZED HEALTH CARE EDUCATION/TRAINING PROGRAM

## 2021-01-01 PROCEDURE — 97161 PT EVAL LOW COMPLEX 20 MIN: CPT | Mod: GP

## 2021-01-01 PROCEDURE — 250N000009 HC RX 250: Performed by: EMERGENCY MEDICINE

## 2021-01-01 PROCEDURE — 99291 CRITICAL CARE FIRST HOUR: CPT | Performed by: INTERNAL MEDICINE

## 2021-01-01 PROCEDURE — 80053 COMPREHEN METABOLIC PANEL: CPT | Performed by: INTERNAL MEDICINE

## 2021-01-01 PROCEDURE — 71045 X-RAY EXAM CHEST 1 VIEW: CPT | Mod: 26 | Performed by: STUDENT IN AN ORGANIZED HEALTH CARE EDUCATION/TRAINING PROGRAM

## 2021-01-01 PROCEDURE — 83970 ASSAY OF PARATHORMONE: CPT | Performed by: INTERNAL MEDICINE

## 2021-01-01 PROCEDURE — 91303 PR COVID VAC JANSSEN AD26 0.5ML: CPT

## 2021-01-01 PROCEDURE — 96375 TX/PRO/DX INJ NEW DRUG ADDON: CPT | Performed by: EMERGENCY MEDICINE

## 2021-01-01 PROCEDURE — 93308 TTE F-UP OR LMTD: CPT | Mod: 26 | Performed by: INTERNAL MEDICINE

## 2021-01-01 PROCEDURE — 96375 TX/PRO/DX INJ NEW DRUG ADDON: CPT

## 2021-01-01 PROCEDURE — 32551 INSERTION OF CHEST TUBE: CPT | Performed by: INTERNAL MEDICINE

## 2021-01-01 RX ORDER — GLYCOPYRROLATE 0.2 MG/ML
0.1 INJECTION, SOLUTION INTRAMUSCULAR; INTRAVENOUS EVERY 4 HOURS PRN
Status: DISCONTINUED | OUTPATIENT
Start: 2021-01-01 | End: 2021-01-01 | Stop reason: HOSPADM

## 2021-01-01 RX ORDER — LOSARTAN POTASSIUM 100 MG/1
100 TABLET ORAL DAILY
Qty: 90 TABLET | Refills: 0 | Status: SHIPPED | OUTPATIENT
Start: 2021-01-01

## 2021-01-01 RX ORDER — METHYLPREDNISOLONE SODIUM SUCCINATE 125 MG/2ML
125 INJECTION, POWDER, LYOPHILIZED, FOR SOLUTION INTRAMUSCULAR; INTRAVENOUS ONCE
Status: COMPLETED | OUTPATIENT
Start: 2021-01-01 | End: 2021-01-01

## 2021-01-01 RX ORDER — DIPHENHYDRAMINE HCL 25 MG
25 CAPSULE ORAL
Qty: 60 CAPSULE | Refills: 0 | Status: SHIPPED | OUTPATIENT
Start: 2021-01-01 | End: 2021-01-01

## 2021-01-01 RX ORDER — PANTOPRAZOLE SODIUM 40 MG/1
40 TABLET, DELAYED RELEASE ORAL
Status: DISCONTINUED | OUTPATIENT
Start: 2021-01-01 | End: 2021-01-01

## 2021-01-01 RX ORDER — IPRATROPIUM BROMIDE AND ALBUTEROL SULFATE 2.5; .5 MG/3ML; MG/3ML
3 SOLUTION RESPIRATORY (INHALATION) ONCE
Status: COMPLETED | OUTPATIENT
Start: 2021-01-01 | End: 2021-01-01

## 2021-01-01 RX ORDER — CLOPIDOGREL BISULFATE 75 MG/1
TABLET ORAL
Qty: 90 TABLET | Refills: 0 | Status: SHIPPED | OUTPATIENT
Start: 2021-01-01

## 2021-01-01 RX ORDER — POLYETHYLENE GLYCOL 3350 17 G/17G
17 POWDER, FOR SOLUTION ORAL DAILY PRN
Status: DISCONTINUED | OUTPATIENT
Start: 2021-01-01 | End: 2021-01-01 | Stop reason: HOSPADM

## 2021-01-01 RX ORDER — LIDOCAINE 4 G/G
1 PATCH TOPICAL DAILY PRN
Start: 2021-01-01 | End: 2021-01-01

## 2021-01-01 RX ORDER — GUAIFENESIN 200 MG/10ML
200 LIQUID ORAL 2 TIMES DAILY
Qty: 1000 ML | Refills: 0 | Status: SHIPPED | OUTPATIENT
Start: 2021-01-01

## 2021-01-01 RX ORDER — AMLODIPINE BESYLATE 10 MG/1
10 TABLET ORAL DAILY
Status: DISCONTINUED | OUTPATIENT
Start: 2021-01-01 | End: 2021-01-01 | Stop reason: HOSPADM

## 2021-01-01 RX ORDER — FUROSEMIDE 10 MG/ML
40 INJECTION INTRAMUSCULAR; INTRAVENOUS ONCE
Status: COMPLETED | OUTPATIENT
Start: 2021-01-01 | End: 2021-01-01

## 2021-01-01 RX ORDER — OXYCODONE HYDROCHLORIDE 5 MG/1
2.5-5 TABLET ORAL EVERY 6 HOURS PRN
Qty: 8 TABLET | Refills: 0 | Status: SHIPPED | OUTPATIENT
Start: 2021-01-01 | End: 2021-01-01

## 2021-01-01 RX ORDER — IOPAMIDOL 755 MG/ML
72 INJECTION, SOLUTION INTRAVASCULAR ONCE
Status: DISCONTINUED | OUTPATIENT
Start: 2021-01-01 | End: 2021-01-01 | Stop reason: CLARIF

## 2021-01-01 RX ORDER — NALOXONE HYDROCHLORIDE 0.4 MG/ML
0.4 INJECTION, SOLUTION INTRAMUSCULAR; INTRAVENOUS; SUBCUTANEOUS
Status: DISCONTINUED | OUTPATIENT
Start: 2021-01-01 | End: 2021-01-01 | Stop reason: HOSPADM

## 2021-01-01 RX ORDER — CAMPHOR, MENTHOL, METHYL SALICYLATE 21.56; 41.73; 69.55 MG/1; MG/1; MG/1
2 PATCH PERCUTANEOUS; TOPICAL; TRANSDERMAL DAILY
Start: 2021-01-01

## 2021-01-01 RX ORDER — PROPOFOL 10 MG/ML
5-75 INJECTION, EMULSION INTRAVENOUS CONTINUOUS
Status: DISCONTINUED | OUTPATIENT
Start: 2021-01-01 | End: 2021-01-01 | Stop reason: HOSPADM

## 2021-01-01 RX ORDER — VITAMIN B COMPLEX
50 TABLET ORAL DAILY
Status: DISCONTINUED | OUTPATIENT
Start: 2021-01-01 | End: 2021-01-01 | Stop reason: HOSPADM

## 2021-01-01 RX ORDER — NAPROXEN 250 MG/1
250 TABLET ORAL
Status: COMPLETED | OUTPATIENT
Start: 2021-01-01 | End: 2021-01-01

## 2021-01-01 RX ORDER — LOSARTAN POTASSIUM 100 MG/1
100 TABLET ORAL DAILY
Status: DISCONTINUED | OUTPATIENT
Start: 2021-01-01 | End: 2021-01-01 | Stop reason: HOSPADM

## 2021-01-01 RX ORDER — DIPHENHYDRAMINE HCL 25 MG
50 CAPSULE ORAL ONCE
Status: COMPLETED | OUTPATIENT
Start: 2021-01-01 | End: 2021-01-01

## 2021-01-01 RX ORDER — FENTANYL CITRATE 50 UG/ML
50 INJECTION, SOLUTION INTRAMUSCULAR; INTRAVENOUS EVERY 10 MIN PRN
Status: DISCONTINUED | OUTPATIENT
Start: 2021-01-01 | End: 2021-01-01 | Stop reason: HOSPADM

## 2021-01-01 RX ORDER — AMLODIPINE BESYLATE 10 MG/1
5 TABLET ORAL DAILY
Qty: 45 TABLET | Refills: 0 | Status: SHIPPED | OUTPATIENT
Start: 2021-01-01

## 2021-01-01 RX ORDER — DEXTROSE MONOHYDRATE 25 G/50ML
25-50 INJECTION, SOLUTION INTRAVENOUS
Status: DISCONTINUED | OUTPATIENT
Start: 2021-01-01 | End: 2021-01-01 | Stop reason: HOSPADM

## 2021-01-01 RX ORDER — LIDOCAINE 40 MG/G
CREAM TOPICAL
Status: DISCONTINUED | OUTPATIENT
Start: 2021-01-01 | End: 2021-01-01 | Stop reason: HOSPADM

## 2021-01-01 RX ORDER — NALOXONE HYDROCHLORIDE 0.4 MG/ML
0.2 INJECTION, SOLUTION INTRAMUSCULAR; INTRAVENOUS; SUBCUTANEOUS
Status: DISCONTINUED | OUTPATIENT
Start: 2021-01-01 | End: 2021-01-01 | Stop reason: HOSPADM

## 2021-01-01 RX ORDER — ROSUVASTATIN CALCIUM 5 MG/1
TABLET, COATED ORAL
Qty: 45 TABLET | Refills: 3 | Status: SHIPPED | OUTPATIENT
Start: 2021-01-01 | End: 2021-01-01

## 2021-01-01 RX ORDER — NAPROXEN SODIUM 220 MG
220 TABLET ORAL ONCE
Status: DISCONTINUED | OUTPATIENT
Start: 2021-01-01 | End: 2021-01-01

## 2021-01-01 RX ORDER — ALBUTEROL SULFATE 90 UG/1
2 AEROSOL, METERED RESPIRATORY (INHALATION) EVERY 6 HOURS SCHEDULED
Status: DISCONTINUED | OUTPATIENT
Start: 2021-01-01 | End: 2021-01-01 | Stop reason: HOSPADM

## 2021-01-01 RX ORDER — ALBUTEROL SULFATE 90 UG/1
2 AEROSOL, METERED RESPIRATORY (INHALATION) EVERY 6 HOURS
Qty: 6.7 G | Refills: 0 | Status: SHIPPED | OUTPATIENT
Start: 2021-01-01

## 2021-01-01 RX ORDER — AMLODIPINE BESYLATE 10 MG/1
10 TABLET ORAL DAILY
Qty: 90 TABLET | Refills: 1 | Status: SHIPPED | OUTPATIENT
Start: 2021-01-01 | End: 2021-01-01

## 2021-01-01 RX ORDER — FUROSEMIDE 20 MG
10 TABLET ORAL DAILY
Qty: 15 TABLET | Refills: 1 | Status: ON HOLD | OUTPATIENT
Start: 2021-01-01 | End: 2021-01-01

## 2021-01-01 RX ORDER — LEVOFLOXACIN 5 MG/ML
750 INJECTION, SOLUTION INTRAVENOUS EVERY 24 HOURS
Qty: 4 EACH | Refills: 0 | Status: CANCELLED | OUTPATIENT
Start: 2021-01-01 | End: 2021-01-01

## 2021-01-01 RX ORDER — CARVEDILOL 3.12 MG/1
6.25 TABLET ORAL 2 TIMES DAILY WITH MEALS
Status: DISCONTINUED | OUTPATIENT
Start: 2021-01-01 | End: 2021-01-01

## 2021-01-01 RX ORDER — LEVOFLOXACIN 250 MG/1
750 TABLET, FILM COATED ORAL DAILY
Status: COMPLETED | OUTPATIENT
Start: 2021-01-01 | End: 2021-01-01

## 2021-01-01 RX ORDER — ACETAMINOPHEN 325 MG/1
975 TABLET ORAL ONCE
Status: COMPLETED | OUTPATIENT
Start: 2021-01-01 | End: 2021-01-01

## 2021-01-01 RX ORDER — ALBUTEROL SULFATE 90 UG/1
2 AEROSOL, METERED RESPIRATORY (INHALATION) EVERY 6 HOURS
Qty: 8 G | Refills: 3 | Status: SHIPPED | OUTPATIENT
Start: 2021-01-01 | End: 2021-01-01

## 2021-01-01 RX ORDER — FENTANYL CITRATE 50 UG/ML
50 INJECTION, SOLUTION INTRAMUSCULAR; INTRAVENOUS EVERY 30 MIN PRN
Status: DISCONTINUED | OUTPATIENT
Start: 2021-01-01 | End: 2021-01-01 | Stop reason: HOSPADM

## 2021-01-01 RX ORDER — OXYCODONE HYDROCHLORIDE 5 MG/1
2.5-5 TABLET ORAL EVERY 6 HOURS PRN
Qty: 60 TABLET | Refills: 0 | Status: SHIPPED | OUTPATIENT
Start: 2021-01-01 | End: 2021-01-01

## 2021-01-01 RX ORDER — IOPAMIDOL 755 MG/ML
40-135 INJECTION, SOLUTION INTRAVASCULAR ONCE
Status: COMPLETED | OUTPATIENT
Start: 2021-01-01 | End: 2021-01-01

## 2021-01-01 RX ORDER — CAMPHOR, MENTHOL, METHYL SALICYLATE 21.56; 41.73; 69.55 MG/1; MG/1; MG/1
1 PATCH PERCUTANEOUS; TOPICAL; TRANSDERMAL DAILY PRN
COMMUNITY
End: 2021-01-01

## 2021-01-01 RX ORDER — ASCORBIC ACID 500 MG
500 TABLET ORAL DAILY
Status: DISCONTINUED | OUTPATIENT
Start: 2021-01-01 | End: 2021-01-01 | Stop reason: HOSPADM

## 2021-01-01 RX ORDER — LEVOFLOXACIN 5 MG/ML
750 INJECTION, SOLUTION INTRAVENOUS EVERY 24 HOURS
Status: DISCONTINUED | OUTPATIENT
Start: 2021-01-01 | End: 2021-01-01

## 2021-01-01 RX ORDER — LOSARTAN POTASSIUM 100 MG/1
100 TABLET ORAL DAILY
Qty: 90 TABLET | Refills: 1 | Status: SHIPPED | OUTPATIENT
Start: 2021-01-01 | End: 2021-01-01

## 2021-01-01 RX ORDER — MULTIVIT-MIN/IRON/FOLIC ACID/K 18-600-40
1 CAPSULE ORAL DAILY
COMMUNITY
End: 2021-01-01

## 2021-01-01 RX ORDER — POLYETHYLENE GLYCOL 3350 17 G/17G
1 POWDER, FOR SOLUTION ORAL DAILY PRN
Qty: 30 PACKET | Refills: 3 | Status: SHIPPED | OUTPATIENT
Start: 2021-01-01

## 2021-01-01 RX ORDER — AMLODIPINE BESYLATE 10 MG/1
5 TABLET ORAL DAILY
Qty: 90 TABLET | Refills: 0 | Status: SHIPPED | OUTPATIENT
Start: 2021-01-01 | End: 2021-01-01

## 2021-01-01 RX ORDER — ACETAMINOPHEN 500 MG
1000 TABLET ORAL 2 TIMES DAILY
Qty: 240 TABLET | Refills: 0 | Status: SHIPPED | OUTPATIENT
Start: 2021-01-01 | End: 2021-01-01

## 2021-01-01 RX ORDER — IOPAMIDOL 755 MG/ML
100 INJECTION, SOLUTION INTRAVASCULAR ONCE
Status: COMPLETED | OUTPATIENT
Start: 2021-01-01 | End: 2021-01-01

## 2021-01-01 RX ORDER — OXYCODONE HYDROCHLORIDE 5 MG/1
2.5-5 TABLET ORAL EVERY 6 HOURS PRN
Qty: 8 TABLET | Refills: 0 | Status: SHIPPED | DISCHARGE
Start: 2021-01-01 | End: 2021-01-01

## 2021-01-01 RX ORDER — FENTANYL CITRATE 50 UG/ML
INJECTION, SOLUTION INTRAMUSCULAR; INTRAVENOUS PRN
Status: COMPLETED | OUTPATIENT
Start: 2021-01-01 | End: 2021-01-01

## 2021-01-01 RX ORDER — LIDOCAINE 4 G/G
1 PATCH TOPICAL
Status: DISCONTINUED | OUTPATIENT
Start: 2021-01-01 | End: 2021-01-01 | Stop reason: HOSPADM

## 2021-01-01 RX ORDER — ROSUVASTATIN CALCIUM 5 MG/1
5 TABLET, COATED ORAL AT BEDTIME
Status: DISCONTINUED | OUTPATIENT
Start: 2021-01-01 | End: 2021-01-01

## 2021-01-01 RX ORDER — IPRATROPIUM BROMIDE AND ALBUTEROL SULFATE 2.5; .5 MG/3ML; MG/3ML
3 SOLUTION RESPIRATORY (INHALATION)
Status: DISCONTINUED | OUTPATIENT
Start: 2021-01-01 | End: 2021-01-01

## 2021-01-01 RX ORDER — ACETAMINOPHEN 500 MG
1000 TABLET ORAL 2 TIMES DAILY
Qty: 240 TABLET | Refills: 0 | Status: SHIPPED | OUTPATIENT
Start: 2021-01-01

## 2021-01-01 RX ORDER — NICOTINE POLACRILEX 4 MG
15-30 LOZENGE BUCCAL
Status: DISCONTINUED | OUTPATIENT
Start: 2021-01-01 | End: 2021-01-01 | Stop reason: HOSPADM

## 2021-01-01 RX ORDER — ACETAMINOPHEN 500 MG
1000 TABLET ORAL EVERY 6 HOURS PRN
Qty: 240 TABLET | Refills: 0 | Status: SHIPPED | OUTPATIENT
Start: 2021-01-01 | End: 2021-01-01

## 2021-01-01 RX ORDER — PREDNISONE 20 MG/1
40 TABLET ORAL DAILY
Qty: 8 TABLET | Refills: 0 | Status: CANCELLED | OUTPATIENT
Start: 2021-01-01 | End: 2021-01-01

## 2021-01-01 RX ORDER — CLOPIDOGREL BISULFATE 75 MG/1
75 TABLET ORAL DAILY
Status: DISCONTINUED | OUTPATIENT
Start: 2021-01-01 | End: 2021-01-01 | Stop reason: HOSPADM

## 2021-01-01 RX ORDER — PANTOPRAZOLE SODIUM 40 MG/1
40 TABLET, DELAYED RELEASE ORAL
Qty: 4 TABLET | Refills: 0 | Status: CANCELLED | OUTPATIENT
Start: 2021-01-01 | End: 2021-01-01

## 2021-01-01 RX ORDER — OXYCODONE HYDROCHLORIDE 5 MG/1
5 TABLET ORAL EVERY 6 HOURS PRN
Qty: 60 TABLET | Refills: 0 | Status: SHIPPED | OUTPATIENT
Start: 2021-01-01 | End: 2021-01-01

## 2021-01-01 RX ORDER — HYDRALAZINE HYDROCHLORIDE 25 MG/1
25 TABLET, FILM COATED ORAL DAILY
Qty: 90 TABLET | Refills: 0 | Status: SHIPPED | OUTPATIENT
Start: 2021-01-01

## 2021-01-01 RX ORDER — DIPHENHYDRAMINE HCL 25 MG
25 CAPSULE ORAL ONCE
Status: DISCONTINUED | OUTPATIENT
Start: 2021-01-01 | End: 2021-01-01

## 2021-01-01 RX ORDER — HYDRALAZINE HYDROCHLORIDE 25 MG/1
TABLET, FILM COATED ORAL
Qty: 135 TABLET | Refills: 1 | Status: SHIPPED | OUTPATIENT
Start: 2021-01-01 | End: 2021-01-01

## 2021-01-01 RX ORDER — ROSUVASTATIN CALCIUM 5 MG/1
TABLET, COATED ORAL
Qty: 45 TABLET | Refills: 3
Start: 2021-01-01 | End: 2021-01-01

## 2021-01-01 RX ORDER — OXYCODONE HYDROCHLORIDE 5 MG/1
5 TABLET ORAL EVERY 6 HOURS PRN
Qty: 30 TABLET | Refills: 0 | Status: SHIPPED | OUTPATIENT
Start: 2021-01-01

## 2021-01-01 RX ORDER — IOPAMIDOL 755 MG/ML
73 INJECTION, SOLUTION INTRAVASCULAR ONCE
Status: COMPLETED | OUTPATIENT
Start: 2021-01-01 | End: 2021-01-01

## 2021-01-01 RX ORDER — CLOPIDOGREL BISULFATE 75 MG/1
TABLET ORAL
Qty: 90 TABLET | Refills: 1 | Status: SHIPPED | OUTPATIENT
Start: 2021-01-01 | End: 2021-01-01

## 2021-01-01 RX ORDER — ROSUVASTATIN CALCIUM 5 MG/1
5 TABLET, COATED ORAL
Status: DISCONTINUED | OUTPATIENT
Start: 2021-01-01 | End: 2021-01-01 | Stop reason: HOSPADM

## 2021-01-01 RX ORDER — COSYNTROPIN 0.25 MG/ML
250 INJECTION, POWDER, FOR SOLUTION INTRAMUSCULAR; INTRAVENOUS ONCE
Status: COMPLETED | OUTPATIENT
Start: 2021-01-01 | End: 2021-01-01

## 2021-01-01 RX ORDER — NOREPINEPHRINE BITARTRATE 0.06 MG/ML
.01-.6 INJECTION, SOLUTION INTRAVENOUS CONTINUOUS
Status: DISCONTINUED | OUTPATIENT
Start: 2021-01-01 | End: 2021-01-01 | Stop reason: HOSPADM

## 2021-01-01 RX ORDER — PREDNISONE 20 MG/1
40 TABLET ORAL DAILY
Status: COMPLETED | OUTPATIENT
Start: 2021-01-01 | End: 2021-01-01

## 2021-01-01 RX ORDER — HYDRALAZINE HYDROCHLORIDE 25 MG/1
25 TABLET, FILM COATED ORAL DAILY
Qty: 60 TABLET | Refills: 0 | Status: SHIPPED | OUTPATIENT
Start: 2021-01-01 | End: 2021-01-01

## 2021-01-01 RX ADMIN — CLOPIDOGREL BISULFATE 75 MG: 75 TABLET ORAL at 07:57

## 2021-01-01 RX ADMIN — Medication 37.5 MG: at 19:27

## 2021-01-01 RX ADMIN — CARVEDILOL 6.25 MG: 3.12 TABLET, FILM COATED ORAL at 17:51

## 2021-01-01 RX ADMIN — Medication 37.5 MG: at 08:08

## 2021-01-01 RX ADMIN — SODIUM CHLORIDE 500 ML: 9 INJECTION, SOLUTION INTRAVENOUS at 12:22

## 2021-01-01 RX ADMIN — AMLODIPINE BESYLATE 10 MG: 10 TABLET ORAL at 08:04

## 2021-01-01 RX ADMIN — ALBUTEROL SULFATE 2 PUFF: 90 AEROSOL, METERED RESPIRATORY (INHALATION) at 08:05

## 2021-01-01 RX ADMIN — Medication 50 MCG: at 13:43

## 2021-01-01 RX ADMIN — FLUDEOXYGLUCOSE F-18 11.55 MCI.: 500 INJECTION, SOLUTION INTRAVENOUS at 10:55

## 2021-01-01 RX ADMIN — PREDNISONE 40 MG: 20 TABLET ORAL at 08:39

## 2021-01-01 RX ADMIN — OXYCODONE HYDROCHLORIDE AND ACETAMINOPHEN 500 MG: 500 TABLET ORAL at 08:21

## 2021-01-01 RX ADMIN — CARVEDILOL 6.25 MG: 3.12 TABLET, FILM COATED ORAL at 08:21

## 2021-01-01 RX ADMIN — LOSARTAN POTASSIUM 100 MG: 100 TABLET, FILM COATED ORAL at 08:39

## 2021-01-01 RX ADMIN — ALBUTEROL SULFATE 2 PUFF: 90 AEROSOL, METERED RESPIRATORY (INHALATION) at 00:45

## 2021-01-01 RX ADMIN — AMLODIPINE BESYLATE 10 MG: 10 TABLET ORAL at 11:05

## 2021-01-01 RX ADMIN — EPINEPHRINE 0.5 MCG/KG/MIN: 1 INJECTION PARENTERAL at 16:45

## 2021-01-01 RX ADMIN — Medication 2.5 MG: at 06:39

## 2021-01-01 RX ADMIN — CLOPIDOGREL BISULFATE 75 MG: 75 TABLET ORAL at 08:18

## 2021-01-01 RX ADMIN — Medication 37.5 MG: at 08:06

## 2021-01-01 RX ADMIN — NAPROXEN 250 MG: 250 TABLET ORAL at 20:34

## 2021-01-01 RX ADMIN — ROSUVASTATIN CALCIUM 5 MG: 5 TABLET, FILM COATED ORAL at 21:25

## 2021-01-01 RX ADMIN — AMLODIPINE BESYLATE 10 MG: 10 TABLET ORAL at 08:18

## 2021-01-01 RX ADMIN — LEVOFLOXACIN 750 MG: 5 INJECTION, SOLUTION INTRAVENOUS at 20:30

## 2021-01-01 RX ADMIN — Medication 37.5 MG: at 09:18

## 2021-01-01 RX ADMIN — Medication 37.5 MG: at 08:14

## 2021-01-01 RX ADMIN — DIPHENHYDRAMINE HYDROCHLORIDE 50 MG: 25 CAPSULE ORAL at 23:08

## 2021-01-01 RX ADMIN — LOSARTAN POTASSIUM 100 MG: 100 TABLET, FILM COATED ORAL at 09:18

## 2021-01-01 RX ADMIN — Medication 37.5 MG: at 11:05

## 2021-01-01 RX ADMIN — PANTOPRAZOLE SODIUM 40 MG: 40 TABLET, DELAYED RELEASE ORAL at 08:40

## 2021-01-01 RX ADMIN — AMLODIPINE BESYLATE 10 MG: 10 TABLET ORAL at 07:57

## 2021-01-01 RX ADMIN — PANTOPRAZOLE SODIUM 40 MG: 40 TABLET, DELAYED RELEASE ORAL at 10:58

## 2021-01-01 RX ADMIN — OXYCODONE HYDROCHLORIDE AND ACETAMINOPHEN 500 MG: 500 TABLET ORAL at 08:18

## 2021-01-01 RX ADMIN — PANTOPRAZOLE SODIUM 40 MG: 40 TABLET, DELAYED RELEASE ORAL at 07:57

## 2021-01-01 RX ADMIN — LOSARTAN POTASSIUM 100 MG: 100 TABLET, FILM COATED ORAL at 08:04

## 2021-01-01 RX ADMIN — ALBUTEROL SULFATE 2 PUFF: 90 AEROSOL, METERED RESPIRATORY (INHALATION) at 18:37

## 2021-01-01 RX ADMIN — Medication 50 MCG: at 08:21

## 2021-01-01 RX ADMIN — Medication 50 MCG: at 07:57

## 2021-01-01 RX ADMIN — Medication 50 MCG: at 08:18

## 2021-01-01 RX ADMIN — ALBUTEROL SULFATE 2 PUFF: 90 AEROSOL, METERED RESPIRATORY (INHALATION) at 18:26

## 2021-01-01 RX ADMIN — ROSUVASTATIN CALCIUM 5 MG: 5 TABLET, FILM COATED ORAL at 20:41

## 2021-01-01 RX ADMIN — OXYCODONE HYDROCHLORIDE AND ACETAMINOPHEN 500 MG: 500 TABLET ORAL at 08:39

## 2021-01-01 RX ADMIN — Medication 37.5 MG: at 20:56

## 2021-01-01 RX ADMIN — IOPAMIDOL 100 ML: 755 INJECTION, SOLUTION INTRAVENOUS at 14:49

## 2021-01-01 RX ADMIN — ALBUTEROL SULFATE 2 PUFF: 90 AEROSOL, METERED RESPIRATORY (INHALATION) at 18:57

## 2021-01-01 RX ADMIN — Medication 50 MCG: at 08:04

## 2021-01-01 RX ADMIN — Medication 50 MCG: at 08:39

## 2021-01-01 RX ADMIN — OXYCODONE HYDROCHLORIDE AND ACETAMINOPHEN 500 MG: 500 TABLET ORAL at 07:57

## 2021-01-01 RX ADMIN — FENTANYL CITRATE 25 MCG: 50 INJECTION, SOLUTION INTRAMUSCULAR; INTRAVENOUS at 10:30

## 2021-01-01 RX ADMIN — FENTANYL CITRATE 50 MCG: 50 INJECTION, SOLUTION INTRAMUSCULAR; INTRAVENOUS at 19:41

## 2021-01-01 RX ADMIN — MIDAZOLAM 0.5 MG: 1 INJECTION INTRAMUSCULAR; INTRAVENOUS at 10:44

## 2021-01-01 RX ADMIN — AMLODIPINE BESYLATE 10 MG: 10 TABLET ORAL at 08:06

## 2021-01-01 RX ADMIN — LOSARTAN POTASSIUM 100 MG: 100 TABLET, FILM COATED ORAL at 08:06

## 2021-01-01 RX ADMIN — ALBUTEROL SULFATE 2 PUFF: 90 AEROSOL, METERED RESPIRATORY (INHALATION) at 19:42

## 2021-01-01 RX ADMIN — ALBUTEROL SULFATE 2 PUFF: 90 AEROSOL, METERED RESPIRATORY (INHALATION) at 17:05

## 2021-01-01 RX ADMIN — LIDOCAINE 1 PATCH: 560 PATCH PERCUTANEOUS; TOPICAL; TRANSDERMAL at 08:21

## 2021-01-01 RX ADMIN — LEVOFLOXACIN 750 MG: 5 INJECTION, SOLUTION INTRAVENOUS at 20:56

## 2021-01-01 RX ADMIN — VASOPRESSIN 0.5 UNITS/HR: 20 INJECTION INTRAVENOUS at 17:46

## 2021-01-01 RX ADMIN — ALBUTEROL SULFATE 2 PUFF: 90 AEROSOL, METERED RESPIRATORY (INHALATION) at 08:37

## 2021-01-01 RX ADMIN — IPRATROPIUM BROMIDE AND ALBUTEROL SULFATE 3 ML: .5; 3 SOLUTION RESPIRATORY (INHALATION) at 10:53

## 2021-01-01 RX ADMIN — CLOPIDOGREL BISULFATE 75 MG: 75 TABLET ORAL at 10:55

## 2021-01-01 RX ADMIN — METHYLPREDNISOLONE SODIUM SUCCINATE 125 MG: 125 INJECTION, POWDER, FOR SOLUTION INTRAMUSCULAR; INTRAVENOUS at 11:10

## 2021-01-01 RX ADMIN — Medication 50 MCG: at 10:55

## 2021-01-01 RX ADMIN — SODIUM CHLORIDE 500 ML: 9 INJECTION, SOLUTION INTRAVENOUS at 17:58

## 2021-01-01 RX ADMIN — Medication 50 MCG: at 08:06

## 2021-01-01 RX ADMIN — ALBUTEROL SULFATE 2 PUFF: 90 AEROSOL, METERED RESPIRATORY (INHALATION) at 23:08

## 2021-01-01 RX ADMIN — MIDAZOLAM 0.5 MG: 1 INJECTION INTRAMUSCULAR; INTRAVENOUS at 10:30

## 2021-01-01 RX ADMIN — ALBUTEROL SULFATE 2 PUFF: 90 AEROSOL, METERED RESPIRATORY (INHALATION) at 17:51

## 2021-01-01 RX ADMIN — LIDOCAINE 1 PATCH: 560 PATCH PERCUTANEOUS; TOPICAL; TRANSDERMAL at 07:57

## 2021-01-01 RX ADMIN — LOSARTAN POTASSIUM 100 MG: 100 TABLET, FILM COATED ORAL at 08:21

## 2021-01-01 RX ADMIN — OXYCODONE HYDROCHLORIDE AND ACETAMINOPHEN 500 MG: 500 TABLET ORAL at 08:04

## 2021-01-01 RX ADMIN — OXYCODONE HYDROCHLORIDE AND ACETAMINOPHEN 500 MG: 500 TABLET ORAL at 08:05

## 2021-01-01 RX ADMIN — Medication 37.5 MG: at 20:37

## 2021-01-01 RX ADMIN — ALBUTEROL SULFATE 2 PUFF: 90 AEROSOL, METERED RESPIRATORY (INHALATION) at 13:44

## 2021-01-01 RX ADMIN — LEVOFLOXACIN 750 MG: 5 INJECTION, SOLUTION INTRAVENOUS at 22:19

## 2021-01-01 RX ADMIN — Medication 50 MCG: at 08:05

## 2021-01-01 RX ADMIN — AMLODIPINE BESYLATE 10 MG: 10 TABLET ORAL at 08:39

## 2021-01-01 RX ADMIN — Medication 1 MG: at 08:04

## 2021-01-01 RX ADMIN — Medication 0.06 MCG/KG/MIN: at 16:59

## 2021-01-01 RX ADMIN — IOPAMIDOL 135 ML: 755 INJECTION, SOLUTION INTRAVENOUS at 11:57

## 2021-01-01 RX ADMIN — LOSARTAN POTASSIUM 100 MG: 100 TABLET, FILM COATED ORAL at 08:18

## 2021-01-01 RX ADMIN — PREDNISONE 40 MG: 20 TABLET ORAL at 07:57

## 2021-01-01 RX ADMIN — OXYCODONE HYDROCHLORIDE AND ACETAMINOPHEN 500 MG: 500 TABLET ORAL at 08:38

## 2021-01-01 RX ADMIN — OXYCODONE HYDROCHLORIDE AND ACETAMINOPHEN 500 MG: 500 TABLET ORAL at 13:43

## 2021-01-01 RX ADMIN — AMLODIPINE BESYLATE 10 MG: 10 TABLET ORAL at 08:14

## 2021-01-01 RX ADMIN — PROPOFOL 5 MCG/KG/MIN: 10 INJECTION, EMULSION INTRAVENOUS at 16:25

## 2021-01-01 RX ADMIN — ROSUVASTATIN CALCIUM 5 MG: 5 TABLET, FILM COATED ORAL at 19:45

## 2021-01-01 RX ADMIN — ALBUTEROL SULFATE 2 PUFF: 90 AEROSOL, METERED RESPIRATORY (INHALATION) at 14:37

## 2021-01-01 RX ADMIN — CLOPIDOGREL BISULFATE 75 MG: 75 TABLET ORAL at 08:13

## 2021-01-01 RX ADMIN — Medication 37.5 MG: at 14:12

## 2021-01-01 RX ADMIN — ALBUTEROL SULFATE 2 PUFF: 90 AEROSOL, METERED RESPIRATORY (INHALATION) at 08:07

## 2021-01-01 RX ADMIN — Medication 37.5 MG: at 08:19

## 2021-01-01 RX ADMIN — LOSARTAN POTASSIUM 100 MG: 100 TABLET, FILM COATED ORAL at 07:57

## 2021-01-01 RX ADMIN — AMLODIPINE BESYLATE 10 MG: 10 TABLET ORAL at 09:18

## 2021-01-01 RX ADMIN — LEVOFLOXACIN 750 MG: 5 INJECTION, SOLUTION INTRAVENOUS at 20:43

## 2021-01-01 RX ADMIN — LIDOCAINE 1 PATCH: 560 PATCH PERCUTANEOUS; TOPICAL; TRANSDERMAL at 13:44

## 2021-01-01 RX ADMIN — IPRATROPIUM BROMIDE AND ALBUTEROL SULFATE 3 ML: .5; 3 SOLUTION RESPIRATORY (INHALATION) at 21:52

## 2021-01-01 RX ADMIN — LOSARTAN POTASSIUM 100 MG: 100 TABLET, FILM COATED ORAL at 13:44

## 2021-01-01 RX ADMIN — IPRATROPIUM BROMIDE AND ALBUTEROL SULFATE 3 ML: .5; 3 SOLUTION RESPIRATORY (INHALATION) at 19:51

## 2021-01-01 RX ADMIN — CARVEDILOL 6.25 MG: 3.12 TABLET, FILM COATED ORAL at 13:44

## 2021-01-01 RX ADMIN — ALBUTEROL SULFATE 2 PUFF: 90 AEROSOL, METERED RESPIRATORY (INHALATION) at 08:18

## 2021-01-01 RX ADMIN — Medication 50 MCG: at 08:14

## 2021-01-01 RX ADMIN — ALBUTEROL SULFATE 2 PUFF: 90 AEROSOL, METERED RESPIRATORY (INHALATION) at 01:02

## 2021-01-01 RX ADMIN — LIDOCAINE 1 PATCH: 560 PATCH PERCUTANEOUS; TOPICAL; TRANSDERMAL at 08:43

## 2021-01-01 RX ADMIN — LIDOCAINE 1 PATCH: 560 PATCH PERCUTANEOUS; TOPICAL; TRANSDERMAL at 08:14

## 2021-01-01 RX ADMIN — LIDOCAINE 1 PATCH: 560 PATCH PERCUTANEOUS; TOPICAL; TRANSDERMAL at 08:06

## 2021-01-01 RX ADMIN — PANTOPRAZOLE SODIUM 40 MG: 40 TABLET, DELAYED RELEASE ORAL at 08:04

## 2021-01-01 RX ADMIN — LOSARTAN POTASSIUM 100 MG: 100 TABLET, FILM COATED ORAL at 08:38

## 2021-01-01 RX ADMIN — ALBUTEROL SULFATE 2 PUFF: 90 AEROSOL, METERED RESPIRATORY (INHALATION) at 11:00

## 2021-01-01 RX ADMIN — CARVEDILOL 6.25 MG: 3.12 TABLET, FILM COATED ORAL at 12:28

## 2021-01-01 RX ADMIN — Medication 37.5 MG: at 08:05

## 2021-01-01 RX ADMIN — ALBUTEROL SULFATE 2 PUFF: 90 AEROSOL, METERED RESPIRATORY (INHALATION) at 12:17

## 2021-01-01 RX ADMIN — Medication 50 MCG: at 08:38

## 2021-01-01 RX ADMIN — ALBUTEROL SULFATE 2 PUFF: 90 AEROSOL, METERED RESPIRATORY (INHALATION) at 12:28

## 2021-01-01 RX ADMIN — LOSARTAN POTASSIUM 100 MG: 100 TABLET, FILM COATED ORAL at 08:05

## 2021-01-01 RX ADMIN — CLOPIDOGREL BISULFATE 75 MG: 75 TABLET ORAL at 09:18

## 2021-01-01 RX ADMIN — Medication 37.5 MG: at 13:12

## 2021-01-01 RX ADMIN — ROSUVASTATIN CALCIUM 5 MG: 5 TABLET, FILM COATED ORAL at 19:59

## 2021-01-01 RX ADMIN — OXYCODONE HYDROCHLORIDE AND ACETAMINOPHEN 500 MG: 500 TABLET ORAL at 10:55

## 2021-01-01 RX ADMIN — Medication 37.5 MG: at 09:57

## 2021-01-01 RX ADMIN — ROSUVASTATIN CALCIUM 5 MG: 5 TABLET, FILM COATED ORAL at 19:42

## 2021-01-01 RX ADMIN — Medication 37.5 MG: at 14:38

## 2021-01-01 RX ADMIN — PREDNISONE 40 MG: 20 TABLET ORAL at 10:58

## 2021-01-01 RX ADMIN — ALBUTEROL SULFATE 2 PUFF: 90 AEROSOL, METERED RESPIRATORY (INHALATION) at 19:00

## 2021-01-01 RX ADMIN — ALBUTEROL SULFATE 2 PUFF: 90 AEROSOL, METERED RESPIRATORY (INHALATION) at 08:14

## 2021-01-01 RX ADMIN — AMLODIPINE BESYLATE 10 MG: 10 TABLET ORAL at 08:21

## 2021-01-01 RX ADMIN — ROSUVASTATIN CALCIUM 5 MG: 5 TABLET, FILM COATED ORAL at 20:47

## 2021-01-01 RX ADMIN — AMLODIPINE BESYLATE 10 MG: 10 TABLET ORAL at 13:43

## 2021-01-01 RX ADMIN — COSYNTROPIN 250 MCG: 0.25 INJECTION, POWDER, LYOPHILIZED, FOR SOLUTION INTRAMUSCULAR; INTRAVENOUS at 16:40

## 2021-01-01 RX ADMIN — LOSARTAN POTASSIUM 100 MG: 100 TABLET, FILM COATED ORAL at 11:05

## 2021-01-01 RX ADMIN — CARVEDILOL 6.25 MG: 3.12 TABLET, FILM COATED ORAL at 18:02

## 2021-01-01 RX ADMIN — Medication 50 MCG: at 09:18

## 2021-01-01 RX ADMIN — ROSUVASTATIN CALCIUM 5 MG: 5 TABLET, FILM COATED ORAL at 20:56

## 2021-01-01 RX ADMIN — Medication 37.5 MG: at 20:41

## 2021-01-01 RX ADMIN — PREDNISONE 40 MG: 20 TABLET ORAL at 21:25

## 2021-01-01 RX ADMIN — ALBUTEROL SULFATE 2 PUFF: 90 AEROSOL, METERED RESPIRATORY (INHALATION) at 08:20

## 2021-01-01 RX ADMIN — FENTANYL CITRATE 25 MCG: 50 INJECTION, SOLUTION INTRAMUSCULAR; INTRAVENOUS at 10:44

## 2021-01-01 RX ADMIN — MIDAZOLAM 4 MG: 1 INJECTION INTRAMUSCULAR; INTRAVENOUS at 16:00

## 2021-01-01 RX ADMIN — ROCURONIUM BROMIDE 100 MG: 10 INJECTION INTRAVENOUS at 16:00

## 2021-01-01 RX ADMIN — Medication 2.5 MG: at 19:49

## 2021-01-01 RX ADMIN — IOPAMIDOL 73 ML: 755 INJECTION, SOLUTION INTRAVENOUS at 12:06

## 2021-01-01 RX ADMIN — ALBUTEROL SULFATE 2 PUFF: 90 AEROSOL, METERED RESPIRATORY (INHALATION) at 11:51

## 2021-01-01 RX ADMIN — OXYCODONE HYDROCHLORIDE AND ACETAMINOPHEN 500 MG: 500 TABLET ORAL at 09:18

## 2021-01-01 RX ADMIN — LIDOCAINE 1 PATCH: 560 PATCH PERCUTANEOUS; TOPICAL; TRANSDERMAL at 10:45

## 2021-01-01 RX ADMIN — OXYCODONE HYDROCHLORIDE AND ACETAMINOPHEN 500 MG: 500 TABLET ORAL at 08:06

## 2021-01-01 RX ADMIN — Medication 37.5 MG: at 15:31

## 2021-01-01 RX ADMIN — IPRATROPIUM BROMIDE AND ALBUTEROL SULFATE 3 ML: .5; 3 SOLUTION RESPIRATORY (INHALATION) at 07:56

## 2021-01-01 RX ADMIN — ALBUTEROL SULFATE 2 PUFF: 90 AEROSOL, METERED RESPIRATORY (INHALATION) at 00:05

## 2021-01-01 RX ADMIN — OXYCODONE HYDROCHLORIDE AND ACETAMINOPHEN 500 MG: 500 TABLET ORAL at 08:14

## 2021-01-01 RX ADMIN — IPRATROPIUM BROMIDE AND ALBUTEROL SULFATE 3 ML: .5; 3 SOLUTION RESPIRATORY (INHALATION) at 11:36

## 2021-01-01 RX ADMIN — AMLODIPINE BESYLATE 10 MG: 10 TABLET ORAL at 08:38

## 2021-01-01 RX ADMIN — ALBUTEROL SULFATE 2 PUFF: 90 AEROSOL, METERED RESPIRATORY (INHALATION) at 18:02

## 2021-01-01 RX ADMIN — LIDOCAINE 1 PATCH: 560 PATCH PERCUTANEOUS; TOPICAL; TRANSDERMAL at 08:38

## 2021-01-01 RX ADMIN — FUROSEMIDE 40 MG: 10 INJECTION, SOLUTION INTRAVENOUS at 20:47

## 2021-01-01 RX ADMIN — PREDNISONE 40 MG: 20 TABLET ORAL at 08:04

## 2021-01-01 RX ADMIN — LOSARTAN POTASSIUM 100 MG: 100 TABLET, FILM COATED ORAL at 08:14

## 2021-01-01 RX ADMIN — POLYETHYLENE GLYCOL 3350 17 G: 17 POWDER, FOR SOLUTION ORAL at 10:57

## 2021-01-01 RX ADMIN — ALBUTEROL SULFATE 2 PUFF: 90 AEROSOL, METERED RESPIRATORY (INHALATION) at 17:52

## 2021-01-01 RX ADMIN — IPRATROPIUM BROMIDE AND ALBUTEROL SULFATE 3 ML: .5; 3 SOLUTION RESPIRATORY (INHALATION) at 16:30

## 2021-01-01 RX ADMIN — AMLODIPINE BESYLATE 10 MG: 10 TABLET ORAL at 08:05

## 2021-01-01 RX ADMIN — CARVEDILOL 6.25 MG: 3.12 TABLET, FILM COATED ORAL at 08:38

## 2021-01-01 RX ADMIN — ACETAMINOPHEN 975 MG: 325 TABLET, FILM COATED ORAL at 15:03

## 2021-01-01 RX ADMIN — LEVOFLOXACIN 750 MG: 250 TABLET, FILM COATED ORAL at 11:49

## 2021-01-01 RX ADMIN — PANTOPRAZOLE SODIUM 40 MG: 40 TABLET, DELAYED RELEASE ORAL at 08:14

## 2021-01-01 RX ADMIN — IPRATROPIUM BROMIDE AND ALBUTEROL SULFATE 3 ML: .5; 3 SOLUTION RESPIRATORY (INHALATION) at 16:38

## 2021-01-01 ASSESSMENT — ACTIVITIES OF DAILY LIVING (ADL)
WALKING_OR_CLIMBING_STAIRS_DIFFICULTY: YES
ADLS_ACUITY_SCORE: 25
ADLS_ACUITY_SCORE: 20
ADLS_ACUITY_SCORE: 20
ADLS_ACUITY_SCORE: 22
ADLS_ACUITY_SCORE: 24
ADLS_ACUITY_SCORE: 20
ADLS_ACUITY_SCORE: 20
DIFFICULTY_EATING/SWALLOWING: NO
ADLS_ACUITY_SCORE: 20
WALKING_OR_CLIMBING_STAIRS: AMBULATION DIFFICULTY, REQUIRES EQUIPMENT
CONCENTRATING,_REMEMBERING_OR_MAKING_DECISIONS_DIFFICULTY: NO
ADLS_ACUITY_SCORE: 24
ADLS_ACUITY_SCORE: 20
ADLS_ACUITY_SCORE: 25
ADLS_ACUITY_SCORE: 20
ADLS_ACUITY_SCORE: 20
ADLS_ACUITY_SCORE: 16
ADLS_ACUITY_SCORE: 20
ADLS_ACUITY_SCORE: 22
VISION_MANAGEMENT: GLASSES
ADLS_ACUITY_SCORE: 20
EQUIPMENT_CURRENTLY_USED_AT_HOME: WHEELCHAIR, MANUAL
DRESSING/BATHING_DIFFICULTY: YES
ADLS_ACUITY_SCORE: 20
TOILETING_ISSUES: NO
ADLS_ACUITY_SCORE: 22
ADLS_ACUITY_SCORE: 22
ADLS_ACUITY_SCORE: 24
ADLS_ACUITY_SCORE: 20
WHICH_OF_THE_ABOVE_FUNCTIONAL_RISKS_HAD_A_RECENT_ONSET_OR_CHANGE?: AMBULATION;TOILETING;TRANSFERRING
ADLS_ACUITY_SCORE: 22
ADLS_ACUITY_SCORE: 26
ADLS_ACUITY_SCORE: 22
ADLS_ACUITY_SCORE: 16
ADLS_ACUITY_SCORE: 20
FALL_HISTORY_WITHIN_LAST_SIX_MONTHS: NO
ADLS_ACUITY_SCORE: 20
DIFFICULTY_COMMUNICATING: NO
ADLS_ACUITY_SCORE: 20
DRESSING/BATHING: BATHING DIFFICULTY, REQUIRES EQUIPMENT
ADLS_ACUITY_SCORE: 20
ADLS_ACUITY_SCORE: 22
ADLS_ACUITY_SCORE: 20
ADLS_ACUITY_SCORE: 26
ADLS_ACUITY_SCORE: 20
WEAR_GLASSES_OR_BLIND: YES
ADLS_ACUITY_SCORE: 22
ADLS_ACUITY_SCORE: 24
ADLS_ACUITY_SCORE: 20
DRESSING/BATHING_MANAGEMENT: WALKER
ADLS_ACUITY_SCORE: 20
ADLS_ACUITY_SCORE: 22
PREVIOUS_RESPONSIBILITIES: MEAL PREP
DEPENDENT_IADLS:: CLEANING;COOKING;LAUNDRY;SHOPPING;MEAL PREPARATION;MEDICATION MANAGEMENT;MONEY MANAGEMENT
ADLS_ACUITY_SCORE: 22
ADLS_ACUITY_SCORE: 20
DOING_ERRANDS_INDEPENDENTLY_DIFFICULTY: NO
ADLS_ACUITY_SCORE: 22
ADLS_ACUITY_SCORE: 20

## 2021-01-01 ASSESSMENT — MIFFLIN-ST. JEOR
SCORE: 1502.32
SCORE: 1502.77
SCORE: 1504.88
SCORE: 1513.66
SCORE: 1522.88
SCORE: 1474.65
SCORE: 1503.23
SCORE: 1527.88
SCORE: 1494.88
SCORE: 1478.88
SCORE: 1468.3
SCORE: 1469.21
SCORE: 1503.23
SCORE: 1478.73
SCORE: 1489.16
SCORE: 1505.88
SCORE: 1460.13
SCORE: 1502.77
SCORE: 1443.8
SCORE: 1494.15
SCORE: 1517.74

## 2021-01-01 ASSESSMENT — ENCOUNTER SYMPTOMS
NECK STIFFNESS: 0
COUGH: 1
FEVER: 0
ARTHRALGIAS: 0
COLOR CHANGE: 0
WHEEZING: 1
DIFFICULTY URINATING: 0
HEADACHES: 0
ABDOMINAL PAIN: 0
WEAKNESS: 1
EYE REDNESS: 0
SHORTNESS OF BREATH: 1
CONFUSION: 0

## 2021-01-01 ASSESSMENT — PAIN SCALES - GENERAL
PAINLEVEL: NO PAIN (0)
PAINLEVEL: NO PAIN (0)

## 2021-03-12 PROBLEM — N18.30 CHRONIC KIDNEY DISEASE, STAGE 3 (H): Status: ACTIVE | Noted: 2021-01-01

## 2021-03-12 NOTE — PROGRESS NOTES
Assessment & Plan     Decreased mobility  She needs home care for PT and a mobility asssessement  - HOME CARE NURSING REFERRAL  - PHYSICAL THERAPY REFERRAL; Future    Hypertension goal BP (blood pressure) < 130/80  The current medical regimen is effective;  continue present plan and medications.    - amLODIPine (NORVASC) 10 MG tablet; Take 1 tablet (10 mg) by mouth daily  - hydrALAZINE (APRESOLINE) 25 MG tablet; TAKE 1.5 TABLETS BY MOUTH ONCE DAILY,  - losartan (COZAAR) 100 MG tablet; Take 1 tablet (100 mg) by mouth daily  - Basic metabolic panel  (Ca, Cl, CO2, Creat, Gluc, K, Na, BUN)  - Home Blood Pressure Monitor Order for DME - ONLY FOR DME    Cerebrovascular accident (CVA) due to thrombosis of cerebral artery (H)  The current medical regimen is effective;  continue present plan and medications.    - amLODIPine (NORVASC) 10 MG tablet; Take 1 tablet (10 mg) by mouth daily  - Lipid panel reflex to direct LDL Non-fasting  - Home Blood Pressure Monitor Order for DME - ONLY FOR DME  - HOME CARE NURSING REFERRAL    Stage 3 chronic kidney disease, unspecified whether stage 3a or 3b CKD  The current medical regimen is effective;  continue present plan and medications.    - Basic metabolic panel  (Ca, Cl, CO2, Creat, Gluc, K, Na, BUN)    Long term (current) use of antithrombotics/antiplatelets  The current medical regimen is effective;  continue present plan and medications.    - clopidogrel (PLAVIX) 75 MG tablet; TAKE 1 TABLET BY MOUTH ONCE DAILY    CARDIOVASCULAR SCREENING; LDL GOAL LESS THAN 130    - rosuvastatin (CRESTOR) 5 MG tablet; TAKE A 1/2 TABLET (2.5MG) BY MOUTH EVERY EVENING  - Lipid panel reflex to direct LDL Non-fasting    Peripheral edema  compression hose     She is a new patient to me today.  She is present with her son and a care giver      45 minutes spent on the date of the encounter doing chart review, review of test results, interpretation of tests, patient visit, documentation and discussion  with family            Return in about 6 months (around 9/12/2021) for BP Recheck.    Janis Louis DO  [facility] [place] Granger is a 86 year old who presents for the following health issues  accompanied by her son- Sachin and Tino- caretaker:    HPI     The patient has a history of stroke with some leg weakness in the right.  She had her carotid arteries cleaned out.      Hyperlipidemia Follow-Up      Are you regularly taking any medication or supplement to lower your cholesterol?   Yes- crestor    Are you having muscle aches or other side effects that you think could be caused by your cholesterol lowering medication?  No    Hypertension Follow-up      Do you check your blood pressure regularly outside of the clinic? No     Are you following a low salt diet? No    Are your blood pressures ever more than 140 on the top number (systolic) OR more   than 90 on the bottom number (diastolic), for example 140/90? No      How many servings of fruits and vegetables do you eat daily?  4 or more    On average, how many sweetened beverages do you drink each day (Examples: soda, juice, sweet tea, etc.  Do NOT count diet or artificially sweetened beverages)?   0    How many days per week do you exercise enough to make your heart beat faster?  none    How many minutes a day do you exercise enough to make your heart beat faster? none    How many days per week do you miss taking your medication? 0          Review of Systems   Constitutional, HEENT, cardiovascular, pulmonary, gi and gu systems are negative, except as otherwise noted.      Objective    /70 (BP Location: Right arm, Patient Position: Sitting, Cuff Size: Adult Large)   Pulse 85   Temp 97.8  F (36.6  C) (Oral)   Wt 107.5 kg (237 lb)   SpO2 98%   BMI 40.68 kg/m    Body mass index is 40.68 kg/m .  Physical Exam   GENERAL: alert, no distress, obese and in a  wheelchair  NECK: no adenopathy, no asymmetry, masses, or scars and  thyroid normal to palpation  RESP: lungs clear to auscultation - no rales, rhonchi or wheezes  CV: regular rate and rhythm, normal S1 S2, no S3 or S4, no murmur, click or rub, no peripheral edema and peripheral pulses strong  ABDOMEN: soft, nontender, no hepatosplenomegaly, no masses and bowel sounds normal  MS: 2 + edema to knees  SKIN: no suspicious lesions or rashes  PSYCH: mentation appears normal, affect normal/bright

## 2021-03-14 NOTE — TELEPHONE ENCOUNTER
Triage Call:    -Genaro MANZANO from [facility] calling to inform Dr. Louis as FYI that they received a referral for home care, but were not able to get a hold of patient until today.  -FYI to ordering provider that they will be seeing patient tomorrow and that there was a delay of care due to being unable to reach patient.     Routing to ordering provider and care team as FYI. Thank you.    Edwina Bardales RN, BSN Nurse Triage Advisor 4:20 PM 3/14/2021     Additional Information    Health Information question, no triage required and triager able to answer question    Protocols used: INFORMATION ONLY CALL-A-AH

## 2021-03-17 NOTE — TELEPHONE ENCOUNTER
PT with [facility] home care calling in. She completed initial PT eval. today and is recommending PT 1x week for 2 weeks for exercise, transfer, gait and balance.     Verbal okay given.    Meredith Macedo RN

## 2021-03-17 NOTE — TELEPHONE ENCOUNTER
[facility] now requests orders and shares plan of care/discharge summaries for some patients through [employer].  Please REPLY TO THIS MESSAGE OR ROUTE BACK TO THE AUTHOR in order to give authorization for orders when needed.  This is considered a verbal order, you will still receive a faxed copy of orders for signature.  Thank you for your assistance in improving collaboration for our patients.    ORDER  SN 1week2, 2prn for medication, disease and safety management.  OT eval and treat to include adaptive equipment, ADLs and falls prevention.  PT eval and treat to include strengthening, gait training and falls prevention.    Thanks,  JOSE [email or web address] [email or web address]  [phone]

## 2021-03-19 NOTE — TELEPHONE ENCOUNTER
Forms received from [facility]/Order # [id number]/ For Order: PT 1 week 2 for Janis Louis DO.  Forms placed in provider 'sign me' folder.  Please fax forms to [phone] after completion.    SAULO DAVIS (R)  Radiology Department

## 2021-03-22 NOTE — TELEPHONE ENCOUNTER
Forms received from [facility]/ [facility] and Plan of Care # [id number] - (cert period: 3/15/21 to 5/13/21) for Janis Louis DO.  Forms placed in provider 'sign me' folder.  Please fax forms to [phone] after completion.    Supa Urbina RT (R) (ARRT)  Radiology Dept

## 2021-04-19 NOTE — PROGRESS NOTES
"   SEATING AND WHEELED MOBILITY ASSESSMENT  04/19/21 0900   Quick Adds   Quick Adds Certification;Current Manual Wheelchair   General Information    Rehab Discipline OT   Funding [employer]   Service Outpatient;Occupational Therapy;Seating/Wheeled Mobility Evaluation   Height 5'4\"   Weight 234   Start Of Care Date 04/19/21   Referring Physician Janis Louis   Orders Evaluate And Treat As Indicated;Per Therapist Evaluation   Orders Date 03/12/21   Others Present at Evaluation Son   Rehabilitation Technology Supplier Jose SERNA from [facility]   Current Community Support Housekeeping Service;Family/Friend Caregiver;Emergency Call System  (15+ Private Pay hours PCA/Wk)   Patient role/Employment history Retired   Fall Risk Screen   Fall screen completed by OT   Have you fallen 2 or more times in the past year? No   Have you fallen and had an injury in the past year? No   Is patient a fall risk? No   Medical History   Onset Of Illness/injury Or Date Of Surgery 3/12/21   Medical Diagnosis HTN, CVA, B TSA, OA, DDD R VERONICA   Current Manual Wheelchair   Manual Wheelchair Comments Standard invacare manual wheelchair, improper fit, private pay post cva   Home Accessibility   Living Environment House   Primary Entrance Covered Ramp   All Rooms Wheelchair Accessible No   Rooms Not Accessible Bedroom  (chair can be carried up and then will fit)   Community ADL   Transportation Car   Community Mobility Requirements Medical Appointments;Shopping   Community ADL Comments Family or PCA's assist   Cognitive/Visual/Hearing Status   Observations No Problems Observed During Evaluation   Vision Intact   Hearing Intact   ADL Status   Feeding Independent   Grooming/Hygiene Independent   Dressing Requires Assist   Toileting Independent;Uses Equipment  (grab bars)   Bathing Independent;Uses Equipment  (Shower chair)   Meal Preparation Requires Assist   Home Management Requires Assist   Balance   Unsupported Sitting Balance Uses Upper " Extremities for Balance   Sitting Balance in Chair Uses Upper Extremities for Balance   Standing Balance Uses Upper Extremities for Balance   Ambulation   Ambulation Ambulatory   Ambulation Assist Requires Assist   Ambulation Equipment 2 Wheeled Walker;4 Wheeled Walker with Seat  (leans on forarms, shuffles feet)   Ambulation Comments 20 steps at a time- stair lift to bedroom, or stair lift to recliner chair   Transfers   Transfer Assist Independent;Stand By Assist;Minimal Assist   Transfer Method Stand Pivot   Transfer Comments getting a power lift chair   Neuromuscular   History of Pressure Sores No   Pain No   Coordination UE Impaired;LE Impaired  (slow and akward LEWIS)   Sensory Deficits Reported R side bottom has less sensation per report    Head and Neck   Head and Neck Position Functional   Head Control Good   Upper Extremities   UE ROM WFL   UE Strength R 3+/5 L 4/5   Dominance Right   Pelvis   Anterior/Posterior Pelvis Position Posterior Tilt   Trunk   Anterior/Posterior Trunk Position Increased Thoracic Kyphosis   Lower Extremities   LE ROM L WFL with R lacking full ranges due to weakness   LE Strength R 3/5 L 4/5   LE Comments 1+ pitting edema   Patient Measurements   Other Per atp notes   Education Assessment   Barriers to Learning Physical   Preferred Learning Style Listening;Demonstration   Assessment/Plan   Criteria for Skilled Interventions Met Yes, Treatment Indicated   Treatment Diagnosis impaired participaiton in MRADLS   Therapy Frequency once   Planned Therapy Interventions Wheelchair Management/Propulsion Training   Planned Therapy Interventions Comments Educated viktorian and son on manual wheelchair options and optimal set up for ease of propulsion and limiting of LE edema.   Risks and benefits of treatment have been explained Yes   Patient/family & other staff in agreement with plan of care Yes   Comments Home trials of K5   Session Time   OT Wheelchair Management Minutes ([id number]) 66    Certification   Certification date from 04/19/21   Certification date to 04/19/21   Adult OT Eval Goals   OT Eval Goals (Adult) 1    OT Goal 1   Goal Identifier manual wheelchair   Goal Description Patient/family demonstrates understanding of equipment for independent mobility, including benefits/limitations   Target Date 04/19/21   Date Met 04/19/21   Electronically signed by:  Chaparrita Wilson OTR/L, ATP      Occupational Therapist, Assistive   [phone]      fax: [phone]      [email or web address]  Seating Clinic- [facility], [address]  [address]  [place] [address]

## 2021-06-04 NOTE — TELEPHONE ENCOUNTER
"Received [place] appt request from pts son stating \"My mom is short of breath and retaining water.  Should she be on a diuretic? \"    Called and spoke with pt. Pt was last seen by Dr. Louis 3/12/21. Pt at that time had mild LE swelling and was instructed to use compression stockings.   Pt states the swelling has not worsened. She notices some swelling in both ankles. R ankle a little less then the L. She is able to get her shoes on. She does not know if the swelling is there in the morning. As she does not look at her ankles until she has been up and out of bed for about 1-2 hours then she goes to put her shoes on.  She wears the compression stockings when she has someone who can put them on her and take them off. She does notice a difference in the swelling when she uses them. The swelling is better. She does try to elevate her legs when sitting.   She states she has noticed that she is a little more SOB when she exerts herself, no SOB at rest.  She denies any other symptoms no leg pain or chest pain.  Notified pt would be good to see her back in the clinic to reassess her symptoms again. First available appt with Dr. Louis is a week from today next Friday 6/11/21. Pt wants to wait till that appt.   Notified her that if over the weekend or next week her symptoms change or worsen she should be evaluated in UC/ER or give the clinic a call during normal business hours..   Pt verbalized an understanding.    Meredith Macedo RN    "

## 2021-06-11 NOTE — NURSING NOTE
Issues with EKG not wanting to run- tried it on 2 different machines and computer/laptop- a couple leads would run but the rest were flat lined- saved whatever we got. Dr. Louis was there to review the results before EKG was taken off patient.    Lizette Jay MA

## 2021-06-11 NOTE — PATIENT INSTRUCTIONS
Start the Lasix 10 mg in the morning    Keep wearing the compression hose    Albuterol as needed for shortness of breath    Go see the cardiologist    You may need to see a pulmonologist also    Follow-up for labs only next week to recheck your sodium, potassium, and kidney function on the Lasix    Janis Louis DO

## 2021-06-11 NOTE — PROGRESS NOTES
Assessment & Plan     Exertional shortness of breath    We will have the patient see cardiology and get an echocardiogram.  She should go to the ER if she has acute chest pain.  We will cautiously start Lasix.  Patient has a history of low sodium so we will need to recheck labs next week    - EKG 12-lead complete w/read - Clinics  - XR Chest 2 Views; Future  - CARDIOLOGY EVAL ADULT REFERRAL; Future  - Echocardiogram Complete; Future  - CBC with platelets  - furosemide (LASIX) 20 MG tablet; Take 0.5 tablets (10 mg) by mouth daily    Wheeze  Trial of albuterol.  She may need to see pulmonology for pulmonary function test.  She has a remote history of smoking for 7 years.    - albuterol (PROAIR HFA/PROVENTIL HFA/VENTOLIN HFA) 108 (90 Base) MCG/ACT inhaler; Inhale 2 puffs into the lungs every 6 hours    Hyponatremia  We will recheck today.  She does have hyponatremia for 2 years  - Basic metabolic panel  (Ca, Cl, CO2, Creat, Gluc, K, Na, BUN)    Stage 3 chronic kidney disease, unspecified whether stage 3a or 3b CKD  Starting Lasix and will recheck kidney function  - Basic metabolic panel  (Ca, Cl, CO2, Creat, Gluc, K, Na, BUN)    Morbidly obese (H)  Her mobility is limited so is difficult to address this        45 minutes spent on the date of the encounter doing chart review, review of test results, interpretation of tests, patient visit, documentation and discussion with family          Return in about 1 week (around 6/18/2021) for Lab Work recheck sodium potassium and renal function on Lasix,.    Janis Louis DO  [facility]    Rene Palmer is a 86 year old who presents for the following health issues  accompanied by her son:    HPI     Concern - Leg/ankle swelling  Onset: several months  Description: swelling   Intensity: moderate  Progression of Symptoms:  worsening  Accompanying Signs & Symptoms: swelling, shortness of breath- more winded  Previous history of similar problem:  none  Precipitating factors:        Worsened by: movement  Alleviating factors:        Improved by: none  Therapies tried and outcome: rest, elevating    The sob and wheeze are new the last month or so.  She was a smoker for about 7 years and quit in 1988.  She denies any history of asthma.  She has been sleeping propped up the last few years.  She has sob with laying flat.     Patient had 2+ edema on her exam on March 12, 2021.  At that visit she was told to use compression hose.  She did not have shortness of breath at that visit.    The patient has a history of embolic stroke and is on Plavix for this.  She does not have a history of cardiac issues.  Her last echo was normal with ejection fraction of 65 in 2016.      Review of Systems   Constitutional, HEENT, cardiovascular, pulmonary, gi and gu systems are negative, except as otherwise noted.      Objective    /70 (BP Location: Right arm, Patient Position: Sitting, Cuff Size: Adult Large)   Pulse 98   Temp 98.3  F (36.8  C)   Resp 24   SpO2 96%   There is no height or weight on file to calculate BMI.  Physical Exam   GENERAL: healthy, alert and no distress  NECK: no adenopathy, no asymmetry, masses, or scars and thyroid normal to palpation  RESP: lungs clear to auscultation - no rales, rhonchi or wheezes  CV: regular rate and rhythm, normal S1 S2, no S3 or S4, no murmur, click or rub, no peripheral edema and peripheral pulses strong  MS: 2+ edema to knees knees  SKIN: no suspicious lesions or rashes  PSYCH: mentation appears normal, affect normal/bright    No results found for this or any previous visit (from the past 24 hour(s)).

## 2021-06-14 NOTE — PROGRESS NOTES
This patient has a future lab only appointment and needs orders.  Th patient states for electrolytes and liver. Thanks. kati

## 2021-06-15 NOTE — PROGRESS NOTES
REQUISITION AND JUSTIFICATION FOR DURABLE MEDICAL EQUIPMENT    Patient Name:  Bárbara Vuong  MR #:  [id number]  :  1935  Age/Gender:  86 year old female  Visit Date:  Bárbara Vuong seen for seating and wheeled mobility evaluation by Chaparrita Wilson OTR/L,ATP and ATP from [facility] on 21.    CLINICAL CRITERIA FOR MOBILITY ASSISTIVE EQUIPMENT  Coverage Criteria Per Local Coverage Determination  A) Bárbara has mobility limitations due to CVA, HTN, B TSA, OA, DD and R TKA that significantly impairs her ability to participate in all of her mobility-related activities of daily living (MRADL). Specifically affected are toileting (being able to get there in time to prevent accidents), dressing, and bathing (getting into the bathroom of designated place). Current equipment used is standard [employer] manual wheelchair, private pay, that does not fit patient properly causing challenges with self propulsion and full time fit. This patient needs the new equipment requested to be able to increase safe and independent participation on all MRADLS and IADLS. Please see additional documentation in the seating and wheeled mobility report for details.   Bárbara had a successful clinical trial here, and also a successful trial at home with the recommended equipment. Bárbara is very willing and physically / cognitively able to use the recommended equipment to assist her with mobility-related activities of daily living and general mobility.     B) Sofias mobility limitation cannot be sufficiently and safely resolved by the use of an appropriately fitted cane or walker because requires assistance with all mobility with use of four wheeled walker.  She only takes about 20 steps at a time to get to from chair to chair.  She leans forward on her walker for support, shuffles her feet and requires assist from caregiver to prevent falls. Strength of legs is R 3/5 L 4/5 for one maximal repetition. Fatigue also impacts this patient's  ability to ambulate, regardless of the gait aid.    C) Bárbara does not have sufficient upper extremity function to self-propel a K1-4 manual chair and requires a K5 optimally-configured lightweight manual wheelchair in her home to perform MRADLs during a typical day due to limitations in strength, endurance, range of motion, and coordination. Patient able to demo use of UE and LE for propulsion of K5 chair with proper set up of wheels and seat to floor height in requested model.  Strength of arms is R 3+/5 L 4/5.  D) The need for this equipment is LIFETIME.     RECOMMENDATIONS FOR MOBILITY BASE, SEATING SYSTEM AND COMPONENTS  [employer] A7 ultra lightweight manual wheelchair - this ultra light weight manual wheelchair is appropriate and necessary for Bárbara to be able to assist and complete all of her MRADLs within her residence. She has mobility limitations impacting hedf ability to ambulate independently or with any ambulation aid. She has had a thorough clinical evaluation, and this manual wheelchair is the best option for this patient.  Any less costly wheelchair option would be unable to accommodate anterior-posterior axle adjustments to maximize propulsion mechanics required for shoulder preservation in full-time, active manual wheelchair propeller.      HD package- needed secondary to due 21x18 seat size needed in order to fit patients measurements.    Angle adjustable footrests - for leg support    8 degree back canes- allows for proper integration with backrest and prevents digging into her back with sitting in chair.    Narrow position of handrims- to allow for ergonomic  and increase propulsion techniques    Extension handle for wheel locks- allows for independence use of brakes for safety with chair use.    T arm 2 post, flip back, height adjustable, full length armrests - needed for arm support at appropriate height, not available with standard armrests    Rear anti-tip tubes - for safety to  prevent w/c tipping rearward    Pelvic positioning strap - to assist maintaining pelvis position for functional activities    Matrx Nimco seat cushion - this pressure distribution and positioning seat cushion will optimally  distribute seating pressures to prevent pressure ulcers, but also provide a stable base of support for her to use during MRADLs.    Matrx elite Solid curved and padded back support - firm and contoured back support is needed to support Bárbara's thoracolumbar area in an upright and midline position, with appropriate support pads as needed. This back support is essential to provide sufficient posterior support to maximize her postural alignment and minimize her tendency to develop scoliosis and other secondary complications.    This equipment is reasonable and necessary with reference to accepted standards of medical practice and treatment of this patient's condition and is not being recommended as a convenience item. Without this recommended equipment, she is highly likely to sustain injuries from falls, develop pressure sores or postural compensation, and/or be bed confined, which those costs far exceed the cost of the requested equipment.    Electronically signed by:  Chaparrita CHEN, ATP      Occupational Therapist, Assistive   [phone]      fax: [phone]      [email or web address]  [facility]- [facility], [place] [address]  Eda 15, 2021    I have read and concur with the above recommendations.    Physician Printed Name __________________________________________    Physician SIgnature  _____________________________________________    Date of SIgnature ______________________________    Physician Phone  ______________________________

## 2021-06-17 NOTE — TELEPHONE ENCOUNTER
"Routing to Dr Louis, We received a request for medical records because the patient is trying to get a \"ultra lightweight manual wheelchair\". The medical records must include: 1) Need, 2) Alternate 3) Use 4) Benefit, 5) Ultra lightweight 6) Accessories. See the faxed request for more detailed information - I placed it in your \"sign me folder\".    Alba Celaya [facility]    "

## 2021-06-17 NOTE — TELEPHONE ENCOUNTER
Forms received from [facility] [employer]/ Medical Record Request, Standard Written Order: Rehab Acessories, Requisition and justification for durabe medical equipment. for Janis Louis DO.  Forms placed in provider 'sign me' folder.  Please fax forms to [phone] after completion.    Alba Celaya

## 2021-06-17 NOTE — TELEPHONE ENCOUNTER
The patient will need a mobility assessment through physical therapy.  I have placed a referral    Janis Louis DO

## 2021-06-18 NOTE — TELEPHONE ENCOUNTER
Spoke to patient and notified her she needs mobility assessment and provided her with phone number to call. Patient voiced understanding. Patient will call and set up an appointment.    Thank you,  Amy THAPA  [facility]  Team [place] Coordinator

## 2021-06-19 PROBLEM — J96.01 ACUTE RESPIRATORY FAILURE WITH HYPOXIA (H): Status: ACTIVE | Noted: 2021-01-01

## 2021-06-19 PROBLEM — R91.8 PULMONARY NODULES: Status: ACTIVE | Noted: 2021-01-01

## 2021-06-19 PROBLEM — R06.2 WHEEZING: Status: ACTIVE | Noted: 2021-01-01

## 2021-06-19 PROBLEM — J90 PLEURAL EFFUSION: Status: ACTIVE | Noted: 2021-01-01

## 2021-06-19 NOTE — ED PROVIDER NOTES
ED Provider Note  [facility] EMERGENCY DEPARTMENT ([facility])  June 19, 2021    History     Chief Complaint   Patient presents with     Shortness of Breath     HPI  Bárbara Vuong is a 86 year old female with a past medical history including carotid stenosis with cerebral infarction, stage III CKD, morbid obesity, DDD who presents to the Emergency Department for evaluation of shortness of breath with associated generalized weakness and coughing.  Patient is accompanied by her son who reports for her as she has difficulty speaking a long time due to shortness of breath.  For the last month she has been laboring to breathe, laboring has worsened over the past couple weeks.  Went to the doctor 8 days ago and says there was an x-ray performed (results below).  She has had trouble inhaling, worsened by the fact that she typically has trouble inhaling deeply.  No pulse oximeter at home.  Endorses wheezing.  Endorses productive cough upon exertion such as getting into and out of a chair.  She has been gradually losing strength as breathing troubles continue.  Denies taking prednisone or other steroids, takes blood pressure medications.  Lives alone.  Patient notes that when sleeping she sleeps in an inclined bed or recliner and cannot lay flat, takes Benadryl.  Had moderately low sodium but went up a week ago.    XR CHEST TWO VIEWS   6/11/2021 1:10 PM     IMPRESSION: PA and lateral views of the chest were obtained.  Cardiomediastinal silhouette is within normal limits. Right midlung  pulmonary opacities, could represent infection versus atelectasis.  Mild bibasilar pulmonary opacities, likely atelectasis. No significant  pleural effusion or pneumothorax. Postsurgical changes of bilateral shoulder arthroplasties      Past Medical History  Past Medical History:   Diagnosis Date     CARDIOVASCULAR SCREENING; LDL GOAL LESS THAN 130 5/9/2010     CVA (cerebral infarction)       Generalized osteoarthrosis, unspecified site      History of blood transfusion      Hypertension goal BP (blood pressure) < 130/80 2011     IV infiltration 2015    IV access: Use right arm, prefers ultrasound with lidocaine     Lichen planus 2011     Rosacea 2011     Past Surgical History:   Procedure Laterality Date     [facility] NONSPECIFIC PROCEDURE      back surgery     [facility] NONSPECIFIC PROCEDURE      elbow surgery     [facility] NONSPECIFIC PROCEDURE      hip replacement, right     [facility] NONSPECIFIC PROCEDURE      fx elbow     albuterol (PROAIR HFA/PROVENTIL HFA/VENTOLIN HFA) 108 (90 Base) MCG/ACT inhaler  amLODIPine (NORVASC) 10 MG tablet  Ascorbic Acid (VITAMIN C) 500 MG CAPS  Cholecalciferol (VITAMIN D) 50 MCG (2000 UT) CAPS  clopidogrel (PLAVIX) 75 MG tablet  furosemide (LASIX) 20 MG tablet  hydrALAZINE (APRESOLINE) 25 MG tablet  losartan (COZAAR) 100 MG tablet  rosuvastatin (CRESTOR) 5 MG tablet      Allergies   Allergen Reactions     Penicillins      Family History  Family History   Problem Relation Age of Onset     C.A.D. Mother      Hypertension Mother         AGE RELATED     Social History   Social History     Tobacco Use     Smoking status: Former Smoker     Packs/day: 1.00     Years: 7.00     Pack years: 7.00     Types: Cigarettes     Quit date: 10/26/1987     Years since quittin.6     Smokeless tobacco: Never Used   Substance Use Topics     Alcohol use: Yes     Alcohol/week: 7.0 standard drinks     Comment: one drink per night     Drug use: No      Past medical history, past surgical history, medications, allergies, family history, and social history were reviewed with the patient. No additional pertinent items.       Review of Systems   Constitutional: Negative for fever.   HENT: Negative for congestion.    Eyes: Negative for redness.   Respiratory: Positive for cough, shortness of breath and wheezing.    Cardiovascular: Negative for chest pain and leg swelling.   Gastrointestinal:  "Negative for abdominal pain.   Genitourinary: Negative for difficulty urinating.   Musculoskeletal: Negative for arthralgias and neck stiffness.   Skin: Negative for color change.   Neurological: Positive for weakness (generalized). Negative for headaches.   Psychiatric/Behavioral: Negative for confusion.   All other systems reviewed and are negative.    A complete review of systems was performed with pertinent positives and negatives noted in the HPI, and all other systems negative.    Physical Exam   BP: (!) 140/74  Pulse: 98  Temp: 98.3  F (36.8  C)  Resp: 20  Height: 165.1 cm (5' 5\")  Weight: 105.2 kg (232 lb)  SpO2: 94 %  Physical Exam  Constitutional:       General: She is not in acute distress.     Appearance: She is not diaphoretic.   HENT:      Head: Atraumatic.      Mouth/Throat:      Mouth: Mucous membranes are dry.      Pharynx: No oropharyngeal exudate.   Eyes:      General: No scleral icterus.     Pupils: Pupils are equal, round, and reactive to light.   Neck:      Musculoskeletal: Neck supple.   Cardiovascular:      Rate and Rhythm: Normal rate and regular rhythm.      Heart sounds: Normal heart sounds. No murmur. No friction rub. No gallop.    Pulmonary:      Effort: Tachypnea, accessory muscle usage and prolonged expiration present. No respiratory distress.      Breath sounds: Decreased air movement present. No stridor or transmitted upper airway sounds. Examination of the right-lower field reveals decreased breath sounds. Decreased breath sounds and wheezing present. No rhonchi or rales.   Abdominal:      General: Bowel sounds are normal.      Palpations: Abdomen is soft.      Tenderness: There is no abdominal tenderness.   Musculoskeletal:         General: No swelling or tenderness.      Right lower leg: No edema.      Left lower leg: No edema.   Skin:     General: Skin is warm.      Findings: No rash.   Neurological:      General: No focal deficit present.      Cranial Nerves: No cranial nerve " deficit.         ED Course     9:53 AM  The patient was seen and examined by Anika Harmon in Room ED06.    Procedures             EKG Interpretation:      Interpreted by Anika Harmon MD, MD  Time reviewed: on arrival  Symptoms at time of EKG: SOB   Rhythm: normal sinus   Rate: normal  Axis: normal  Ectopy: none  Conduction: normal  ST Segments/ T Waves: No ST-T wave changes  Q Waves: none  Comparison to prior: Unchanged from 2016    Clinical Impression: no acute changes             Results for orders placed or performed during the hospital encounter of 06/19/21   XR Chest Port 1 View     Status: None    Narrative    EXAMINATION: XR CHEST PORT 1 VIEW, 6/19/2021 10:05 AM    COMPARISON: 6/11/2021    HISTORY: Shortness of Breath    FINDINGS: Right pleural effusion bibasilar opacities, increased from  prior. Hazy opacity in the right mid and upper lung is similar to  slightly increased. Increased interstitial opacities. Heart size is  stable. Shoulder arthroplasty       Impression    IMPRESSION:   1. Increased right pleural effusion and associated atelectasis.  2. Increased interstitial opacities. Consider edema versus infection.     BONNIE PEREZ MD   CT Chest Pulmonary Embolism w Contrast     Status: None    Narrative    EXAMINATION: CTA pulmonary angiogram, 6/19/2021 12:46 PM     COMPARISON: Chest x-ray 6/19/2021    HISTORY: PE suspected, low/intermediate probability, negative d-dimer    TECHNIQUE: Volumetric helical acquisition of CT images of the chest  from the lung apices to the kidneys were acquired after the  administration of 73 mL of Isovue-370 IV contrast. Post-processed  multiplanar and/or MIP reformations were obtained, archived to PACS  and used in interpretation of this study.     FINDINGS:      Contrast bolus is: adequate.  Exam is negative for acute pulmonary  embolism. Heart size within normal limits.. No significant pericardial  effusion.. Visualized thoracic aorta and main pulmonary  artery  diameters appear within normal limits. Normal 3 vessel branching  pattern of the great vessels. Atherosclerotic calcifications of the  great vessels and aortic arch. Coronary artery calcifications.    Reflux of contrast into the IVC? no  Paradoxical bowing of the interventricular septum to the left? no    Remaining chest: 1.1 cm hypodensity of the left thyroid lobe. Small  sliding hiatal hernia.     Tracheal secretions/debris. Large right pleural effusion. Near  complete decompressive atelectasis of the right lower lobe. Left  basilar atelectasis. Peripheral reticular opacities, predominantly  involving the lingula and left lower lobe. No pneumothorax. 3 mm solid  pulmonary nodule of the left upper lobe (series 9, image 94).    Upper abdomen: Partial fatty atrophy of the pancreas. Contrast within  the bilateral proximal ureters, possibly from prior CT with contrast.    Bones/Soft Tissues: Extensive degenerative changes in the visualized  spine. Left shoulder arthroplasty. No acute osseous abnormalities or  suspicious bony lesions.      Impression    IMPRESSION:   1. No pulmonary embolism.  2. Large right pleural effusion with near complete compressive  atelectasis of the right lower lobe.  3. 1.1 cm hypodensity of the left thyroid lobe. Recommend nonurgent  thyroid ultrasound for further evaluation if not previously done.  4. 3 mm solid pulmonary nodule in the left upper lobe. If the patient  is high risk for lung cancer, consider a follow-up chest CT in 12  months. If the patient is low risk for lung cancer, no follow-up is  necessary per Fleischner Society guidelines.      In the event of a positive result for acute pulmonary embolism  resulting in right heart strain, consider calling the   [facility] hospital  for PERT (Pulmonary Embolism Response Team)  Activation?    PERT -- Pulmonary Embolism Response Team (Multidisciplinary team  including cardiology, interventional radiology, critical  [facility],  hematology)    I have personally reviewed the examination and initial interpretation  and I agree with the findings.    BONNIE PEREZ MD   CBC with platelets differential     Status: Abnormal   Result Value Ref Range    WBC 9.4 4.0 - 11.0 10e9/L    RBC Count 5.03 3.8 - 5.2 10e12/L    Hemoglobin 14.6 11.7 - 15.7 g/dL    Hematocrit 45.0 35.0 - 47.0 %    MCV 90 78 - 100 fl    MCH 29.0 26.5 - 33.0 pg    MCHC 32.4 31.5 - 36.5 g/dL    RDW 14.0 10.0 - 15.0 %    Platelet Count 360 150 - 450 10e9/L    Diff Method Automated Method     % Neutrophils 85.0 %    % Lymphocytes 7.7 %    % Monocytes 6.1 %    % Eosinophils 0.4 %    % Basophils 0.4 %    % Immature Granulocytes 0.4 %    Nucleated RBCs 0 0 /100    Absolute Neutrophil 8.0 1.6 - 8.3 10e9/L    Absolute Lymphocytes 0.7 (L) 0.8 - 5.3 10e9/L    Absolute Monocytes 0.6 0.0 - 1.3 10e9/L    Absolute Eosinophils 0.0 0.0 - 0.7 10e9/L    Absolute Basophils 0.0 0.0 - 0.2 10e9/L    Abs Immature Granulocytes 0.0 0 - 0.4 10e9/L    Absolute Nucleated RBC 0.0    Comprehensive metabolic panel     Status: Abnormal   Result Value Ref Range    Sodium 130 (L) 133 - 144 mmol/L    Potassium 4.5 3.4 - 5.3 mmol/L    Chloride 99 94 - 109 mmol/L    Carbon Dioxide 26 20 - 32 mmol/L    Anion Gap 5 3 - 14 mmol/L    Glucose 108 (H) 70 - 99 mg/dL    Urea Nitrogen 17 7 - 30 mg/dL    Creatinine 0.93 0.52 - 1.04 mg/dL    GFR Estimate 56 (L) >60 mL/min/[1.73_m2]    GFR Estimate If Black 65 >60 mL/min/[1.73_m2]    Calcium 10.1 8.5 - 10.1 mg/dL    Bilirubin Total 0.4 0.2 - 1.3 mg/dL    Albumin 3.4 3.4 - 5.0 g/dL    Protein Total 7.2 6.8 - 8.8 g/dL    Alkaline Phosphatase 91 40 - 150 U/L    ALT 17 0 - 50 U/L    AST 13 0 - 45 U/L   Lactic acid whole blood     Status: None   Result Value Ref Range    Lactic Acid 0.8 0.7 - 2.0 mmol/L   Nt probnp inpatient (BNP)     Status: None   Result Value Ref Range    N-Terminal Pro BNP Inpatient 249 0 - 1,800 pg/mL   CRP inflammation     Status: Abnormal   Result  Value Ref Range    CRP Inflammation 16.0 (H) 0.0 - 8.0 mg/L   Erythrocyte sedimentation rate auto     Status: None   Result Value Ref Range    Sed Rate 16 0 - 30 mm/h   Troponin I     Status: None   Result Value Ref Range    Troponin I ES <0.015 0.000 - 0.045 ug/L   Symptomatic SARS-CoV-2 COVID-19 Virus (Coronavirus) by PCR     Status: None    Specimen: Nasopharyngeal   Result Value Ref Range    SARS-CoV-2 Virus Specimen Source Nasopharyngeal     SARS-CoV-2 PCR Result NEGATIVE     SARS-CoV-2 PCR Comment       Testing was performed using the Xpert Xpress SARS-CoV-2 Assay on the Cepheid Gene-Xpert   Instrument Systems. Additional information about this Emergency Use Authorization (EUA)   assay can be found via the Lab Guide.     EKG 12-lead, tracing only     Status: None (Preliminary result)   Result Value Ref Range    Interpretation ECG Click View Image link to view waveform and result    ISTAT gases lactate betty POCT     Status: None   Result Value Ref Range    Ph Venous 7.37 7.32 - 7.43 pH    PCO2 Venous 47 40 - 50 mm Hg    PO2 Venous 30 25 - 47 mm Hg    Bicarbonate Venous 27 21 - 28 mmol/L    O2 Sat Venous 54 %    Lactic Acid 0.7 0.7 - 2.1 mmol/L     Medications   ipratropium - albuterol 0.5 mg/2.5 mg/3 mL (DUONEB) neb solution 3 mL (has no administration in time range)   methylPREDNISolone sodium succinate (solu-MEDROL) injection 125 mg (125 mg Intravenous Given 6/19/21 1110)   iopamidol (ISOVUE-370) solution 73 mL (73 mLs Intravenous Given 6/19/21 1206)   sodium chloride (PF) 0.9% PF flush 101 mL (101 mLs Intravenous Given 6/19/21 1206)        Assessments & Plan (with Medical Decision Making)  Acute respiratory failure with hypoxia, mid 80 to low 90's O 2 sat this is new, no home O2, VBG ok but CXR new right pleural effusion, CT ne PE but large right pleural effusion and left nodule, complicated with wheezing with some improvement with duo neb, solumedrol, EKG and trop bnp normal, D/W Medicine-admit.       I have  reviewed the nursing notes. I have reviewed the findings, diagnosis, plan and need for follow up with the patient.    New Prescriptions    No medications on file       Final diagnoses:   Acute respiratory failure with hypoxia (H)   Wheezing   Pleural effusion   Pulmonary nodules     I, Matilda Parson, am serving as a trained medical scribe to document services personally performed by Anika Harmon MD, based on the provider's statements to me.   IAnika MD, was physically present and have reviewed and verified the accuracy of this note documented by Matilda Parson.    --    [facility] EMERGENCY DEPARTMENT  6/19/2021     Anika Harmon MD  06/19/21 [id number]

## 2021-06-19 NOTE — PHARMACY-ADMISSION MEDICATION HISTORY
Admission Medication History Completed by Pharmacy    See [facility] Admission Navigator for allergy information, preferred outpatient pharmacy, prior to admission medications and immunization status.     Medication History Sources:     Patient, Patient's Son, Dispense Report    Changes made to PTA medication list (reason):    Added:   o Diphenhydramine HCl PO - Take by mouth nightly as needed for sleep    Deleted:  o None    Changed:  o Rosuvastatin 5 mg tablet, take 1/2 tablet (2.5 mg) PO every evening -> take 1 tablet (5 mg) PO every other evening    Additional Information:    Patient's son stated that the albuterol inhaler has not been effective as of late due to pleural effusion.    Patient's son stated that the patient found the rosuvastatin tablet difficult to cut, so her doctor said that she could take a whole tablet every other night instead.    Patient and Patient's son are reliable historians.    Prior to Admission medications    Medication Sig Last Dose Taking? Auth Provider   albuterol (PROAIR HFA/PROVENTIL HFA/VENTOLIN HFA) 108 (90 Base) MCG/ACT inhaler Inhale 2 puffs into the lungs every 6 hours 6/19/2021 at AM Yes Janis Louis, DO   amLODIPine (NORVASC) 10 MG tablet Take 1 tablet (10 mg) by mouth daily 6/19/2021 at AM Yes Janis Louis DO   Ascorbic Acid (VITAMIN C) 500 MG CAPS Take 1 capsule by mouth daily 6/19/2021 at AM Yes Reported, Patient   Cholecalciferol (VITAMIN D) 50 MCG (2000 UT) CAPS Take 1 capsule by mouth daily 6/19/2021 at AM Yes Reported, Patient   clopidogrel (PLAVIX) 75 MG tablet TAKE 1 TABLET BY MOUTH ONCE DAILY 6/19/2021 at AM Yes Janis Louis,    DIPHENHYDRAMINE HCL PO Take by mouth nightly as needed for sleep 6/18/2021 at PM Yes Unknown, Entered By History   furosemide (LASIX) 20 MG tablet Take 0.5 tablets (10 mg) by mouth daily 6/19/2021 at AM Yes Janis Louis DO   hydrALAZINE (APRESOLINE) 25 MG tablet TAKE 1.5 TABLETS BY MOUTH ONCE DAILY, 6/19/2021 at AM Yes Janis Louis DO    losartan (COZAAR) 100 MG tablet Take 1 tablet (100 mg) by mouth daily 6/19/2021 at AM Yes Janis Louis DO   rosuvastatin (CRESTOR) 5 MG tablet TAKE A 1/2 TABLET (2.5MG) BY MOUTH EVERY EVENING  Patient taking differently: 5 mg TAKE 1 TABLET (5MG) BY MOUTH EVERY OTHER EVENING 6/17/2021 at PM Yes Janis Louis DO     Date completed: 06/19/21    Medication history completed by: Alva Gonzalez     Yes

## 2021-06-19 NOTE — PROGRESS NOTES
Medicine Triage Note    85 y/o F with PMH of CVA who presented with one week of dyspnea, weakness, and coughing.  CT PE negative for PE but large R pleural effusion.  On 3L oxygen.    - Needs diagnostic and therapeutic thoracentesis.   - Med surg, no telemetry

## 2021-06-19 NOTE — ED NOTES
[facility]   ED Nurse to Floor Handoff     Bárbara Vuong is a 86 year old female who speaks English and lives alone,  in a home  They arrived in the ED by car from home    ED Chief Complaint: Shortness of Breath    ED Dx;   Final diagnoses:   Acute respiratory failure with hypoxia (H)   Wheezing   Pleural effusion   Pulmonary nodules         Needed?: No    Allergies:   Allergies   Allergen Reactions     Penicillins    .  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    IV access: Use right arm, prefers ultrasound with lidocaine     Lichen planus 2/17/2011     Rosacea 2/17/2011      Baseline Mental status: WDL  Current Mental Status changes: at basesline    Infection present or suspected this encounter: no  Sepsis suspected: No  Isolation type: No active isolations  Patient tested for COVID 19 prior to admission: YES     Activity level - Baseline/Home:  Walker  Activity Level - Current:   Unknown    Bariatric equipment needed?: No    In the ED these meds were given:   Medications   ipratropium - albuterol 0.5 mg/2.5 mg/3 mL (DUONEB) neb solution 3 mL (has no administration in time range)   methylPREDNISolone sodium succinate (solu-MEDROL) injection 125 mg (125 mg Intravenous Given 6/19/21 1110)   iopamidol (ISOVUE-370) solution 73 mL (73 mLs Intravenous Given 6/19/21 1206)   sodium chloride (PF) 0.9% PF flush 101 mL (101 mLs Intravenous Given 6/19/21 1206)       Drips running?  No    Home pump  No    Current LDAs  Peripheral IV 06/19/21 Right;Anterior Upper forearm (Active)   Number of days: 0       Labs results:   Labs Ordered and Resulted from Time of ED Arrival Up to the Time of  Departure from the ED   CBC WITH PLATELETS DIFFERENTIAL - Abnormal; Notable for the following components:       Result Value    Absolute Lymphocytes 0.7 (*)     All other components within normal limits   COMPREHENSIVE METABOLIC PANEL - Abnormal; Notable for the following components:    Sodium 130 (*)     Glucose 108 (*)     GFR Estimate 56 (*)     All other components within normal limits   CRP INFLAMMATION - Abnormal; Notable for the following components:    CRP Inflammation 16.0 (*)     All other components within normal limits   LACTIC ACID WHOLE BLOOD   NT PROBNP INPATIENT   ERYTHROCYTE SEDIMENTATION RATE AUTO   TROPONIN I   SARS-COV-2 (COVID-19) VIRUS RT-PCR   NURSING DRAW AND HOLD   ISTAT CG4 GASES LACTATE LAUREL NURSING POCT   ISTAT  GASES LACTATE LAUREL POCT       Imaging Studies:   Recent Results (from the past 24 hour(s))   XR Chest Port 1 View    Narrative    EXAMINATION: XR CHEST PORT 1 VIEW, 6/19/2021 10:05 AM    COMPARISON: 6/11/2021    HISTORY: Shortness of Breath    FINDINGS: Right pleural effusion bibasilar opacities, increased from  prior. Hazy opacity in the right mid and upper lung is similar to  slightly increased. Increased interstitial opacities. Heart size is  stable. Shoulder arthroplasty       Impression    IMPRESSION:   1. Increased right pleural effusion and associated atelectasis.  2. Increased interstitial opacities. Consider edema versus infection.     BONNIE PEREZ MD   CT Chest Pulmonary Embolism w Contrast    Impression    RESIDENT PRELIMINARY INTERPRETATION  IMPRESSION:   1. No pulmonary embolism.  2. Large right pleural effusion with near complete decompressive  atelectasis of the right lower lobe.  3. 1.1 cm hypodensity of the left thyroid lobe. Recommend nonurgent  thyroid ultrasound for further evaluation if not previously done.  4. 3 mm solid pulmonary nodule in the left upper lobe. If the patient  is high risk for lung cancer, consider a follow-up chest CT in 12  months. If  "the patient is low risk for lung cancer, no follow-up is  necessary per Fleischner Society guidelines.      In the event of a positive result for acute pulmonary embolism  resulting in right heart strain, consider calling the   [facility] hospital  for PERT (Pulmonary Embolism Response Team)  Activation?    PERT -- Pulmonary Embolism Response Team (Multidisciplinary team  including cardiology, interventional radiology, critical care,  hematology)       Recent vital signs:   /74   Pulse 86   Temp 98.3  F (36.8  C)   Resp 24   Ht 1.651 m (5' 5\")   Wt 105.2 kg (232 lb)   SpO2 95%   BMI 38.61 kg/m      Smita Coma Scale Score: 15 (06/19/21 0932)       Cardiac Rhythm: Normal Sinus  Pt needs tele? No  Skin/wound Issues: rash under left abdominal fold    Code Status: Full Code    Pain control: pt had none    Nausea control: pt had none    Abnormal labs/tests/findings requiring intervention: see epic     Family present during ED course? Yes   Family Comments/Social Situation comments: Son supportive at bedside     Tasks needing completion: None    Lexi Kline, RN  [phone] [facility] ED    "

## 2021-06-19 NOTE — H&P
[facility]    History and Physical - Maroon 3 Service        Date of Admission:  6/19/2021    Assessment & Plan   Bárbara Vuong is a 86 year old female admitted on 6/19/2021. She has a history of carotid stenosis, embolic CVA, CKD 3, HTN presenting with acute on progressive dyspnea found to have acute hypoxic respiratory failure, wheezing with imaging revealing a large right pleural effusion, workup pending for cardiac vs pulmonary etiology.     # Subacute SOB on progressive dyspnea  # Acute hypoxic respiratory failure   # Right sided pleural effusion   # Wheezing   Progressive within the year, worsening subacutely last 6 weeks, now with acute worsening in the last 2 weeks. BNP negative, could be falsely low with patient's obesity, negative troponin, no chest pain. DDX: cardiac with heart failure (orthopnea, pitting edema bilaterally, SOB/GARCIA, volume status difficult to ascertain given body habitus), valvular, CAD, pulmonary with transudative vs exudative (malignancy, empyema, chylothorax, hemothorax- would expect different density on CT), post obstructive pneumonia, COPD, ILD, autoimmune disease, infectious (fungal, bacterial). Wheezing could be from cardiac vs bronchioles, such as COPD, however, given she only has a 7 pack year smoking history and quit in 1988, without a prior diagnosis of COPD, unlikely COPD exacerbation. Received 1 dose of methylprednisolone 125mg in ED for COPD exacerbation  - ECHO   - If ECHO unrevealing, diagnostic/therapeutic thoracentesis 6/20   - Hold plavix/blood thinners in preparation for procedure  - IV lasix 40mg x1  - Duonebs QID  - Will evaluate need for steroids tomorrow after diuresis and ECHO results   - Colonoscopy in 2011 without evidence of malignancy    # Weakness  Likely from hypoxia and respiratory concerns  - PT/OT  - Falls precaution     # Chronic hyponatremia: Baseline 130-132 for the last 2 years, stable, unclear  etiology  # CKD: stable (baseline 1.0)    # History of hypertension   - PTA amlodipine 10mg daily  - PTA hydralazine 37.5mg daily  - PTA losartan 100mg daily   - PTA Crestor 5mg every other day     # History of Embolic CVA  - Hold PTA Plavix 75mg daily     # 3cm pulmonary nodule, MAGGIE   - PCP follow-up, to discuss risks vs benefits given incidental finding     # 1.1cm hypodense thyroid nodule, left  - Outpatient follow-up, needs ultrasound     Diet: Combination Diet Low Saturated Fat Na <2400mg Diet, No Caffeine Diet  Fluid restriction 2000 ML FLUID  Fluids: PO  DVT Prophylaxis: Pneumatic Compression Devices, possible procedure - hold anticoagulation  Bobby Catheter: not present  Code Status: Full Code confirmed with patient 6/19/21         Disposition Plan   Expected discharge: 2 - 3 days, recommended to prior living arrangement vs TCU pending PT/OT recs, on room air or stable oxygen, workup complete     The patient's care was discussed with Dr. Rodriguez.     Rene Marsh MD  [address]  Contact information available via [facility] Paging/Directory  Please see sign in/sign out for up to date coverage information  ______________________________________________________________________    Chief Complaint   Shortness of breath, weakness    History is obtained from the patient and EMR, son (Sachin)     History of Present Illness   Bárbara Vuong is a 86 year old female who presents with shortness of breath, dyspnea on exertion, weakness for a year now. Family noticed worsening in the last few months, then acutely worsening in the last 2 weeks. They saw PCP who thought she had heart failure, so PCP started her on lasix 10 daily, albuterol, and scheduled a cardiology appointment with ECHO. Patient tried the lasix and albuterol (could not inhale very well due to dyspnea), did urinate a lot, but did not think it helped with her breathing. Given worsening,  she called PCP who advised her to come in. Breathing is worse with activity, it now takes her a lot longer to do things, she isn't able to walk very far. Family is worried about her.     New orthopnea for weeks to months now, sleeps in a recliner with pillows. Leg swelling for months, now worse, uses compression stocking. No PND. No fevers/chills, headaches, vision changes, URI symptoms, chest pain/pressure/palpitations, abdominal pain, dysuria, hematuria, diarrhea, constipation, hematochezia, rashes. Does have coughing with some sputum, no blood, but this is months now, not new. Weight gain of about 10-15 lbs since 2 years.     No history of asthma/COPD. Social history below. Family history of RA, vitiligo in her sons. Dad passed away from lung cancer (he smoked)     In the ED, found to be hypoxic needing 1-3L NC. BMP, CBC wnl, negative trop/BNP, EKG without acute ST changes. CXR with right pleural effusion, atelectasis. CT/PE without PE, large pleural effusion and atelectasis on right. She was given methylprednisolone 125mg IV for COPD exacerbation. Duonebs.     Review of Systems    The 10 point Review of Systems is negative other than noted in the HPI or here.    Past Medical History    I have reviewed this patient's medical history and updated it with pertinent information if needed.   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  Past Surgical History:   Procedure Laterality Date     [facility] NONSPECIFIC PROCEDURE       back surgery     [facility] NONSPECIFIC PROCEDURE      elbow surgery     [facility] NONSPECIFIC PROCEDURE      hip replacement, right     [facility] NONSPECIFIC PROCEDURE      fx elbow        Social History    Smoked for 7 pack years, quit in 1988. Drinks 2oz of jack escalante daily for years now. No illicit drug uses such as meth, heroin, cocaine, marijuana. No vaping or cigar smoking.   Lives alone, independent, does have nursing assistance and family to help.     Family History   I have reviewed this patient's family history and updated it with pertinent information if needed.  Family History   Problem Relation Age of Onset     C.A.D. Mother      Hypertension Mother         AGE RELATED       Prior to Admission Medications   Prior to Admission Medications   Prescriptions Last Dose Informant Patient Reported? Taking?   Ascorbic Acid (VITAMIN C) 500 MG CAPS 6/19/2021 at AM  Yes Yes   Sig: Take 1 capsule by mouth daily   Cholecalciferol (VITAMIN D) 50 MCG (2000 UT) CAPS 6/19/2021 at AM  Yes Yes   Sig: Take 1 capsule by mouth daily   DIPHENHYDRAMINE HCL PO 6/18/2021 at PM  Yes Yes   Sig: Take by mouth nightly as needed for sleep   albuterol (PROAIR HFA/PROVENTIL HFA/VENTOLIN HFA) 108 (90 Base) MCG/ACT inhaler 6/19/2021 at AM  No Yes   Sig: Inhale 2 puffs into the lungs every 6 hours   amLODIPine (NORVASC) 10 MG tablet 6/19/2021 at AM  No Yes   Sig: Take 1 tablet (10 mg) by mouth daily   clopidogrel (PLAVIX) 75 MG tablet 6/19/2021 at AM  No Yes   Sig: TAKE 1 TABLET BY MOUTH ONCE DAILY   furosemide (LASIX) 20 MG tablet 6/19/2021 at AM  No Yes   Sig: Take 0.5 tablets (10 mg) by mouth daily   hydrALAZINE (APRESOLINE) 25 MG tablet 6/19/2021 at AM  No Yes   Sig: TAKE 1.5 TABLETS BY MOUTH ONCE DAILY,   losartan (COZAAR) 100 MG tablet 6/19/2021 at AM  No Yes   Sig: Take 1 tablet (100 mg) by mouth daily   rosuvastatin (CRESTOR) 5 MG tablet 6/17/2021 at PM  No Yes   Sig: TAKE A 1/2 TABLET (2.5MG) BY MOUTH EVERY EVENING   Patient taking differently: 5  mg TAKE 1 TABLET (5MG) BY MOUTH EVERY OTHER EVENING      Facility-Administered Medications: None     Allergies   Allergies   Allergen Reactions     Penicillins        Physical Exam   Vital Signs: Temp: 98.3  F (36.8  C)   BP: 137/83 Pulse: 102   Resp: 22 SpO2: 94 % O2 Device: Nasal cannula Oxygen Delivery: 1 LPM  Weight: 232 lbs 0 oz    General Appearance: alert, oriented, uncomfortable, dyspneic at rest and worse with conversation. Son at bedside (luiza)   Eyes: no scleral icterus, EOMI, PERRL, blue eyes   HEENT: moist mucous membranes, no oropharyngeal edema  Respiratory: diminished air movement, dyspneic, 95% on 1L desats with any movement to <90%, diminished right more than left, wheezing diffusely, deep breaths with coughing, no crackles   Cardiovascular: tachycardic, up to 120 with activity, difficult to auscultate for murmurs given coughing, dyspnea, tachypnea. JVD difficult to ascertain given obese habitus  GI: soft, obese, non-tender to palpation, non-distended  Skin: no rashes on exposed skin   Musculoskeletal: 1+ pitting edema to mid shin, wwp, cap refill < 3 seconds   Neurologic: alert and oriented, CN 2-12 grossly intact, 5/5 strength in UE, 4/5 strength in LLE, 5/5 strength in RLE   Psychiatric: mood is good, talkative, pleasant, laughs a lot     Data   Data reviewed today: I reviewed all medications, new labs and imaging results over the last 24 hours.     Recent Labs   Lab 06/19/21  1047 06/18/21  1420   WBC 9.4  --    HGB 14.6  --    MCV 90  --      --    * 131*   POTASSIUM 4.5 4.6   CHLORIDE 99 98   CO2 26 23   BUN 17 19   CR 0.93 1.07*   ANIONGAP 5 10   RACHEL 10.1 10.4*   * 122*   ALBUMIN 3.4 3.5   PROTTOTAL 7.2 7.4   BILITOTAL 0.4 0.4   ALKPHOS 91 89   ALT 17 20   AST 13 15   TROPI <0.015  --      Recent Results (from the past 24 hour(s))   XR Chest Port 1 View    Narrative    EXAMINATION: XR CHEST PORT 1 VIEW, 6/19/2021 10:05 AM    COMPARISON: 6/11/2021    HISTORY: Shortness  of Breath    FINDINGS: Right pleural effusion bibasilar opacities, increased from  prior. Hazy opacity in the right mid and upper lung is similar to  slightly increased. Increased interstitial opacities. Heart size is  stable. Shoulder arthroplasty       Impression    IMPRESSION:   1. Increased right pleural effusion and associated atelectasis.  2. Increased interstitial opacities. Consider edema versus infection.     BONNIE PEREZ MD   CT Chest Pulmonary Embolism w Contrast    Narrative    EXAMINATION: CTA pulmonary angiogram, 6/19/2021 12:46 PM     COMPARISON: Chest x-ray 6/19/2021    HISTORY: PE suspected, low/intermediate probability, negative d-dimer    TECHNIQUE: Volumetric helical acquisition of CT images of the chest  from the lung apices to the kidneys were acquired after the  administration of 73 mL of Isovue-370 IV contrast. Post-processed  multiplanar and/or MIP reformations were obtained, archived to PACS  and used in interpretation of this study.     FINDINGS:      Contrast bolus is: adequate.  Exam is negative for acute pulmonary  embolism. Heart size within normal limits.. No significant pericardial  effusion.. Visualized thoracic aorta and main pulmonary artery  diameters appear within normal limits. Normal 3 vessel branching  pattern of the great vessels. Atherosclerotic calcifications of the  great vessels and aortic arch. Coronary artery calcifications.    Reflux of contrast into the IVC? no  Paradoxical bowing of the interventricular septum to the left? no    Remaining chest: 1.1 cm hypodensity of the left thyroid lobe. Small  sliding hiatal hernia.     Tracheal secretions/debris. Large right pleural effusion. Near  complete decompressive atelectasis of the right lower lobe. Left  basilar atelectasis. Peripheral reticular opacities, predominantly  involving the lingula and left lower lobe. No pneumothorax. 3 mm solid  pulmonary nodule of the left upper lobe (series 9, image 94).    Upper  abdomen: Partial fatty atrophy of the pancreas. Contrast within  the bilateral proximal ureters, possibly from prior CT with contrast.    Bones/Soft Tissues: Extensive degenerative changes in the visualized  spine. Left shoulder arthroplasty. No acute osseous abnormalities or  suspicious bony lesions.      Impression    IMPRESSION:   1. No pulmonary embolism.  2. Large right pleural effusion with near complete compressive  atelectasis of the right lower lobe.  3. 1.1 cm hypodensity of the left thyroid lobe. Recommend nonurgent  thyroid ultrasound for further evaluation if not previously done.  4. 3 mm solid pulmonary nodule in the left upper lobe. If the patient  is high risk for lung cancer, consider a follow-up chest CT in 12  months. If the patient is low risk for lung cancer, no follow-up is  necessary per Fleischner Society guidelines.      In the event of a positive result for acute pulmonary embolism  resulting in right heart strain, consider calling the   [facility] hospital  for PERT (Pulmonary Embolism Response Team)  Activation?    PERT -- Pulmonary Embolism Response Team (Multidisciplinary team  including cardiology, interventional radiology, critical care,  hematology)    I have personally reviewed the examination and initial interpretation  and I agree with the findings.    BONNIE PEREZ MD

## 2021-06-20 NOTE — PLAN OF CARE
RN assumed cares [id number]  Vitals: VSS on RA. No O2 needs.   Pain: Reports headache. Tylenol given without relief. Provider to order Aleve.   Neuro: A&Ox4.   Cardiac: Sinus tachycardia. Tele discontinued.   Respiratory: Dyspnea on exertion. Breathing better today after thoracentesis and continued nebs.  GI/: BM x 1. Voiding adequately via purewick.   IV/Drains: L PIV saline locked.   Activity: Ax2 w/ walker and GB. Up to the commode and chair today.   Skin: WDL. Interdry ordered for pannus. BUE bruising. Coccyx reddened but blanchable.    Labs: WDL    Plan of Care: Monitor O2 and continue plan of care.

## 2021-06-20 NOTE — PLAN OF CARE
OT 5B: cancel, pt having thoracentesis today upon attempt, unable to check back later with schedule demands, will reschedule.

## 2021-06-20 NOTE — PROCEDURES
Procedure Note:    Procedure Name: Thoracentesis  Procedurist (primary): Dr Rene Marsh, Dr Noam Serna  Pre-procedure diagnosis: pleural effusion  Post-procedure diagnosis: pleural effusion  Indication: pleural effusion (new).   Dyspnea    Consent was obtained from pt and placed in the chart.   Ultrasound used to locate pleural fluid, and optimal location while patient in upright position.  Site chosen appx 5-10 cm from spine on right side, immediately above rib.  Universal protocol performed with nursing.   8ml of 1% lidocaine used anesthetize with 25 gauge needle.   [employer] catheter used to obtain fluid, which was yellow, translucent in appearance, and was sent to lab.   1300 ml total fluid obtained.      Patient tolerated well.   Post-procedural XR ordered.    Alf Schneider MD  Med-Peds Hospitalist

## 2021-06-20 NOTE — PROGRESS NOTES
Pt alert and oriented x 4, Desats on room air, otherwise, vitally stable on 2L O2. Two RN skin assessment done by Sapna Tompkins RN and Екатерина ANDRADE RN with the following findings: Bilateral UE bruising, left PIV saline locked, multiple skin moles on left upper back, coccyx is red but blanchable, bilateral LE edema of +1.

## 2021-06-20 NOTE — PROGRESS NOTES
Reason for admission: R pleural effusion, wheezing, acute hypoxic respiratory failure.   Admitted from: [facility] ED  Report received from: Lexi  Detailed Belongings: iphone, eye mask, gold ring wheelchair (with green tag), slippers, and a night gown. All other belongings brought home by son, Sachin.     Pt. Arrived from the ED at 1900. Pt was slid over to the bed, unable to stand. Purewick in place, incontinent. VSS on 1L O2. Oriented to the room and call light.

## 2021-06-20 NOTE — PROGRESS NOTES
06/20/21 0858   Quick Adds   Type of Visit Initial PT Evaluation   Living Environment   People in home alone   Current Living Arrangements house   Home Accessibility wheelchair accessible;other (see comments)  (uses stair lift, has to go up 2 stairs to get to lift)   Transportation Anticipated family or friend will provide   Living Environment Comments Lives alone in 2 story house, uses chair lift for stairs, ramp in garage, wheelchair accessible.   Self-Care   Usual Activity Tolerance fair   Current Activity Tolerance poor   Regular Exercise No   Equipment Currently Used at Home wheelchair, manual;walker, standard;grab bar, tub/shower;walker, rolling;shower chair;raised toilet seat;grab bar, toilet  (bed raises, bed rails)   Activity/Exercise/Self-Care Comment Wheelchair for community mobility and while cooking, uses walker for in home ambulation up to 25 steps, mod I for transfers between equipment, mod I for self cares using various grab bars, raised toilet seat, shower chair.   Disability/Function   Hearing Difficulty or Deaf no   Wear Glasses or Blind yes   Vision Management reading glasses   Concentrating, Remembering or Making Decisions Difficulty no   Difficulty Communicating no   Difficulty Eating/Swallowing no   Walking or Climbing Stairs Difficulty yes   Walking or Climbing Stairs ambulation difficulty, requires equipment;stair climbing difficulty, requires equipment;stair climbing difficulty, dependent;transferring difficulty, requires equipment   Mobility Management walker, wheelchair   Dressing/Bathing Difficulty yes   Dressing/Bathing bathing difficulty, requires equipment   Dressing/Bathing Management shower chair   Toileting issues yes   Toileting Assistance toileting difficulty, requires equipment   Doing Errands Independently Difficulty (such as shopping) yes   Errands Management online delivery   Fall history within last six months no  (unplanned descent to sitting on stair, required assist)    Change in Functional Status Since Onset of Current Illness/Injury yes   General Information   Onset of Illness/Injury or Date of Surgery 06/19/21   Referring Physician Maximo, MD Rene   Patient/Family Therapy Goals Statement (PT) return home to PLOF   Pertinent History of Current Problem (include personal factors and/or comorbidities that impact the POC) Bárbara Vuong is a 86 year old female admitted on 6/19/2021. She has a history of carotid stenosis, embolic CVA, CKD 3, HTN presenting with acute on progressive dyspnea found to have acute hypoxic respiratory failure, wheezing with imaging revealing a large right pleural effusion, workup pending for cardiac vs pulmonary etiology.   Existing Precautions/Restrictions fall   General Observations Activity: ambulate with assist   Cognition   Orientation Status (Cognition) oriented x 4   Affect/Mental Status (Cognition) WFL   Pain Assessment   Patient Currently in Pain No   Integumentary/Edema   Integumentary/Edema no deficits were identifed   Integumentary/Edema Comments requested donning compression socks from home, history of edema   Posture    Posture Protracted shoulders;Forward head position   Range of Motion (ROM)   ROM Comment WFL   Strength   Strength Comments grossly deconditioned   Bed Mobility   Comment (Bed Mobility) pt sitting in chair, did not assess, pt reports requiring assist   Transfers   Transfer Safety Comments anticipating mod Ax2   Gait/Stairs (Locomotion)   Comment (Gait/Stairs) did not assess   Balance   Balance Comments seated balance WNL, reports no unsteadiness   Sensory Examination   Sensory Perception patient reports no sensory changes   Clinical Impression   Criteria for Skilled Therapeutic Intervention yes, treatment indicated   PT Diagnosis (PT) deconditioning   Influenced by the following impairments weakness, fatigue, SOB   Functional limitations due to impairments decreased activity tolerance, decreased functional mobility    Clinical Presentation Stable/Uncomplicated   Clinical Presentation Rationale pt presentation, clinical judgment   Clinical Decision Making (Complexity) low complexity   Therapy Frequency (PT) 5x/week   Predicted Duration of Therapy Intervention (days/wks) 2 weeks   Planned Therapy Interventions (PT) balance training;bed mobility training;home exercise program;neuromuscular re-education;stair training;strengthening;transfer training   Risk & Benefits of therapy have been explained evaluation/treatment results reviewed;care plan/treatment goals reviewed;participants included;patient;son   Clinical Impression Comments Pt demonstrates decreased functional mobility compared to baseline and would benefit from physical therapy.   PT Discharge Planning    PT Discharge Recommendation (DC Rec) Transitional Care Facility   PT Rationale for DC Rec Pt is currently below baseline for functional mobility, needing assist for transfers and bed mobility. Lives alone and needs to be mod I for functional mobility, would benefit from rehab stay prior to returning home. Pt open to rehab, son hesitant.   PT Brief overview of current status  Ax2 for transfers   Total Evaluation Time   Total Evaluation Time (Minutes) 12

## 2021-06-20 NOTE — PLAN OF CARE
[id number]    A&Ox4; no BM this shift; rounded abdomen.  Low fat 2L fluid restriction.  Incontinent with purewick in place and patent.  O2 saturations in mid to low 90s on 2L NC overnight.  Pt breathing through mouth while sleeping.  Requested help repositioning in bed, but did not get up this shift. HTN noted but denies symptoms.  Continuous tele monitoring with sinus tachycardia noted.  Skin tags on back with blanchable redness on bottom.  L PIV SL and patent.  Denies pain.  Calm; cooperative.  Continue cares and assessments per provider instruction; monitor for changes.

## 2021-06-21 NOTE — PLAN OF CARE
[id number]    Neuro: A&Ox4  GI/: rounded abdomen; no BM this shift; purewick in place; denies n/v; 300 mL oral intake; 2L FR   Resp: VSS on RA; GARCIA noted; O2 saturations in mid 90s  Mobility: x2 assist heavy to commode  Cardiac: WDL   Skin: reddened bottom noted; bruising on arms  Lines/Drains: L PIV causing pain with flushing; can remove and not have a PIV per provider as no IV medications  Pain: denies  Behavior: calm; cooperative  VS: VSS on RA    Plan of Care: continue to monitor respiratory status and AM electrolytes.  Encourage movement. Continue cares and assessments per provider instruction; monitor for changes.

## 2021-06-21 NOTE — CONSULTS
Care Management Initial Consult    General Information  Assessment completed with: Patient, Pt and pt's sonSachin  Type of CM/SW Visit: Initial Assessment    Primary Care Provider verified and updated as needed: Yes   Readmission within the last 30 days: no previous admission in last 30 days      Reason for Consult: discharge planning  Advance Care Planning: Advance Care Planning Reviewed: present on chart. Son Sachin Vuong is primary HCA.          Communication Assessment  Patient's communication style: spoken language (English or Bilingual)    Hearing Difficulty or Deaf: no   Wear Glasses or Blind: yes    Cognitive  Cognitive/Neuro/Behavioral: WDL                      Living Environment:   People in home: alone     Current living Arrangements: apartment      Able to return to prior arrangements: no, PT is recommending TCU.        Family/Social Support:  Care provided by: children and granddaughter.   Provides care for: no one  Marital Status:   Children, granddaughter, neighbor. Pt reports her granddaughter comes over every morning and every evening to assist with ADL's. She also pays someone to clean her home and reports her neighbors assist as needed.        Description of Support System: Supportive, Involved    Support Assessment: Adequate family and caregiver support, Adequate social supports    Current Resources:   Patient receiving home care services: No     Community Resources:    Equipment currently used at home: wheelchair, manual, walker, standard, grab bar, tub/shower, walker, rolling, shower chair, raised toilet seat, grab bar, toilet  Supplies currently used at home: None    Employment/Financial:  Employment Status: retired        Financial Concerns: No concerns identified   Referral to Financial Counselor: No       Lifestyle & Psychosocial Needs:        Socioeconomic History     Marital status:      Spouse name: Not on file     Number of children: Not on file     Years of  education: Not on file     Highest education level: Not on file     Tobacco Use     Smoking status: Former Smoker     Packs/day: 1.00     Years: 7.00     Pack years: 7.00     Types: Cigarettes     Quit date: 10/26/1987     Years since quittin.6     Smokeless tobacco: Never Used   Substance and Sexual Activity     Alcohol use: Yes     Alcohol/week: 7.0 standard drinks     Comment: one drink per night     Drug use: No     Sexual activity: Not Currently     Partners: Male       Functional Status:  Prior to admission patient needed assistance:   Dependent ADLs:: Bathing, Ambulation-walker  Dependent IADLs:: Cleaning, Cooking, Laundry, Shopping, Meal Preparation, Medication Management, Money Management  Assesssment of Functional Status: Not at baseline with ADL Functioning, Not at baseline with mobility, Not at  functional baseline, Needs placement in a SNF/TCF for rehabilitation    Mental Health Status:  Mental Health Status: No Current Concerns       Chemical Dependency Status:  Chemical Dependency Status: No Current Concerns             Values/Beliefs:  Spiritual, Cultural Beliefs, Muslim Practices, Values that affect care: yes, pt identifies as Hinduism.              Care Management Follow Up    Length of Stay (days): 2    Expected Discharge Date: 21     Concerns to be Addressed: discharge planning     Patient plan of care discussed at interdisciplinary rounds: Yes    Anticipated Discharge Disposition: Skilled Nursing Facilty, Transitional Care     Anticipated Discharge Services: None  Anticipated Discharge DME: None    Patient/family educated on Medicare website which has current facility and service quality ratings: (Family already has facility in mind)  Education Provided on the Discharge Plan: Yes  Patient/Family in Agreement with the Plan: yes    Referrals Placed by CM/SW: Post Acute Facilities    [facility]  [address]  [facility] [address]  Ph: [phone]  F: [phone]    referral faxed     Private pay costs discussed: transportation costs    Additional Information:  SW met with pt to complete initial assessment and discuss TCU recommendation. Pt aware of TCU recommendation and agreeable, reports she and her son have been in contact with [facility] and they have openings. Pt's son requested wheelchair ride be set up upon discharge and is aware of potential out of pocket costs of this.     Per provider, pt is medically ready for discharge.     TOMER Wong, [facility]  [address]   [facility]  Phone: [phone]  Pager: [phone]

## 2021-06-21 NOTE — PROGRESS NOTES
06/21/21 1353   Quick Adds   Type of Visit Initial Occupational Therapy Evaluation   Living Environment   People in home alone   Current Living Arrangements house   Home Accessibility wheelchair accessible;stairs within home   Number of Stairs, Within Home, Primary 2  (2 steps to stair lift.)   Transportation Anticipated family or friend will provide  (Pt. no longer driving. )   Self-Care   Usual Activity Tolerance fair   Current Activity Tolerance poor   Equipment Currently Used at Home wheelchair, manual;walker, rolling;walker, standard;shower chair;raised toilet seat;grab bar, toilet;other (see comments)  (bed rails)   Activity/Exercise/Self-Care Comment Prior to admit, pt. living alone in a house and was independent with functional mobility and ADL's.    Disability/Function   Wear Glasses or Blind yes   Walking or Climbing Stairs ambulation difficulty, requires equipment   Change in Functional Status Since Onset of Current Illness/Injury yes   General Information   Onset of Illness/Injury or Date of Surgery 06/19/21   Additional Occupational Profile Info/Pertinent History of Current Problem Pt. is a 86 year old female admitted on 6/19/2021. She has a history of carotid stenosis, embolic CVA, CKD 3, HTN presenting with acute on progressive dyspnea found to have acute hypoxic respiratory failure, wheezing with imaging revealing a large right pleural effusion, workup pending for cardiac vs pulmonary etiology.   Existing Precautions/Restrictions fall   Heart Disease Risk Factors Overweight;Lack of physical activity;Medical history   Cognitive Status Examination   Orientation Status orientation to person, place and time   Follows Commands follows multi-step commands   Cognitive Status Comments Continue to assess and complete screen if needed.    Pain Assessment   Patient Currently in Pain No   Posture   Posture not impaired   Range of Motion Comprehensive   General Range of Motion bilateral upper extremity ROM WFL    Coordination   Upper Extremity Coordination No deficits were identified   Bed Mobility   Comment (Bed Mobility) Pt. needed assist for rolling side to side. She completed supine to sit with SBA and cues and sit to supine with SBA, however then needed max assist of 2 for bed positioning.    Transfers   Transfer Comments Pt. did not want to stand during session and she reported she needs 2 people to stand.   Activities of Daily Living   BADL Assessment/Intervention lower body dressing  (Pt stated granddaughter assists with compression socks. )   Instrumental Activities of Daily Living (IADL)   Previous Responsibilities meal prep   Clinical Impression   Criteria for Skilled Therapeutic Interventions Met (OT) yes   OT Diagnosis Decreased independence with functional mobility and ADL's.    OT Problem List-Impairments impacting ADL activity tolerance impaired;flexibility;mobility;strength   Assessment of Occupational Performance 3-5 Performance Deficits   Identified Performance Deficits Decreased independence with grooming, dressing, toileting and toilet transfers, and bathing and shower transfers.    Planned Therapy Interventions (OT) ADL retraining;IADL retraining;bed mobility training;strengthening;transfer training;home program guidelines;progressive activity/exercise   Clinical Decision Making Complexity (OT) low complexity   Therapy Frequency (OT) 5x/week   Predicted Duration of Therapy 3 days   Risk & Benefits of therapy have been explained care plan/treatment goals reviewed;evaluation/treatment results reviewed;patient   OT Discharge Planning    OT Discharge Recommendation (DC Rec) Transitional Care Facility   OT Rationale for DC Rec Pt. is significantly below baseline level of function and will benefit from rehab stay to maximize level of function to allow to return to home.    Total Evaluation Time (Minutes)   Total Evaluation Time (Minutes) 10

## 2021-06-21 NOTE — PLAN OF CARE
RN assumed cares [id number]    Vitals: VSS on RA  Pain: Denies  Neuro: A&Ox4   Cardiac: WDL  Respiratory: GARCIA; O2 sats in the 90s.  GI/: No BM today. Voiding adequately via purewick.   IV/Drains: L PIV removed (provider order). No IV access.   Activity: Ax2 w/ walker and GB. Up to the chair with PT today.   Skin: WDL. Interdry in place. BUE bruising. Coccyx reddened but blanchable.    Labs: WDL     Plan of Care: Continue plan of care.

## 2021-06-21 NOTE — PROGRESS NOTES
[facility]  Progress Note - Maroon 3 Service   Date of Admission: 6/19/2021     Assessment and Plan:   Bárbara Vuong is a 86 year old female admitted on 6/19/2021. She has a history of carotid stenosis, embolic CVA, CKD 3, HTN presenting with acute on progressive dyspnea found to have acute hypoxic respiratory failure, wheezing with imaging revealing a large right pleural effusion, workup pending for cardiac vs pulmonary etiology.     Today:  - Quantiferon gold tb test tomorrow AM    - pH pleural fluid test ordered today   - Discontinued duonebs scheduled QID 6/21  - Start albuterol inhaler q6h 6/21  - Walk test to assess oxygen needs on exertion     # Acute on likely chronic hypoxic respiratory failure   # Large R pleural effusion   Progressive SOB within the year, worsening subacutely last 6 weeks, now with acute worsening in the last 2 weeks. BNP negative, echocardiogram with high normal EF 65-70%, unable to assess diastolic dysfunction. Ddx remains broad and can include malignancy with adjacent pleural effusion, postobstructive pneumonia (though time course does not match), ILD, longer term infectious disease (fungal or atypical). Does have a 7 pack year smoking history though remote, less likely COPD, though no PFTs on file. Diagnostic/therapeutic thoracentesis on 6/20 revealed exudative pleural effusion, WBC cell count 2110, gram stain negative and cultures show NGTD.   - Diagnostic/therapeutic thoracentesis cytology pending  - Discontinued duonebs scheduled QID 6/21  - Start albuterol inhaler q6h 6/21  - Colonoscopy in 2011 without evidence of malignancy  - Walk test 6/21 to assess oxygen needs on exertion    # Mild hypercalcemia  PTH elevated at 296, ionized calcium wnl at 5.0.      # Weakness  Likely from hypoxia and respiratory concerns  - PT/OT recommending TCU  - Falls precaution      # Chronic hyponatremia: Baseline 130-132 for the last 2 years, stable, unclear etiology,  possibly dehydration and unmeasured fluid loss with increased ADH response    # CKD: stable (baseline 1.0)     # History of hypertension   - PTA amlodipine 10mg daily  - PTA hydralazine 37.5mg daily  - PTA losartan 100mg daily   - PTA Crestor 5mg every other day      # History of Embolic CVA  - Hold PTA Plavix 75mg daily prior to thoracentesis, restart after     # 3mm pulmonary nodule, MAGGIE   - PCP follow-up, to discuss risks vs benefits given incidental finding      # 1.1cm hypodense thyroid nodule, left  - Outpatient follow-up, needs ultrasound    Diet: Combination Diet Low Saturated Fat Na <2400mg Diet, No Caffeine Diet  Fluid restriction 2000 ML FLUID  Fluids: PO  DVT Prophylaxis: Pneumatic Compression Devices, likely start lovenox tomorrow post procedure   Bobby Catheter: not present  Code Status: Full Code confirmed with patient 6/19/21    Note written by Lali Dover MS3    Resident/Fellow Attestation   I, Bao Hinds, was present with the medical/GARCÍA student who participated in the service and in the documentation of the note.  I have verified the history and personally performed the physical exam and medical decision making.  I agree with the assessment and plan of care as documented in the note.      Bao Hinds MD  PGY1  Date of Service (when I saw the patient): 06/21/21       Subjective:   Care team notes reviewed. No acute events overnight. Patient reports she is breathing much better since the thoracentesis, able to take deep breaths this morning. Oxygen saturation in the high 90s this morning on room air. Feels shaky after taking the duoneb but states this resolves without intervention. Uses a walker and wheelchair at home since embolic CVA in 2016. Of note, patient reports she was exposed to significant second hand smoke.      4-point ROS reviewed and was negative except as stated otherwise above.     Objective:    Temp:  [95.6  F (35.3  C)-97.4  F (36.3  C)] 95.6  F (35.3  C)  Pulse:   [83-95] 93  Resp:  [18-20] 18  BP: (106-136)/(36-76) 128/66  SpO2:  [94 %-100 %] 94 %    PHYSICAL EXAM:    General Appearance: alert, oriented, comfortable appearing, on oxygen by nasal cannula  Respiratory: clear to auscultation anteriorly, difficult to assess posteriorly due to body habitus  Cardiovascular: no JVD while sitting up in chair, RRR with no murmurs  GI: soft, obese, non-tender to palpation, non-distended  Skin: no rashes on exposed skin   Musculoskeletal: no LE edema  Neurologic: alert and oriented, CN 2-12 grossly intact  Psychiatric: mood good, talkative, congruent affect     Data reviewed today: I reviewed all new lab and imaging results over the last 24 hours.

## 2021-06-22 NOTE — SUMMARY OF CARE
Patient has been assessed for Home Oxygen needs. Oxygen readings:    *Pulse oximetry (SpO2) = 97% on room air at rest while awake.    Pt does not need oxygen while awake and at rest.   Pt shows signs of sleep apnea with an occasional dip to 87% on RA. 1-2L O2 nasal cannula added while sleeping.     *SpO2 = 94% on room air during activity/with exercise. However, pt only able to pivot to bedside commode w/ assist x2. Unable to complete 6 minute walk test.     Oxygen not needed for short transfers. SpO2 WNL for bed to chair pivots. Pt muscular weakness is limiting factor in activity, not oxygen needs.

## 2021-06-22 NOTE — TELEPHONE ENCOUNTER
Karen Dunaway, PT  Janis Louis, DO; Alba Celaya and Dr. Louis,   I was contacted today because Ms. Vuong and her son were advised that they needed a new PT assessment for her wheelchair. Ms. Vuong has already received a full wheelchair assessment and the equipment has been ordered, so no additional visit is needed. The wheelchair vendor, [employer], sent over a letter of medical necessity and a standard written order that were completed by Chaparrita Wilson, the OT who completed the wheelchair assessment.     Dr. Louis, if you could please sign and date that paperwork and send it back to [employer], that will complete the ordering process on our end and they can process the request from there.     I am currently covering for Chaparrita while she is on a SALOMÓN, so please let me know if you have further questions about this. I am in the clinic on Tuesday/Thursday afternoons.     Thank you!   Karen Dunaway, PT, DPT, NCS, ATP  Physical Therapist   [facility]   [address]  [address] [place] [address]   [email or web address]   [phone]  Fax: [phone]       Copied from a staff message.    GREGORIO Rod  [facility]

## 2021-06-22 NOTE — PLAN OF CARE
[id number]    Neuro: A&Ox4  GI/: rounded abdomen; no BM this shift; purewick in place with good output; 2L FR and decreased Na diet  Resp: VSS on RA while awake.  While sleeping, oxygen saturations can dip to high 80s, but return to 90s in <30 seconds; snoring noted.  Potential for sleep apnea.  Denies SOB; lower lobe sounds diminished  Mobility: x2 assist pivot to commode; heavy assist   Cardiac: HTN prior to cardiac med administration  Skin: reddened areas in groin area; interdry and barrier cream placed  Pain: denies  Behavior: calm; cooperative    Plan of Care: Encourage mobility; monitor respiratory status.  Potential walk test with PT/OT involvement today.  Awaiting TCU placement.  Continue cares and assessments per provider instruction; monitor for changes.

## 2021-06-22 NOTE — PROGRESS NOTES
[facility]  Progress Note - Markellen 3 Service   Date of Admission: 6/19/2021     Assessment and Plan:   Bárbara Vuong is a 86 year old female admitted on 6/19/2021. She has a history of carotid stenosis, embolic CVA, CKD 3, HTN presenting with acute on progressive dyspnea found to have acute hypoxic respiratory failure, wheezing with imaging revealing a large right pleural effusion, workup pending for cardiac vs pulmonary etiology.     Today:  - repeat CT chest w/ contrast 6/22  - US thyroid 6/22  - Walk test to assess oxygen needs on exertion  - repeat CRP and lactate  - Procal ordered   - Urine osm, serum osm, urine sodium labs ordered   - Repeat blood cultures 6/22     # Acute on likely chronic hypoxic respiratory failure   # Large R pleural effusion   # 3mm pulmonary nodule, MAGGIE   Progressive SOB within the year, worsening subacutely last 6 weeks, now with acute worsening in the last 2 weeks. BNP negative, echocardiogram with high normal EF 65-70%, unable to assess diastolic dysfunction. Ddx remains broad and can include malignancy with adjacent pleural effusion, postobstructive pneumonia (though time course does not match), ILD, longer term infectious disease (fungal or atypical). Does have a 7 pack year smoking history though remote but hx of significant second hand smoke, less likely COPD, though no PFTs on file. Diagnostic/therapeutic thoracentesis on 6/20 revealed exudative pleural effusion, WBC cell count 2110, gram stain negative and cultures show NGTD, concern for malignancy vs. infection.   - Repeat CT chest w/ contrast 6/22   - Discontinued duonebs scheduled QID 6/21  - Cont albuterol inhaler q6h 6/21  - Colonoscopy in 2011 without evidence of malignancy  - Walk test 6/22, unable to complete 6 min walk test 2/2 weakness, SpO2 mid 90's with activity.   - Infectious workup after concern for SIRS: repeat CRP and lactate on 6/22 pending, procal pending and repeat blood cultures  6/22   - Quantiferon GOLD test pending   - pH pleural fluid pending      # Primary parathyroidism   # Mild hypercalcemia in setting of elevated PTH  # 1.1cm hypodense thyroid nodule, left   PTH elevated at 296, ionized calcium wnl at 5.0. Incidental thyroid nodule 1.1 cm seen on CT chest on 6/19. Concern for malignancy.    - US thyroid 6/22     # Weakness  Likely from hypoxia and respiratory concerns  - PT/OT recommending TCU  - Falls precaution      # Chronic hyponatremia:   Baseline 130-132 for the last 2 years, stable, unclear etiology, possibly dehydration and unmeasured fluid loss with increased ADH response  - Urine osmolality, serum osm, urine sodium labs ordered no 6/22    # CKD: stable (baseline 1.0)     # History of hypertension   - PTA amlodipine 10mg daily  - PTA hydralazine 37.5mg daily  - PTA losartan 100mg daily   - PTA Crestor 5mg every other day      # History of Embolic CVA  - Held PTA Plavix 75mg daily prior to thoracentesis, resumed on 6/20      Diet: Combination Diet Low Saturated Fat Na <2400mg Diet, No Caffeine Diet  Fluid restriction 2000 ML FLUID  Fluids: PO  DVT Prophylaxis: Pneumatic Compression Devices, likely start lovenox tomorrow post procedure   Bobby Catheter: not present  Code Status: Full Code confirmed with patient 6/19/21    Note written by Lali Dover MS3    Resident/Fellow Attestation   I, Bao Hinds, was present with the medical/GARCÍA student who participated in the service and in the documentation of the note.  I have verified the history and personally performed the physical exam and medical decision making.  I agree with the assessment and plan of care as documented in the note.      Bao Hinds MD  PGY1  Date of Service (when I saw the patient): 06/22/21     Subjective:   Care team notes reviewed. No acute events overnight. Patient feeling well this morning but frustrated she is unable to independently perform ADLs due to weakness. Reports her breathing  significantly improved since thoracentesis but continues to have some wheezing. States the albuterol inhaler is not as effective as the Duoneb. Uses a walker and wheelchair at home since embolic CVA in 2016. Of note, patient reports she was exposed to significant second hand smoke.      4-point ROS reviewed and was negative except as stated otherwise above.     Objective:    Temp:  [95.6  F (35.3  C)-97.1  F (36.2  C)] 95.6  F (35.3  C)  Pulse:  [74-95] 74  Resp:  [16-18] 16  BP: (112-150)/(59-77) 150/77  SpO2:  [92 %-97 %] 97 %    PHYSICAL EXAM:    General Appearance: alert, oriented, comfortable appearing, on oxygen by nasal cannula  Respiratory: clear to auscultation anteriorly, mild expiratory wheezing R side  Cardiovascular: no JVD while sitting up in chair, RRR with no murmurs  GI: soft, obese, non-tender to palpation, non-distended  Skin: no rashes on exposed skin   Musculoskeletal: no LE edema  Neurologic: alert and oriented, CN 2-12 grossly intact  Psychiatric: mood good, talkative, congruent affect     Data reviewed today: I reviewed all new lab and imaging results over the last 24 hours.

## 2021-06-22 NOTE — PROGRESS NOTES
Care Management Follow Up    Length of Stay (days): 3    Expected Discharge Date: 06/23/21(TCU)     Concerns to be Addressed: discharge planning     Patient plan of care discussed at interdisciplinary rounds: Yes    Anticipated Discharge Disposition:     [facility]  [address] [facility] [address]  Ph: [phone]  F: [phone]     Anticipated Discharge Services: None  Anticipated Discharge DME: None    Patient/family educated on Medicare website which has current facility and service quality ratings: (Family already has facility in mind)  Education Provided on the Discharge Plan: Yes   Patient/Family in Agreement with the Plan: yes    Referrals Placed by CM/SW: Post Acute Facilities  Private pay costs discussed: transportation costs    Additional Information:  SW spoke with Piedad at [facility], [phone], who confirmed they can take pt in their TCU anytime. Spoke with provider, who stated they would like to keep pt in the hospital one more night to expedite her malignancy work up. LVM with Piedad that pt will likely be ready for discharge tomorrow.     TOMER Wong, LICSW  [address]   [facility]  Phone: [phone]  Pager: [phone]

## 2021-06-22 NOTE — PLAN OF CARE
Assumed cares: [id number]    Events: discharge to TCU delayed due to work-up. Pt transferring to chest CT at end of shift.   VS: VSS on RA. No O2 needed for ambulation or while awake.   Labs: K and Mag WNL. AM recheck. Labs ordered for work-up (elevated CRP and lactic acid, blood cultures drawn). Thyroid US. UA ordered.   IV: PIV ordered for CT.     Neuro: A&Ox4  GI/: Voiding adequately. 1x BM.   Nutrition: Pt ate 50% breakfast. 2L FR, low NA, no caffeine diet. However pt had one cup of coffee  Pain: Pt c/o pain in R shoulder w/ adequate relief from lidocaine patch.   Activity: Pt heavy assist x2 pivot to commode.     Plan: Planned discharge tomorrow to TCU pending work-up.

## 2021-06-22 NOTE — PLAN OF CARE
PT 5B: cancel. Attempted to see pt, transport arrived for CT. Pt refused therapy stating she was leaving today. Per chart pt to disch tomorrow. Will reschedule to tomorrow.

## 2021-06-22 NOTE — PLAN OF CARE
Assumed cares [id number]. A&O and able to make needs known. VSS on RA. Denied pain for most of shift. At bedtime, pt c/o some mild neck pain. Pt requesting Aleve. Paged provider; no response. Pt declined offer for non-pharm interventions such as heat pack and repositioning. Purewick in place with good UO. Orders for walk test to be completed, but unable to complete, as pt is a heavy assist of 2 with stand-pivot transfers to the bedside commode and is unable to safely ambulate beyond that. Oncoming RN notified to have day RN consult with PT/OT for possible assistance with walk test and notify primary day team. Sticky note also left to notify primary team. Awaiting TCU placement. Continue with plan of care.

## 2021-06-23 NOTE — PLAN OF CARE
Assumed cares: [id number]     Events: Thoracentesis completed w/ fluid sample work-up  VS: VSS on RA.   Labs: K and Mag WNL. AM recheck.    Pain: Pt c/o pain in R shoulder and neck w/ adequate relief from lidocaine patch.   Activity: Pt heavy assist x2 pivot to commode. Unable to complete walk test due to weakness. Pt desats to 88% O2 with activity (turning in bed) but no oxygen needed when able to rest.      Plan:  Cosyntropin stimulation study to be completed. 1st cortisol lab ordered. 2nd to be ordered 1hr after cosyntropin administration.

## 2021-06-23 NOTE — PROGRESS NOTES
Care Management Follow Up    Length of Stay (days): 4    Expected Discharge Date: 06/24/21     Concerns to be Addressed: discharge planning     Patient plan of care discussed at interdisciplinary rounds: Yes    Anticipated Discharge Disposition:     [facility]  [address] [place] [address]  Ph: [phone]  F: [phone]     Anticipated Discharge Services: None  Anticipated Discharge DME: None    Patient/family educated on Medicare website which has current facility and service quality ratings: (Family already has facility in mind)  Education Provided on the Discharge Plan: Yes   Patient/Family in Agreement with the Plan: yes    Referrals Placed by CM/SW: Post Acute Facilities  Private pay costs discussed: transportation costs    Additional Information:  SW received communication from pt's med team that she is not medically ready for discharge today. LVM with Piedad @ [facility] ([phone]). SW will continue to follow for TCU placement.     TOMER Wong, LICSW  [address]   [facility]  Phone: [phone]  Pager: [phone]

## 2021-06-23 NOTE — PROGRESS NOTES
Procedure Note    Attending: Dr. Flores   Resident: none  Procedure: Right Thoracentesis  Indication: Re accumulation of Pleural fluid. Dyspnea   Risk Assessment: Normal   Pre-procedure diagnosis: Pleural effusion  Post-procedure diagnosis: same    The risks and benefits of the procedure were explained to Bárbara Vuong who expressed understanding and opted to proceed.  Consent was obtained and placed in the chart and the patient was placed on pulse oximetry.  A time out was performed.    An ultrasound probe was used to identify an area of pleural fluid in the right hemithorax.  This area was prepped and draped in the usual sterile fashion.  5 ml of 1% lidocaine was instilled and fluid located using ultrasound guidance.  A small incision was not made with an 11 blade.  The thoracentesis catheter and needle were inserted above the rib until fluid obtained then the needle removed.    Using a one-way valve, 650 ml of brown/yellow colored fluid was removed. A specimen was sent for analysis.    The catheter was removed with the patient exhaling and an occlusive dressing placed.       Ultrasound revealed normal lung sliding after the procedure and a chest radiograph is pending.    The tolerated the procedure well.  Please contact the Consult and Procedure Service if any concerns or complications arise.       Prem Flores MD on 6/23/2021 at 5:00 PM

## 2021-06-23 NOTE — PLAN OF CARE
Assumed cares [id number]. A&O and able to make needs known. VSS on RA. Pt reports mild pain to R shoulder. Lidocaine patch in place for most of shift, but removed at HS. Purewick in place with good output. Urine sample sent for labs. Chest CT done this shift. New orders for IV abx, which is currently infusing in R PIV. Regular diet with 2L FR. Possible discharge tomorrow. Continue with plan of care.

## 2021-06-23 NOTE — PROGRESS NOTES
[facility]  Progress Note - Markellen 3 Service   Date of Admission: 6/19/2021     Assessment and Plan:   Bárbara Vuong is a 86 year old female admitted on 6/19/2021. She has a history of carotid stenosis, embolic CVA, CKD 3, HTN presenting with acute on progressive dyspnea found to have acute hypoxic respiratory failure, wheezing with imaging revealing a large right pleural effusion, workup pending for cardiac vs pulmonary etiology.     Today:  - Consult pulmonology pending goals of care discussion with family about level of diagnostic interventions desired  - Thoracentesis  - AM cortisol   - Cosyntropin stimulation study   - Start levofloxacin (6/22 - )  - Start prednisone (6/22 - )    # Acute on likely chronic hypoxic respiratory failure   # Large R pleural effusion   # 3mm pulmonary nodule, MAGGIE   Progressive SOB within the year, worsening subacutely last 6 weeks, now with acute worsening in the last 2 weeks. BNP negative, echocardiogram with high normal EF 65-70%, unable to assess diastolic dysfunction. Ddx remains broad and can include malignancy with adjacent pleural effusion, postobstructive pneumonia (though time course does not match), ILD, longer term infectious disease (fungal or atypical). Does have a 7 pack year smoking history though remote but hx of significant second hand smoke, less likely COPD, though no PFTs on file. Diagnostic/therapeutic thoracentesis on 6/20 revealed exudative pleural effusion, WBC cell count 2110, gram stain negative and cultures show NGTD, concern for malignancy vs. infection.   - Consult pulmonology, appreciate recommendations    - Repeat CT chest w/ contrast 6/22 revealed moderate R pleural effusion, concern for re-accumulation of fluid     - Thoracentesis with labs 6/23   - Discontinued duonebs scheduled QID 6/21  - Cont albuterol inhaler q6h 6/21  - Colonoscopy in 2011 without evidence of malignancy  - Walk test 6/22, unable to complete 6 min  walk test 2/2 weakness, SpO2 mid 90's with activity.   - Infectious workup after concern for SIRS: repeat CRP 28 trending up, and repeat lactate 1.6 trending down, procal pending <0.05 and repeat blood cultures 6/22 show NGTD   - Levofloxacin 750 mg (6/22 - )    - Prednisone 40 mg (6/22- )     - Quantiferon GOLD test negative    - pH pleural fluid pending      # Primary parathyroidism   # Mild hypercalcemia in setting of elevated PTH  # 1.1cm hypodense thyroid nodule, left   PTH elevated at 296, ionized calcium wnl at 5.0. Incidental thyroid nodule 1.1 cm seen on CT chest on 6/19. Concern for malignancy.    - US thyroid 6/22 results pending      # Weakness  Likely from hypoxia and respiratory concerns  - PT/OT recommending TCU  - Falls precaution      # Chronic hyponatremia:   Baseline 130-132 for the last 2 years, stable, unclear etiology, possibly dehydration and unmeasured fluid loss with increased ADH response. Serum osmolality 278, urine osmolality 699, and urine sodium 72, concern for glucocorticoid deficiency   - AM cortisol  -  Cosyntropin stimulation study     # CKD: stable (baseline 1.0)     # History of hypertension   - PTA amlodipine 10mg daily  - PTA hydralazine 37.5mg daily  - PTA losartan 100mg daily   - PTA Crestor 5mg every other day      # History of Embolic CVA  - Held PTA Plavix 75mg daily prior to thoracentesis, resumed on 6/20      Diet: Combination Diet Low Saturated Fat Na <2400mg Diet, No Caffeine Diet  Fluid restriction 2000 ML FLUID  Fluids: PO  DVT Prophylaxis: Pneumatic Compression Devices, likely start lovenox tomorrow post procedure   Bobby Catheter: not present  Code Status: Full Code confirmed with patient 6/19/21    Note written by Lali Dover MS3     Subjective:   Care team notes reviewed. No acute events overnight. Patient feeling well this morning, improved shortness of breath but notes she has intermittent cough with sputum production. Patient reluctant to discuss care plan  today, focused on her breakfast and asked for team to speak with her son, Sachin. She continues to have some wheezing that does not significantly improve with albuterol inhaler, states Duoneb is more effective. Uses a walker and wheelchair at home since embolic CVA in 2016. Of note, patient reports she was exposed to significant second hand smoke.      4-point ROS reviewed and was negative except as stated otherwise above.     Objective:    Temp:  [95.8  F (35.4  C)-97  F (36.1  C)] 95.8  F (35.4  C)  Pulse:  [] 102  Resp:  [16] 16  BP: (131-173)/(59-85) 137/70  SpO2:  [92 %-95 %] 95 %    PHYSICAL EXAM:    General Appearance: alert, oriented, comfortable appearing, on oxygen by nasal cannula  Respiratory: clear to auscultation anteriorly, bilateral expiratory and inspiratory wheezes   Cardiovascular: no JVD while sitting up in chair, RRR with no murmurs  GI: soft, obese, non-tender to palpation, non-distended  Skin: no rashes on exposed skin   Musculoskeletal: no LE edema  Neurologic: alert and oriented, CN 2-12 grossly intact    Data reviewed today: I reviewed all new lab and imaging results over the last 24 hours.

## 2021-06-23 NOTE — PLAN OF CARE
Time 8515-4713     Reason for admission: hypoxic respiratory failure w/ R pleural effusion   Vitals:   Temp: 95.8  F (35.4  C) Temp src: Oral BP: (!) 173/85 Pulse: 94   Resp: 16 SpO2: 94 % O2 Device: None (Room air)  Activity: assist x2  Pain: No complaints of pain throughout the night   Neuro: A&Ox4  Cardiac: WDL  Respiratory: Has moments of desating when sleeping. MD made aware of possible CPAP needs.   GI/: Purewick in place with adequate output. No BM this shift   Diet: Regular diet w/ 2L fluid restriction   Lines: PIV saline locked   Skin: redness in groin area.        New changes this shift: Pt had no major changes this shift. She was able to sleep most of the night without any issues. Pt had no complaints of pain throughout the shift. She did desat a couple times when sleeping due to breathing patterns but would go back up right away. Staff yesterday wrote a note to MD about maybe needing a CPAP. Pt had adequate output throughout the night and no BM. Pt is calm and cooperative and able to use call light appropriately to make her needs known.      Plan: Possible discharge today      Continue to monitor and follow POC

## 2021-06-24 NOTE — PLAN OF CARE
Assumed cares [id number]. A&O and able to make needs known. VSS on RA. Pt reports mild pain to R shoulder. Lidocaine patch removed. Cosyntropin study done today. Purewick in place with good output. Chest CT done this shift. CXR done this shift. PIV infusing TKO between IV abx. Low fat, low Na diet with 2L FR. Continue with plan of care.

## 2021-06-24 NOTE — PLAN OF CARE
Assumed cares: [id number]     Events: Pt and son spoke with team about new diagnosis of possible primary lung adenocarcinoma s/p thora x2. Pt wants to think more about the options and would like to discuss options w/ Oncology.   VS: VSS on RA except HTN.   Labs: K and Mag WNL. AM recheck.  Pain: Pt denies pain.   Activity: Pt is an assist x2 (but w/ difficult) pivot to commode. Pt remains in low 90s w/ dyspnea on exertion but no need for oxygen during pivot transfers.     Plan:      PET scan and oncology consult.

## 2021-06-24 NOTE — PLAN OF CARE
PT cx due to planned conference today regarding goals of care.  Will reassess potential to resume PT on 6/25.

## 2021-06-24 NOTE — PLAN OF CARE
OT 5B: cancel, pt was planning on discharging to TCU but not medically ready per MD team, plan for GOC discussion this afternoon per SW note. Will reschedule.

## 2021-06-24 NOTE — PROGRESS NOTES
[facility]  Progress Note - Markellen 3 Service   Date of Admission: 6/19/2021     Assessment and Plan:   Bárbara Vuong is a 86 year old female admitted on 6/19/2021. She has a history of carotid stenosis, embolic CVA, CKD 3, HTN presenting with acute on progressive dyspnea found to have acute hypoxic respiratory failure, wheezing with imaging revealing a large right pleural effusion, newly diagnosed with lung adenocarcinoma on 6/24 per cytology from pleurocentesis . Oncology consulted, and plan to see patient in morning.     # New diagnosis of adenocarcinoma of the lung, recurrent malignant pleural R effusion   # Acute on likely chronic hypoxic respiratory failure   Progressive SOB within the year, worsening subacutely last 6 weeks, now with acute worsening in the last 2 weeks. Diagnostic/therapeutic thoracentesis on 6/20, cytology consistent with adenocarcinoma.  - Patient and son informed of new diagnosis    - Oncology consulted, will see tomorrow to discuss possible further work-up, treatment vs. pursuing comfort cares   - PET scan scheduled for 6/26   - Cont albuterol inhaler q6h 6/21  - Infectious workup after concern for SIRS: repeat CRP 28 trending up, and repeat lactate 1.6 trending down, procal pending <0.05 and repeat blood cultures 6/22 show NGTD. Likely infection overly malignancy.    - Cont Levofloxacin 750 mg (6/22 - )    - Cont  Prednisone 40 mg (6/22- )     - SW consulted, family would prefer discharged to TCU given deconditioning    - may require pleurex cath, vs pleurodesis or plan for repeat thoracenteses     # Primary parathyroidism   # Mild hypercalcemia in setting of elevated PTH  # 1.1cm hypodense thyroid nodule, left   PTH elevated at 296, ionized calcium wnl at 5.0. Incidental thyroid nodule 1.1 cm seen on CT chest on 6/19. Nodule present on thyroid US, possibly related to malignancy, per criteria too small to bx at this time. Could be further characterized with  "PET scan (6/26).      # Weakness  Likely from hypoxia and respiratory concerns  - PT/OT recommending TCU, family in agreement   - Falls precaution      # Chronic hyponatremia:   Baseline 130-132 for the last 2 years, stable, unclear etiology, possibly dehydration and unmeasured fluid loss with increased ADH response. Serum osmolality 278, urine osmolality 699, and urine sodium 72, concern for glucocorticoid deficiency. Cosyntropin stim test with appropriate cortisol response.     # CKD: stable (baseline 1.0)     # History of hypertension   - PTA amlodipine 10mg daily  - PTA hydralazine 37.5mg daily  - PTA losartan 100mg daily   - PTA Crestor 5mg every other day      # History of Embolic CVA  - Held PTA Plavix 75mg daily prior to thoracentesis, resumed on 6/20      Diet: Combination Diet Low Saturated Fat Na <2400mg Diet, No Caffeine Diet  Fluid restriction 2000 ML FLUID  Fluids: PO  DVT Prophylaxis: Pneumatic Compression Devices, likely start lovenox tomorrow post procedure   Bobby Catheter: not present  Code Status: Full Code confirmed with patient 6/19/21    Shannan Eaton MD  Internal Medicine, PGY-1  Pager [phone]     Subjective:   Feeling \"pretty good: today. No chest pain, shortness breath. Feeling somewhat overwhelmed by diagnosis. Son at bedside. Denies any pain, significant shortness of breath. Good appetite. No other complaints.         Objective:    Temp:  [96.9  F (36.1  C)-98  F (36.7  C)] 97.1  F (36.2  C)  Pulse:  [86-96] 86  Resp:  [16] 16  BP: (127-150)/(64-78) 150/78  SpO2:  [93 %-95 %] 95 %    PHYSICAL EXAM:   General Appearance: alert, oriented, comfortable appearing, on oxygen by nasal cannula  Respiratory: clear to auscultation anteriorly, bilateral expiratory and inspiratory wheezes   Cardiovascular: no JVD while sitting up in chair, RRR with no murmurs  GI: soft, obese, non-tender to palpation, non-distended  Skin: no rashes on exposed skin   Musculoskeletal: no LE edema  Neurologic: " alert and oriented, CN 2-12 grossly intact    Data reviewed today: I reviewed all new lab and imaging results over the last 24 hours.

## 2021-06-24 NOTE — PROGRESS NOTES
Care Management Follow Up    Length of Stay (days): 5    Expected Discharge Date: 06/25/21(TCU)     Concerns to be Addressed: discharge planning     Patient plan of care discussed at interdisciplinary rounds: Yes    Anticipated Discharge Disposition: Skilled Nursing Facilty, Transitional Care     Anticipated Discharge Services: None  Anticipated Discharge DME: None    Patient/family educated on Medicare website which has current facility and service quality ratings: (Family already has facility in mind)  Education Provided on the Discharge Plan: Yes   Patient/Family in Agreement with the Plan: yes    Referrals Placed by CM/SW: Post Acute Facilities  Private pay costs discussed: transportation costs    Additional Information:  SW receivd update from pt's med team that pt is not ready to discharge to a TCU today. There is a planned goals of care discussion to happen later today. LVM for Piedad @ [facility], ph: [phone].    SW will continue to follow for discharge placement.     TOMER Wong, LICSW  [address]   [facility]  Phone: [phone]  Pager: [phone]

## 2021-06-24 NOTE — PLAN OF CARE
Assumed Cares: 2962-2343  Vital signs:  VSS on RA  Neuro: A&Ox4.   Behavior: Calm.  GI: No BM this shift.   : Purewick in place with adequate urine output.   Respiratory: Denies SOB  Cardiac: Denies chest pain.   Skin: No acute deficits noted.   Lines/Drains: PIV TKO  Mobility: Assist of 2  Pain: Denies pain.   Plan of care: Continue with current plan of care and update provider as needed.

## 2021-06-25 NOTE — PLAN OF CARE
Assume Cares [id number]. A&O VSS on RA. Denies pain.  Low fat, low sodium diet w/ 2L FR. L PIV saline locked in between abx infusion. Purewick in place with good output. Plan for PET scan on Saturday @ 1330. Continue with plan of care.

## 2021-06-25 NOTE — PROGRESS NOTES
Care Management Follow Up    Length of Stay (days): 6    Expected Discharge Date: 06/25/21     Concerns to be Addressed: discharge planning     Patient plan of care discussed at interdisciplinary rounds: Yes    Anticipated Discharge Disposition: Skilled Nursing Facilty, Transitional Care     Anticipated Discharge Services: None  Anticipated Discharge DME: None    Patient/family educated on Medicare website which has current facility and service quality ratings: (Family already has facility in mind)  Education Provided on the Discharge Plan:  yes  Patient/Family in Agreement with the Plan: yes    Referrals Placed by CM/SW: Post Acute Facilities    [address] [place]  [facility] [address]  [phone]       Private pay costs discussed: Not applicable    Additional Information:  Pt had a goals of care conference with new diagnosis. Per nursing, Pt has additional testing scheduled tomorrow for new plan of care. PEDRO paged medical team for further updates. PEDRO spoke with Piedad @ [facility], ph: [phone] for updates. PEDRO reported that at this time, Pt is not ready for discharge. TCU may no longer be a viable option due to complexity of Pt medical care/new diagnoses. PEDRO will continue to follow for psychosocial support, resources and advocate on behalf of the patient.    TOMER Gallo, [facility]  ICU    [facility]  Phone: [phone]  Pager: [phone]

## 2021-06-25 NOTE — PLAN OF CARE
"Blood pressure (!) 150/76, pulse 90, temperature 97.6  F (36.4  C), resp. rate 18, height 1.651 m (5' 5\"), weight 104.3 kg (230 lb), SpO2 98 %. RA.      Patient A & O X 4 no respiratory distress noted , denies pain , received scheduled medications as ordered . Patient with good appetite on 2L FR . Purewick in place with good urine output. Patient up in chair with AX 2 and gait belt, Pt require lift room. Skin reddened areas in groin area; interdry and barrier cream placed.. Call light within reach, bed alarm on and hourly round completed.     Plan NPO after MN for PET scan tomorrow. 6/26/21.    "

## 2021-06-25 NOTE — PLAN OF CARE
OT 5B: cancel, pt declining most activity today per discussion with physical therapy, will reschedule.

## 2021-06-25 NOTE — PROGRESS NOTES
[facility]  Progress Note - Maroon 3 Service   Date of Admission: 6/19/2021     Assessment and Plan:   Bárbara Vuong is a 86 year old female admitted on 6/19/2021. She has a history of carotid stenosis, embolic CVA, CKD 3, HTN presenting with acute on progressive dyspnea found to have acute hypoxic respiratory failure, wheezing with imaging revealing a large right pleural effusion, newly diagnosed with lung adenocarcinoma on 6/24 per cytology from pleurocentesis. Oncology consulted.    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 Diagnostic/therapeutic thoracentesis on 6/20, cytology consistent with adenocarcinoma.  - Patient and son informed of new diagnosis    - Seen by Oncology GERARD Galvan 6/25:    - Patient and family would like to opt for PET scan (scheduled 6/26)    - Pt and Son do not want to discuss results until Monday   - Oncology will not plan on seeing Ms. Vuong again until Monday   - Cont albuterol inhaler q6h 6/21  - Infectious workup after concern for SIRS: repeat CRP 28 trending up, and repeat lactate 1.6 trending down, procal pending <0.05 and repeat blood cultures 6/22 show NGTD. Likely infection overly malignancy.    - Cont Levofloxacin 750 mg for 5 days total  (6/22 - 27)    - Cont  Prednisone 40 mg for 5 days total (6/22-27 )     - SW consulted, family would prefer discharged to TCU given deconditioning      # Primary parathyroidism   # Mild hypercalcemia in setting of elevated PTH, possible malignant hypercalcemia   # 1.1cm hypodense thyroid nodule, left   PTH elevated at 296, ionized calcium wnl at 5.0. Incidental thyroid nodule 1.1 cm seen on CT chest on 6/19. Nodule present on thyroid US, possibly related to malignancy, per criteria too small  "to bx at this time. Could be further characterized with PET scan (6/26).   - no intervention at this time      # Weakness  Likely from hypoxia and respiratory concerns  - PT/OT recommending TCU, family in agreement   - Falls precaution      # Chronic hyponatremia:   Baseline 130-132 for the last 2 years, stable. Serum osmolality 278, urine osmolality 699, and urine sodium 72, concern for glucocorticoid deficiency. Cosyntropin stim test with appropriate cortisol response. Likely related to malignancy.  -Has been stable, no BMP collected today     # CKD: stable (baseline 1.0)     # History of hypertension   - PTA amlodipine 10mg daily  - PTA hydralazine 37.5mg daily  - PTA losartan 100mg daily   - PTA Crestor 5mg every other day      # History of Embolic CVA  - Held PTA Plavix 75mg daily prior to thoracentesis, resumed on 6/20      Diet: Combination Diet Low Saturated Fat Na <2400mg Diet, No Caffeine Diet  Fluid restriction 2000 ML FLUID  Fluids: PO  DVT Prophylaxis: Pneumatic Compression Devices, likely start lovenox tomorrow post procedure   Bobby Catheter: not present  Code Status: Full Code confirmed with patient 6/19/21    Shannan Eaton MD  Internal Medicine, PGY-1  Pager [phone]     Subjective:   Feeling \"good\" today. Slept well overnight, son at bedside. Denies any increased shortness of breath,chest pain, nausea, fever/chills, or further pain whatsoever. When asked if she though more about diagnosis of adenocarcinoma and if leaning towards treatment/comfort cares pt reports \"I don't know\" but reiterates she does not want to leave the house more than one day a week.      Objective:    Temp:  [96.2  F (35.7  C)-97.8  F (36.6  C)] 96.7  F (35.9  C)  Pulse:  [60-94] 85  Resp:  [16-18] 18  BP: (108-151)/(58-68) 151/68  SpO2:  [93 %-97 %] 94 %    PHYSICAL EXAM:   General Appearance: alert, oriented, comfortable appearing, eating breakfast   Respiratory: clear to auscultation anteriorly, bilateral expiratory " wheezes in bases   Cardiovascular: no JVD while sitting up in chair, RRR with no murmurs  GI: soft, obese, non-tender to palpation, non-distended  Skin: no rashes on exposed skin   Musculoskeletal: no LE edema  Neurologic: alert and oriented, CN 2-12 grossly intact    Data reviewed today: I reviewed all new lab and imaging results over the last 24 hours.

## 2021-06-25 NOTE — PLAN OF CARE
Pt is AO*4, denies pain and nausea. Breathing comfortably on RA. Pt refused assessment and albuterol overnight and wanted to be left alone to sleep. Pt is up with assist of 2. Purewick in place, no BM overnight. 2L FR. L PIV SL. Pt has PET scan scheduled on Saturday @ 1330. Follow plan of care.    Mirta Ly RN on 6/25/2021 at 7:33 AM

## 2021-06-25 NOTE — CONSULTS
"  Oncology  Consult Note   Date of Service: 06/25/2021    Patient: Bárbara Vuong  MRN: [id number]  Admission Date: 6/19/2021  Hospital Day # 6  Cancer Diagnosis: Possible Primary Lung Adenocarcinoma, with cells positive for malignancy on thoracentesis  Primary Outpatient Oncologist: LISA  Current Treatment Plan: TBD     Reason for Consult: \"new diagnosis of possible primary lung adenocarcinoma s/p thora x2, patient informed\"    Recommendations:   - Obtain PET scan, scheduled for 6/26, oncology will discuss results with patient and son in person Monday 6/28/21 per patient and son's request.   - Will continue to assist with further work up and treatment discussions with patient and family     Assessment & Plan:   Bárbara Vuong is a 86 year old female with PMH of carotid stenosis, embolic CVA, CKD 3, HTN who presented with worsening SOB found to have acute hypoxic respiratory failure in the ED. Found to have a large right pleural effusion, pleural fluid positive for malignancy    # Concern for Primary Lung Adenocarcinoma  # Recurrent malignant pleural R effusion     # Acute on chronic respiratory failure  Patient and son report worsening SOB over the last ~month.  Infectious workup (so far negative) per primary team, lactic trending down , procal low, NGTD with blood Cx. Underwent thoracentesis 6/20 with cytology positive for malignancy, immunoprofile consistent with adenocarcinoma likely lung primary.  - Repeat thoracentesis 6/23 w/ removal of 650mL (of note this was for repeat CT Chest not for symptomatic relief)   - PET scan and CT CAP scheduled for 6/26    # ECOG 2-3  - Appreciate PT/OT -- currently recommending TCU     We will continue to follow this patient. Please do not hesitate to page with any questions or concerns.    Patient was seen and plan of care was discussed with attending physician Dr. Sourav Venegas PA-C  Hematology/Oncology   Pager: [phone]      History of Present Illness:    Bárbara " "NIA Vuong is a 86 year old year old female with past medical history of carotid stenosis, embolic CVA, CKD 3, HTN who was admitted with progressive dyspnea and was found to have a large pleural effusion which was found to be malignant by cytology.     Estela is doing well today. She was seen with her son Sachin at bedside. Reviewed that we can not definitively say this is lung cancer only based off the cytology from pleural effusion. While this does raise concern we would need tissue biopsy for formal diagnosis. Encouraged patient to obtain PET scan as this could be helpful in seeing the extent of this disease, site of potential biopsy, and help with prognosis conversations. Patient and son express that quality of life is more important compared to quantity. Sachin raised concern that frequent \"pokes and trips to doctor's appointments\" may not align with his mom's goals. Discussed that there are options to potentially treat the cancer and limit frequent visits. Will continue to have ongoing discussions regarding if pursuing biopsy is in line with patient's goals and wishes.       Oncologic History:  No history of CA    Review of Systems:  A comprehensive ROS was performed and found to be negative or non-contributory with the exception of that noted in the HPI above.    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    IV access: Use right arm, prefers ultrasound with lidocaine     Lichen planus 2/17/2011     Rosacea 2/17/2011       Past Surgical History:  Past Surgical History:   Procedure Laterality Date     [facility] NONSPECIFIC PROCEDURE      back surgery     [facility] NONSPECIFIC PROCEDURE      elbow surgery     [facility] NONSPECIFIC PROCEDURE      hip " replacement, right     ZZC NONSPECIFIC PROCEDURE      fx elbow       Social History:  Social History     Socioeconomic History     Marital status:      Spouse name: None     Number of children: None     Years of education: None     Highest education level: None   Occupational History     None   Social Needs     Financial resource strain: None     Food insecurity     Worry: None     Inability: None     Transportation needs     Medical: None     Non-medical: None   Tobacco Use     Smoking status: Former Smoker     Packs/day: 1.00     Years: 7.00     Pack years: 7.00     Types: Cigarettes     Quit date: 10/26/1987     Years since quittin.6     Smokeless tobacco: Never Used   Substance and Sexual Activity     Alcohol use: Yes     Alcohol/week: 7.0 standard drinks     Comment: one drink per night     Drug use: No     Sexual activity: Not Currently     Partners: Male   Lifestyle     Physical activity     Days per week: None     Minutes per session: None     Stress: None   Relationships     Social connections     Talks on phone: None     Gets together: None     Attends Sikh service: None     Active member of club or organization: None     Attends meetings of clubs or organizations: None     Relationship status: None     Intimate partner violence     Fear of current or ex partner: None     Emotionally abused: None     Physically abused: None     Forced sexual activity: None   Other Topics Concern     Parent/sibling w/ CABG, MI or angioplasty before 65F 55M? Not Asked   Social History Narrative     None        Family History  Family History   Problem Relation Age of Onset     C.A.D. Mother      Hypertension Mother         AGE RELATED       Outpatient Medications:  No current facility-administered medications on file prior to encounter.   albuterol (PROAIR HFA/PROVENTIL HFA/VENTOLIN HFA) 108 (90 Base) MCG/ACT inhaler, Inhale 2 puffs into the lungs every 6 hours  amLODIPine (NORVASC) 10 MG tablet, Take 1  "tablet (10 mg) by mouth daily  Ascorbic Acid (VITAMIN C) 500 MG CAPS, Take 1 capsule by mouth daily  Cholecalciferol (VITAMIN D) 50 MCG (2000 UT) CAPS, Take 1 capsule by mouth daily  clopidogrel (PLAVIX) 75 MG tablet, TAKE 1 TABLET BY MOUTH ONCE DAILY  DIPHENHYDRAMINE HCL PO, Take by mouth nightly as needed for sleep  hydrALAZINE (APRESOLINE) 25 MG tablet, TAKE 1.5 TABLETS BY MOUTH ONCE DAILY,  losartan (COZAAR) 100 MG tablet, Take 1 tablet (100 mg) by mouth daily  rosuvastatin (CRESTOR) 5 MG tablet, TAKE A 1/2 TABLET (2.5MG) BY MOUTH EVERY EVENING (Patient taking differently: 5 mg TAKE 1 TABLET (5MG) BY MOUTH EVERY OTHER EVENING)         Physical Exam:    Blood pressure (!) 151/68, pulse 85, temperature 96.7  F (35.9  C), temperature source Oral, resp. rate 18, height 1.651 m (5' 5\"), weight 104.3 kg (230 lb), SpO2 94 %.    Constitutional: Pleasant female seen resting comfortably in bed in NAD. Alert and interactive.   HEENT: NCAT. PERRL, EOMI, anicteric sclera.   Hematologic / Lymphatic: No overt bleeding. No cervical or clavicular adenopathy.  Respiratory: Non-labored breathing, good air exchange, lungs clear to auscultation bilaterally. No cough or wheeze noted.   Cardiovascular: Regular rate and rhythm. No murmur or rub.   GI: Normoactive bowel sounds. Abdomen soft, non-distended, and non-tender. No palpable masses or organomegaly.  Skin: Warm and dry. No concerning lesions or rash on exposed surfaces.  Breast: No breast masses or abnormalities palpated bilaterally   Musculoskeletal: Extremities grossly normal, non-tender, no edema. Strong peripheral pulses. Good strength and ROM in bed.   Neuropsychiatric: Mentation and affect appear normal/appropriate.    Labs & Studies: I personally reviewed the following studies:  ROUTINE LABS (Last four results):  CMP  Recent Labs   Lab 06/24/21  0633 06/23/21  0451 06/22/21  1200 06/22/21  0601 06/21/21  0520 06/20/21  0613 06/19/21  1047 06/18/21  1420 06/18/21  1420 "   * 128*  --  128* 128* 129* 130*  --  131*   POTASSIUM 4.5 5.0  --  4.5 4.9 4.4 4.5  --  4.6   CHLORIDE 97 98  --  96 96 97 99  --  98   CO2 23 23  --  26 26 25 26  --  23   ANIONGAP 7 7  --  6 7 8 5  --  10   * 153*  --  98 91 142* 108*  --  122*   BUN 25 22  --  25 37* 22 17  --  19   CR 0.93 0.96  --  0.84 1.10* 1.02 0.93  --  1.07*   GFRESTIMATED 56* 54*  --  64 46* 50* 56*  --  48*   GFRESTBLACK 65 63  --  74 53* 58* 65  --  55*   RACHEL 9.6 9.4  --  9.8 9.8 10.3* 10.1  --  10.4*   MAG 2.2 2.3  --  2.4* 2.3 2.4* 2.2   < >  --    PHOS  --   --  2.5  --   --   --   --   --   --    PROTTOTAL 6.2*  --   --   --   --  7.6 7.2  --  7.4   ALBUMIN  --   --   --   --   --   --  3.4  --  3.5   BILITOTAL  --   --   --   --   --   --  0.4  --  0.4   ALKPHOS  --   --   --   --   --   --  91  --  89   AST  --   --   --   --   --   --  13  --  15   ALT  --   --   --   --   --   --  17  --  20    < > = values in this interval not displayed.     CBC  Recent Labs   Lab 06/24/21  0633 06/23/21  0451 06/22/21  0601 06/21/21  0520   WBC 8.2 6.7 10.0 10.3   RBC 5.03 5.09 5.23* 5.16   HGB 14.4 14.8 15.2 15.0   HCT 45.7 44.8 45.9 45.4   MCV 91 88 88 88   MCH 28.6 29.1 29.1 29.1   MCHC 31.5 33.0 33.1 33.0   RDW 13.9 13.9 14.0 14.0    350 379 368     INR  Recent Labs   Lab 06/20/21  0613   INR 1.10

## 2021-06-26 NOTE — PROVIDER NOTIFICATION
Provider notified (name): Dinora Andrew MD - kemi intern  Reason for notification:  Pt having possible mild allergic reaction to levaquin. Call back when possible.  Recommendation/request given to provider: Pause IV abx and order benadryl for itching  Response from provider: IV levaquin to be held tonight; one time order for benadryl

## 2021-06-26 NOTE — PROGRESS NOTES
[facility]  Progress Note - Maroon 3 Service   Date of Admission: 6/19/2021     Assessment and Plan:   Bárbara Vuong is a 86 year old female admitted on 6/19/2021. She has a history of carotid stenosis, embolic CVA, CKD 3, HTN presenting with acute on progressive dyspnea found to have acute hypoxic respiratory failure, wheezing with imaging revealing a large right pleural effusion, newly diagnosed with lung adenocarcinoma on 6/24 per cytology from pleurocentesis. Oncology consulted. Awaiting PET.     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 Diagnostic/therapeutic thoracentesis on 6/20, cytology consistent with adenocarcinoma.  - Patient and son informed of new diagnosis    - Seen by Oncology GERARD Galvan 6/25:    - Patient and family would like to opt for PET scan -scheduled today but was cancelled due to machine failure, possibly could get by Tuesday or 7/2 (will need to be NPO prior)    - Patient may go to TCU prior to PET depending on how late in week it occurs     - Cont albuterol inhaler q6h 6/21  - Infectious workup after concern for SIRS: repeat CRP 28 trending up, and repeat lactate 1.6 trending down, procal pending <0.05 and repeat blood cultures 6/22 show NGTD. Likely infection overly malignancy.    - Cont Levofloxacin 750 mg for 5 days total  (6/22 - 27)    - Cont  Prednisone 40 mg for 5 days total (6/22-27 )     - SW consulted, family would prefer discharged to TCU given deconditioning      # Primary parathyroidism   # Mild hypercalcemia in setting of elevated PTH, possible malignant hypercalcemia   # 1.1cm hypodense thyroid nodule, left   PTH elevated at 296, ionized calcium wnl at 5.0. Incidental thyroid nodule 1.1 cm seen on CT chest on 6/19. Nodule  "present on thyroid US, possibly related to malignancy, per criteria too small to bx at this time. Could be further characterized with PET scan (6/26).   - no intervention at this time      # Weakness  Likely from hypoxia and respiratory concerns  - PT/OT recommending TCU, family in agreement   - Falls precaution      # Chronic hyponatremia:   Baseline 130-132 for the last 2 years, stable. Serum osmolality 278, urine osmolality 699, and urine sodium 72, concern for glucocorticoid deficiency. Cosyntropin stim test with appropriate cortisol response. Likely related to malignancy.  -Has been stable, no BMP collected today     # CKD: stable (baseline 1.0)     # History of hypertension   - PTA amlodipine 10mg daily  - PTA hydralazine 37.5mg daily  - PTA losartan 100mg daily   - PTA Crestor 5mg every other day      # History of Embolic CVA  - Held PTA Plavix 75mg daily prior to thoracentesis, resumed on 6/20      Diet: Combination Diet Low Saturated Fat Na <2400mg Diet, No Caffeine Diet  Fluid restriction 2000 ML FLUID  Fluids: PO  DVT Prophylaxis: Pneumatic Compression Devices, likely start lovenox tomorrow post procedure   Bobby Catheter: not present  Code Status: Full Code confirmed with patient 6/19/21    Shannan Eaton MD  Internal Medicine, PGY-1  Pager [phone]     Subjective:     Feeling \"good\" today. Slept well overnight. Frustrated PET was cancelled. No pain ,shortness of breath, complaints at this time. Asked if could call son, and patient declined.      Objective:    Temp:  [95.1  F (35.1  C)-96.7  F (35.9  C)] 96.5  F (35.8  C)  Pulse:  [76-88] 84  Resp:  [18-20] 18  BP: (120-158)/(53-79) 123/65  SpO2:  [93 %-97 %] 94 %    PHYSICAL EXAM:   General Appearance: alert, oriented, comfortable appearing, eating breakfast   Respiratory: clear to auscultation anteriorly  Cardiovascular: , RRR with no murmurs  GI: soft, obese, non-tender to palpation, non-distended  Skin: no rashes on exposed skin "   Musculoskeletal: no LE edema  Neurologic: alert and oriented    Data reviewed today: I reviewed all new lab and imaging results over the last 24 hours.

## 2021-06-26 NOTE — PROVIDER NOTIFICATION
Paged [phone] Lyndsay GROVES via [facility]     LOW 5B 5-240 [address] MANZANO [id number] FYI: PET scan computer not working. PET rescheduled for 7/2 at 1415. will call Mon if can scan pt sooner. Can you order a reg diet for pt?    Paged correct service:M3 Dr. Eaton ([facility])    LOW 5B 5-240 [address] MANZANO [id number] FYI: PET scan computer not working. PET rescheduled for 7/2 at 1415. will call Mon if can scan pt sooner. Can you order a reg diet for pt?

## 2021-06-26 NOTE — PLAN OF CARE
"/70 (BP Location: Left arm)   Pulse 85   Temp 96.4  F (35.8  C) (Oral)   Resp 19   Ht 1.651 m (5' 5\")   Wt 104.3 kg (230 lb)   SpO2 96%   BMI 38.27 kg/m      Assumed Cares: 5750-4747  Neuro: A&O x 4  Respiratory: Denies SOB while at rest; LS diminished in bases but SpO2 stable on RA   Cardiac: WDL; denies chest pain  GI/: Purewick with good output; no BM; pt offered miralax but refused stating she would rather have it in the morning to avoid disrupting her sleep  Skin: Bruising to forearms; blanchable redness to perineum  Lines: R PIV TKO  Pain: Denies pain  Diet: Regular diet with 2 L FR; NPO starting at 0600 for PET scan scheduled for 1330  Activity Level: Heavy 2 assist with walker and gait belt  Significant Events: IV access lost while levaquin was infusing; IV flushed but no blood return noted; area reddened, slightly swollen, and pt complained of itching; IV pulled and pt stated relief; see prior provider notification note  Plan: Continue with POC; notify provider with changes in pt status     "

## 2021-06-26 NOTE — PLAN OF CARE
Assumed cares: [id number]    VS: AVSS  Neuros: AOX4  Cardiac: RRR, denies dizziness  Respiratory: SOB at rest, c/b GARCIA. Lungs diminished. Sating well on RA.   GI/: incontinent of B/B. Purewick in place-draining well. Last stool 6/24. Miralax given X1. No stool this shift.  Nutrition: Reg diet, with 2L FR  IV/Drains: R PIV TKO.   Activity: 2 assist with gait belt and walker. Up in chair most of day.   Pain: denies  Skin: blanchable redness on bottom and inside thighs-barrier cream applied.   Labs: asymptomatic COVID swab sent=negative. K+ & Mg2+ were not scheduled to be drawn. Labs ordered to be drawn per replacement protocol. Lab will come draw pt at 1900. Replace as needed. Will let oncoming RN know.     Plan of Care: transitioned to po levaquin today-ananth well. Plan for PET scan 7/2 at 14:15-PET will call Monday to possibly schedule exam for earlier date. Educated pt on IS use and cough and deep breathing to strengthen lungs.

## 2021-06-27 NOTE — PROVIDER NOTIFICATION
Provider notified (name): Dr Eaton  Reason for notification: critical lab: pleural fluid drawn 6/23 returned positive for gram variable bacilli in broth only.   Recommendation/request given to provider: none  Response from provider:  Awaiting response.

## 2021-06-27 NOTE — PROGRESS NOTES
[facility]  Progress Note - [place] 3 Service   Date of Admission: 6/19/2021     Assessment and Plan:   Bárbara Vuong is a 86 year old female admitted on 6/19/2021. 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 Oncology consulted. Awaiting PET (canceled on 6/27 to computer issue), may be able to get tomorrow. Also potential for Pleurex cath placement for reaccumulating effusion.     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 Diagnostic/therapeutic thoracentesis on 6/20, cytology consistent with adenocarcinoma. Also component of concomitant pneumonia, started on prednisone and Levaquin, 5 day course finished 6/26. Now determining next steps for treatment vs. hospice pending PET/further Onc input with more information. Patient is certain she does not want to return for repeat thoracenteses which she would likely need.     - Seen by Oncology GERARD Galvan 6/25:    - Possible PET if availability on Monday 6/28   - IR consult tomorrow for Pleurex cath placement, will plan on doing it tomorrow barring insurance issues. IR to call tomorrow 6/28 for scheduling.    - NPO after MN for both PET/Pleurex cath     - CXR tomorrow   - Continue albuterol     # History of hypertension   Recurrence of elevated BPs in AM.   - Discontinue PTA hydralazine 37.5mg daily- may be contributing to higher AM BPs   - Start Carvedilol 6.25mg BID   - PTA amlodipine 10mg daily  - PTA losartan 100mg daily      # Primary  "parathyroidism   # Mild hypercalcemia in setting of elevated PTH, possible malignant hypercalcemia   # 1.1cm hypodense thyroid nodule, left   PTH elevated at 296, ionized calcium wnl at 5.0. Incidental thyroid nodule 1.1 cm seen on CT chest on 6/19. Nodule present on thyroid US, possibly related to malignancy, per criteria too small to bx at this time. Could be further characterized with PET scan (6/26).   - no intervention at this time     # Weakness  Likely from hypoxia and respiratory concerns  - PT/OT recommending TCU, family in agreement. May be discharged to TCU prior to PET depending on timing, she is open to this.   - Falls precaution      # Chronic hyponatremia:   Baseline 130-132 for the last 2 years, stable. Serum osmolality 278, urine osmolality 699, and urine sodium 72, concern for glucocorticoid deficiency. Cosyntropin stim test with appropriate cortisol response. Likely related to malignancy.  -Has been stable, not rechecking labs at this time     #Hyperlipidemia  - PTA Crestor 5mg every other day - could consider discontinuing on discharge    # CKD: stable (baseline 1.0)    # History of Embolic CVA  - Held PTA Plavix 75mg daily prior to thoracentesis, resumed      Diet: Combination Diet Low Saturated Fat Na <2400mg Diet, No Caffeine Diet  Fluid restriction 2000 ML FLUID  Fluids: PO  DVT Prophylaxis: Pneumatic Compression Devices, likely start lovenox tomorrow post procedure   Bobby Catheter: not present  Code Status: Full Code confirmed with patient 6/19/21    Shannan Eaton MD  Internal Medicine, PGY-1  Pager [phone]     Subjective:     No events overnight.     Feeling \"fine\" this morning, enjoying her movie. Denies any chest pain, shortness of breath, abdominal pain, constipation, urinary issues. Prefers we do  not update Sachin this weekend as he is out of town.      Objective:    Temp:  [96.3  F (35.7  C)-96.8  F (36  C)] 96.3  F (35.7  C)  Pulse:  [83-91] 91  Resp:  [18-20] 20  BP: " (111-169)/(53-88) 164/72  SpO2:  [94 %-97 %] 94 %    PHYSICAL EXAM:  General Appearance: alert, oriented, comfortable appearing  Respiratory: clear to auscultation anteriorly, no wheezing/crackles   Cardiovascular: RRR with no murmurs  GI: soft, obese, non-tender to palpation, non-distended  Skin: no rashes on exposed skin   Musculoskeletal: no LE edema  Neurologic: alert and oriented    Data reviewed today: I reviewed all new lab and imaging results over the last 24 hours.

## 2021-06-27 NOTE — PLAN OF CARE
"Blood pressure 127/54, pulse 86, temperature 97.2  F (36.2  C), temperature source Oral, resp. rate 18, height 1.651 m (5' 5\"), weight 105.5 kg (232 lb 9.4 oz), SpO2 96 %. RA.      Patient A & O X 4, VSS no respiratory distress noted , denies pain. Patient received scheduled medications as ordered. Appetite good , Purewick patent with good urine output . No bowel movement on this shift offered Miralax and Pt declined . Patient likes to lying in her back declined with prepositions, coccyx and groin  reddened , applied barrier cream Patient educated the importance with reposition, to prevent pressure sores, Pt reply \"I\" understand.  Call light within reach bed alarm on continue monitor closely and update MD with concerns.      "

## 2021-06-27 NOTE — PLAN OF CARE
"BP (!) 164/72 (BP Location: Left arm)   Pulse 91   Temp 96.3  F (35.7  C) (Oral)   Resp 20   Ht 1.651 m (5' 5\")   Wt 105.5 kg (232 lb 9.4 oz)   SpO2 94%   BMI 38.70 kg/m      Assumed Cares: 2267-6174  Neuro: A&O x 4  Respiratory: Denies SOB while at rest; LS diminished in bases but SpO2 stable on RA   Cardiac: WDL; denies chest pain  GI/: Purewick with good output; given miralax in prior shift but no BM; pt refused further medical intervention over night to avoid disrupting sleep  Skin: Bruising to forearms; blanchable redness to perineum and coccyx; heels elevated but pt refusing T&R q 2  Lines: R PIV TKO  Pain: Denies pain  Diet: Regular diet with 2 L FR  Activity Level: Heavy 2 assist with walker and gait belt  Significant Events: No significant events noted  Plan: Continue with POC; notify provider with changes in pt status    "

## 2021-06-28 NOTE — PLAN OF CARE
"BP (!) 158/71 (BP Location: Left arm)   Pulse 85   Temp 96.1  F (35.6  C) (Oral)   Resp 20   Ht 1.651 m (5' 5\")   Wt 107.7 kg (237 lb 7 oz)   SpO2 95%   BMI 39.51 kg/m       Assumed Cares: 3503-7849  Neuro: A&O x 4  Respiratory: Denies SOB while at rest; SpO2 stable on RA   Cardiac: WDL; denies chest pain  GI/: Purewick with good output; no BM; pt prefers to not take laxatives prior to bed to promote sleep  Skin: Bruising to forearms; blanchable redness to perineum and coccyx; heels elevated but pt refusing T&R q 2hr  Lines: R PIV TKO  Pain: Denies pain  Diet: NPO after MN  Activity Level: Heavy 2 assist with walker and gait belt  Significant Events: No significant events noted  Plan: Continue with POC; notify provider with changes in pt status; possible PET scan with pleurex cath placed today  "

## 2021-06-28 NOTE — PLAN OF CARE
"Blood pressure 122/65, pulse 87, temperature 96.2  F (35.7  C), temperature source Oral, resp. rate 18, height 1.651 m (5' 5\"), weight 107.7 kg (237 lb 7 oz), SpO2 96 %. RA.      Patient A & O X 4, VSS no respiratory distress noted denies pain . Patient had PET scan and CT of abdominal.  done . Received scheduled medications . Up in chair with A x 2 . Pure wick in place with good urine output coccyx and groin reddened , barrier cream applied. Call light within reach. Continue monitor closely and update MD with change.     "

## 2021-06-28 NOTE — PROGRESS NOTES
[facility]  Progress Note - [place] 3 Service   Date of Admission: 6/19/2021     Assessment and Plan:   Bárbara Vuong is a 86 year old female admitted on 6/19/2021. 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 Oncology consulted. PET 6/28 . Also potential for Pleurex cath placement for reaccumulating effusion.     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 Diagnostic/therapeutic thoracentesis on 6/20, cytology consistent with adenocarcinoma. Also component of concomitant pneumonia, started on prednisone and Levaquin, 5 day course finished 6/26. Now determining next steps for treatment vs. hospice pending PET/further Onc input with more information. Patient is certain she does not want to return for repeat thoracenteses which she would likely need.     -Oncology follow   - PET today at noon     - Interventional Pulm consulted- would be willing to place pleurex cath, possible as early as 6/29   - NPO for PET    - CXR today      - Will Call son Sachin today and update with potential plan   - Continue albuterol   -Will need placement for TCU    # History of hypertension   Recurrence of elevated BPs in AM, blood pressures fluctuate throughout the day.   - Discontinue PTA hydralazine 37.5mg daily- may be contributing to higher AM BPs   - Carvedilol 6.25mg BID   - PTA amlodipine 10mg daily  - PTA losartan 100mg daily      # Primary parathyroidism   # Mild hypercalcemia in setting of elevated PTH,  "possible malignant hypercalcemia   # 1.1cm hypodense thyroid nodule, left   PTH elevated at 296, ionized calcium wnl at 5.0. Incidental thyroid nodule 1.1 cm seen on CT chest on 6/19. Nodule present on thyroid US, possibly related to malignancy, per criteria too small to bx at this time. Could be further characterized with PET scan (6/28).   - no intervention at this time     # Weakness  Likely from hypoxia and respiratory concerns  - PT/OT recommending TCU, family in agreement. May be discharged to TCU prior to PET depending on timing, she is open to this.   - Falls precaution      # Chronic hyponatremia:   Baseline 130-132 for the last 2 years, stable. Serum osmolality 278, urine osmolality 699, and urine sodium 72, concern for glucocorticoid deficiency. Cosyntropin stim test with appropriate cortisol response. Likely related to malignancy.  -Has been stable, not rechecking labs at this time     #Hyperlipidemia  - PTA Crestor 5mg every other day - could consider discontinuing on discharge    # CKD: stable (baseline 1.0)    # History of Embolic CVA  - Held PTA Plavix 75mg daily prior to thoracentesis, resumed      Diet: Combination Diet Low Saturated Fat Na <2400mg Diet, No Caffeine Diet  Fluid restriction 2000 ML FLUID  Fluids: PO  DVT Prophylaxis: Pneumatic Compression Devices, likely start lovenox tomorrow post procedure   Bobby Catheter: not present  Code Status: Full Code confirmed with patient 6/19/21    Shannan Eaton MD  Internal Medicine, PGY-1  Pager [phone]     Subjective:     No events overnight.   Feeling \"good\" this morning, no pain, shortness of breath. Only complaint is she is hungry/thirsty from being NPO. Son Sachin \"may be\" returning today. She does not feel he needs to be updated at this time.      Objective:    Temp:  [96.1  F (35.6  C)-98.5  F (36.9  C)] 96.1  F (35.6  C)  Pulse:  [66-89] 85  Resp:  [16-22] 20  BP: (103-158)/(49-71) 158/71  SpO2:  [94 %-96 %] 95 %    PHYSICAL " EXAM:  General Appearance: alert, oriented, comfortable appearing  Respiratory: clear to auscultation anteriorly, no wheezing/crackles   Cardiovascular: RRR with no murmurs  GI: soft, obese, non-tender to palpation, non-distended  Skin: no rashes on exposed skin   Musculoskeletal: no LE edema  Neurologic: alert and oriented    Data reviewed today: I reviewed all new lab and imaging results over the last 24 hours.

## 2021-06-28 NOTE — PROGRESS NOTES
"CLINICAL NUTRITION SERVICES - ASSESSMENT NOTE     Nutrition Prescription    Malnutrition Status:    Patient does not meet two of the established criteria necessary for diagnosing malnutrition    Future/Additional Recommendations:  Monitor wt trends, PO adequacy     REASON FOR ASSESSMENT  Bárbara Vuong is a/an 86 year old female assessed by the dietitian for LOS    NUTRITION HISTORY  Not previously known to Clinical Nutrition. No food allergies noted. Pt reports no nutrition concerns PTA or currently - visit kept brief as another service needed to work with patient.     CURRENT NUTRITION ORDERS  Diet: Regular   Intake/Tolerance: % meals per RN flowsheets. She shares she misses some of the menu items like the stir macario which are no longer available on the new hospital menu. She feels she's eating adequately, has a good appetite, and cites that she hasn't lost any weight during her stay. No nutrition concerns.    LABS  Labs reviewed  -No BMP since 6/24  -K+ 4.7 (WNL) - 6/27  -Mg++ 2.2 (WNL) - 6/27    MEDICATIONS  Medications reviewed  -Vitamin D3  -Ascorbic acid 500 mg    ANTHROPOMETRICS  Height: 165.1 cm (5' 5\")  Most Recent Weight: 107.7 kg (237 lb 7 oz)    IBW: 56.8 kg   BMI: 37.71 kg/m2; Obesity Grade II BMI 35-39.9  Weight History: ~3.8% wt loss 3 months PTA - not clinically significant. Pt denies wt loss.   Wt Readings from Last 20 Encounters:   06/27/21 107.7 kg (237 lb 7 oz)   06/25/21 103.4 kg (228 lb)   03/12/21 107.5 kg (237 lb)   08/05/19 111.7 kg (246 lb 4.8 oz)   08/02/18 109.5 kg (241 lb 6.4 oz)   06/01/17 106.6 kg (235 lb)   04/06/16 96.2 kg (212 lb)   01/01/16 104.3 kg (230 lb)   09/30/15 94.6 kg (208 lb 9.6 oz)   08/08/15 102.4 kg (225 lb 12.8 oz)   08/06/15 110.8 kg (244 lb 4.3 oz)   08/06/15 99.8 kg (220 lb)   08/04/15 99.8 kg (220 lb)   06/30/15 101.2 kg (223 lb)   05/05/15 103 kg (227 lb)   05/27/14 101.6 kg (224 lb)   02/18/14 102.2 kg (225 lb 6 oz)   05/02/13 100.2 kg (221 lb)   12/11/12 " 101.4 kg (223 lb 8 oz)   01/16/12 102.5 kg (226 lb)   Wts this admit: Wt fluctuations noted this admit - suspect fluid-related.  06/27/21 1508 107.7 kg (237 lb 7 oz)   06/26/21 1658 105.5 kg (232 lb 9.4 oz)   06/24/21 2208 104.3 kg (230 lb)   06/23/21 1755 105.1 kg (231 lb 12.8 oz)   06/22/21 1530 105.2 kg (231 lb 14.4 oz)   06/21/21 1700 105.2 kg (231 lb 14.4 oz)   06/20/21 1442 105.2 kg (232 lb)   06/19/21 1902 102.8 kg (226 lb 10.1 oz)   06/19/21 0904 105.2 kg (232 lb)   Dosing Weight: 68 kg (adjusted, based on lowest wt this admit of 102.8 kg on 6/19 and IBW of 56.8 kg)    ASSESSED NUTRITION NEEDS  Estimated Energy Needs: 1977-7831 kcals/day (25 - 30 kcals/kg)  Justification: Maintenance  Estimated Protein Needs: 102-136 grams protein/day (1.5 - 2 grams of pro/kg)  Justification: Hypercatabolism with critical illness  Estimated Fluid Needs: 1 mL/kcal  Justification: Maintenance or Per provider pending fluid status    PHYSICAL FINDINGS  See malnutrition section below.    MALNUTRITION  % Intake: Decreased intake does not meet criteria  % Weight Loss: Weight loss does not meet criteria  Subcutaneous Fat Loss: None observed  Muscle Loss: None observed  Fluid Accumulation/Edema: Does not meet criteria  Malnutrition Diagnosis: Patient does not meet two of the established criteria necessary for diagnosing malnutrition    NUTRITION DIAGNOSIS  Predicted inadequate oral intake (protein/kcal) related to potential for PO to decline with prolonged LOS      INTERVENTIONS  Implementation  -Nutrition Education: Provided education on RD role.    -Medical food supplement therapy - Pt declined, says she is eating plenty (visitor at bedside corroborates) and doesn't want to gain weight with nutrition supplements.     Goals  Patient to consume % of nutritionally adequate meal trays TID, or the equivalent with supplements/snacks.     Monitoring/Evaluation  Progress toward goals will be monitored and evaluated per  protocol.    Crystal Helms RD, LD  Pager: [phone]

## 2021-06-28 NOTE — CONSULTS
Interventional Pulmonology          Initial Inpatient Consultation Note                                     June 28, 2021            Patient: Bárbara Vuong    Date of Admission: 6/19/2021  Reason for Consultation: Request for R pleurx placement  Requesting Physician: No referring provider defined for this encounter.      Assessment and Recommendation:   Bárbara Vuong is a 86 year old female with history of CVA and HTN who was admitted on 6/19/2021 with acute hypoxic respiratory failure and found to have large R pleural effusion. She has subsequently found to have adenocarcinoma from R pleural fluid.  Interventional Pulmonology is consulted today for placement of R pleurx catheter.    Recurrent R malignant effusion  New diagnosis of metastatic adenocarcinoma  Discussed with patient at bedside today regarding pleurx catheter and she is interested in proceeding. PET scan done today shows RUL mass as likely primary for her newly diagnosed adenocarcinoma along with moderate right sided pleural effusion.   --Will plan on R pleurx placement tomorrow morning if adequate fluid pocket can be found  --Please keep NPO after midnight.     Patient seen and discussed with Dr. Jairon Ramirez  Interventional Pulmonary Fellow  [phone]             Chief Complaint: Recurrent right pleural effusion    History of Present Illness:   Bárbara Vuong is a 86 year old female with history of CVA and HTN who was admitted on 6/19/2021 with acute hypoxic respiratory failure and found to have large R pleural effusion. She has subsequently found to have adenocarcinoma from R pleural fluid.  Interventional Pulmonology is consulted today for placement of R pleurx catheter.    Patient was admitted on 6/19/21 with weakness and dyspnea on exertion. Was found to be hypoxic and had large R pleural effusion. She underwent thoracentesis x 2 during this admission and was found to have metastatic adenocarcinoma in her pleural fluid. She  and her family are trying to decide whether to pursue hospice or not pending further imaging studies.          Data:   All pertinent laboratory and imaging data reviewed.      WBC 8.2 Hgb 14.4 Plt 334  Na 127 Cr 0.93    PET scan  1. 6.4 x 3.0 cm hypermetabolic mass in the right upper lobe,  consistent with patient's primary malignancy.  2. Multiple enlarged hypermetabolic right hilar and mediastinal lymph  nodes, concerning for metastasis.  3. Hypermetabolic left adrenal nodule, concerning for metastasis.  4. Scattered hypermetabolic osseous metastasis in the axial and  appendicular skeleton, predominantly involving the pelvis and spine.  5. Moderate right pleural effusion, increased in size since 6/23/2021.  6. Please see the separately dictated report for results of the high  resolution PET/CT of the neck.         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     Rosacea 2/17/2011            Past Surgical History:     Past Surgical History:   Procedure Laterality Date     ZZC NONSPECIFIC PROCEDURE      back surgery     ZZC NONSPECIFIC PROCEDURE      elbow surgery     ZZC NONSPECIFIC PROCEDURE      hip replacement, right     ZZC NONSPECIFIC PROCEDURE      fx elbow            Social History:     Social History     Socioeconomic History     Marital status:      Spouse name: Not on file     Number of children: Not on file     Years of education: Not on file     Highest education level: Not on file   Occupational History     Not on file   Social Needs     Financial resource strain: Not on file     Food insecurity     Worry: Not on file      Inability: Not on file     Transportation needs     Medical: Not on file     Non-medical: Not on file   Tobacco Use     Smoking status: Former Smoker     Packs/day: 1.00     Years: 7.00     Pack years: 7.00     Types: Cigarettes     Quit date: 10/26/1987     Years since quittin.6     Smokeless tobacco: Never Used   Substance and Sexual Activity     Alcohol use: Yes     Alcohol/week: 7.0 standard drinks     Comment: one drink per night     Drug use: No     Sexual activity: Not Currently     Partners: Male   Lifestyle     Physical activity     Days per week: Not on file     Minutes per session: Not on file     Stress: Not on file   Relationships     Social connections     Talks on phone: Not on file     Gets together: Not on file     Attends Synagogue service: Not on file     Active member of club or organization: Not on file     Attends meetings of clubs or organizations: Not on file     Relationship status: Not on file     Intimate partner violence     Fear of current or ex partner: Not on file     Emotionally abused: Not on file     Physically abused: Not on file     Forced sexual activity: Not on file   Other Topics Concern     Parent/sibling w/ CABG, MI or angioplasty before 65F 55M? Not Asked   Social History Narrative     Not on file            Family History:     Family History   Problem Relation Age of Onset     C.A.D. Mother      Hypertension Mother         AGE RELATED            Allergies:     Allergies   Allergen Reactions     Penicillins      Melatonin      Keeps awake              Medications:       albuterol  2 puff Inhalation Q6H [facility]     amLODIPine  10 mg Oral Daily     carvedilol  6.25 mg Oral BID w/meals     [Held by provider] clopidogrel  75 mg Oral Daily     lidocaine  1 patch Transdermal Q24H     lidocaine   Transdermal Q8H     losartan  100 mg Oral Daily     melatonin  1 mg Oral At Bedtime     rosuvastatin  5 mg Oral Q48H     sodium chloride (PF)  3 mL Intracatheter Q8H     vitamin C  500 mg  Oral Daily     Vitamin D3  50 mcg Oral Daily            Review of Systems:   A 12 point review of systems is negative aside for as noted above         Physical Exam:   Temp:  [96.1  F (35.6  C)-98.5  F (36.9  C)] 96.2  F (35.7  C)  Pulse:  [66-87] 87  Resp:  [16-22] 18  BP: (103-158)/(49-71) 122/65  SpO2:  [94 %-96 %] 96 %  General: Awake, alert and in no apparent distress   EENT: No scleral icterus  Neck: Trachea supple/midline  Lungs: Breathing comfortably on RA  Abdomen: Soft, non-tender, non-distended   MSK: No edema, no clubbing   Neurologic: Answering questions appropriately  Skin: No obvious rash  Psych: Normal affect

## 2021-06-28 NOTE — CONSULTS
"    Interventional Radiology Consult Service Note  Patient is consulted for a Right chest pleurX placement.     Case discussed with IR attending Dr. Sade Murillo and Kenneth Pena NP..     This is a 86-year-old female with history of carotid stenosis, embolic CVA, hypertension, chronic kidney disease stage III admitted 2021 with large right pleural effusion status post diagnostic and therapeutic thoracentesis on 2021 open (cytology consistent with malignancy and newly diagnosed lung adenocarcinoma) and bedside thoracentesis on  with removal of 650 mL of brown/yellow-colored fluid now requested for right chest Pleurx placement.    Discussed with team and as the patient is currently not on hospice with unclear rate of right malignant right pleural fluid re-accumulation (only 2 thoracenteses), IR will defer placement of right pleurX at this time.      Recommend Interventional Pulmonology consultation for ongoing management of pleural effusion.    Pertinent Imagin2021:  CT chest:  IMPRESSION:   1. Significant decrease in right pleural effusion status post  thoracentesis with small residual. Residual opacity along the right  major fissure posteriorly, likely some loculated fluid with associated  atelectasis/consolidation. Right peribronchial opacity atelectasis  versus infection.  2. Redemonstration of peripheral reticular opacities likely relates to  early changes of fibrotic process.    Expected date of discharge: TBD    Vitals:   BP (!) 158/71 (BP Location: Left arm)   Pulse 85   Temp 96.1  F (35.6  C) (Oral)   Resp 20   Ht 1.651 m (5' 5\")   Wt 107.7 kg (237 lb 7 oz)   SpO2 95%   BMI 39.51 kg/m      Pertinent Labs:     Lab Results   Component Value Date    WBC 8.2 2021    WBC 6.7 2021    WBC 10.0 2021     Lab Results   Component Value Date    HGB 14.4 2021    HGB 14.8 2021    HGB 15.2 2021     Lab Results   Component Value Date     " 06/24/2021     06/23/2021     06/22/2021     Lab Results   Component Value Date    INR 1.10 06/20/2021    PTT 25 08/06/2015     Lab Results   Component Value Date    POTASSIUM 4.7 06/27/2021        COVID-19 Antibody Results, Testing for Immunity    COVID-19 Antibody Results, Testing for Immunity   No data to display.         COVID-19 PCR Results    COVID-19 PCR Results 6/19/21 6/26/21   SARS-CoV-2 Virus Specimen Source Nasopharyngeal Nasopharyngeal   SARS-CoV-2 PCR Result NEGATIVE NEGATIVE      Comments are available for some flowsheets but are not being displayed.           Hanna Lyle PA-C  Interventional Radiology  Pager: [phone]

## 2021-06-28 NOTE — PROGRESS NOTES
"        Hematology / Oncology  Daily Progress Note   Date of Service: 06/28/2021  Patient: Bárbara Vuong  MRN: [id number]  Admission Date: 6/19/2021  Hospital Day # 9  Cancer Diagnosis: Possible Primary Lung Adenocarcinoma, with cells positive for malignancy on thoracentesis  Primary Outpatient Oncologist: Not established   Current Treatment Plan: TBD     Recommendations:   - Updated patient and son regarding PET scan completed today. Informed them that there is no \"easy lymph nodes\" for biopsy. If we were going to proceed with further work-up for cancer diagnosis it would likely need to be a biopsy of one of the bone lesions. Patient seemed uninterested in this. Son (Sachin) asked further about what treatment might entail and how much additional likely months would his mother gain from treatment. Unfortunately without biopsy and further information ( i.e PD-L1 expression) this is hard to answer. Patient's son requested Care Conference to be arranged with primary team to discuss if further work up is desired.   - Care conference planned for noon on 6/29 with primary team, and patient's family.     Assessment & Plan:   Bárbara Vuong is a 86 year old female with PMH of carotid stenosis, embolic CVA, CKD 3, HTN who presented with worsening SOB found to have acute hypoxic respiratory failure in the ED. Found to have a large right pleural effusion, pleural fluid positive for malignancy     # Concern for Primary Lung Adenocarcinoma  # Recurrent malignant pleural R effusion     # Acute on chronic respiratory failure  Patient and son report worsening SOB over the last ~month.  Infectious workup (so far negative) per primary team, lactic trending down , procal low, NGTD with blood Cx. Underwent thoracentesis 6/20 with cytology positive for malignancy, immunoprofile consistent with adenocarcinoma likely lung primary.  - Repeat thoracentesis 6/23 w/ removal of 650mL (of note this was for repeat CT Chest not for " "symptomatic relief)   - PET/ CT completed 6/28 showed 6.4 x 3.0 cm hypermetabolic mass in the RUL. Multiple enlarged hypermetabolic right hilar and mediastinal lymph nodes, concerning for metastasis. Hypermetabolic L adrenal nodule concerning for metastasis. Scattered hypermetabolic osseous metastasis in the axial and appendicular skeleton, predominantly involving the pelvis. Moderated right sided pleural effusion.      # ECOG 2-3  - Appreciate PT/OT -- currently recommending TCU      We will continue to follow this patient. Please do not hesitate to page with any questions or concerns.     Patient was seen and plan of care was discussed with attending physician Dr. Sourav Venegas PA-C  Hematology/Oncology   Pager: #[phone]  ___________________________________________________________________    Subjective & Interval History:    No acute events noted overnight. Estela is doing well. She has not had any SOB or difficulty breathing. Was up sitting in a chair eating lunch.     A comprehensive review of systems was obtained and is negative other than noted here or in the HPI.       Physical Exam:    Blood pressure 122/65, pulse 87, temperature 96.2  F (35.7  C), temperature source Oral, resp. rate 18, height 1.651 m (5' 5\"), weight 104.4 kg (230 lb 2.6 oz), SpO2 96 %.    General: lying in bed, no acute distress  HEENT: sclera anicteric, EOMI, MMM  Resp: Breathing comfortably on RA   MSK: warm and well-perfused, normal tone  Skin: no rashes on limited exam, no jaundice  Neuro: Alert and interactive, moves all extremities equally, no focal deficits    Labs & Studies: I personally reviewed the following studies:  ROUTINE LABS (Last four results):  CMP  Recent Labs   Lab 06/27/21  0529 06/26/21  1939 06/24/21  0633 06/23/21  0451 06/22/21  1200 06/22/21  0601   NA  --   --  127* 128*  --  128*   POTASSIUM 4.7 5.1 4.5 5.0  --  4.5   CHLORIDE  --   --  97 98  --  96   CO2  --   --  23 23  --  26   ANIONGAP  --   " --  7 7  --  6   GLC  --   --  102* 153*  --  98   BUN  --   --  25 22  --  25   CR  --   --  0.93 0.96  --  0.84   GFRESTIMATED  --   --  56* 54*  --  64   GFRESTBLACK  --   --  65 63  --  74   RACHEL  --   --  9.6 9.4  --  9.8   MAG 2.2 2.2 2.2 2.3  --  2.4*   PHOS  --   --   --   --  2.5  --    PROTTOTAL  --   --  6.2*  --   --   --      CBC  Recent Labs   Lab 06/24/21  0633 06/23/21  0451 06/22/21  0601   WBC 8.2 6.7 10.0   RBC 5.03 5.09 5.23*   HGB 14.4 14.8 15.2   HCT 45.7 44.8 45.9   MCV 91 88 88   MCH 28.6 29.1 29.1   MCHC 31.5 33.0 33.1   RDW 13.9 13.9 14.0    350 379     INRNo lab results found in last 7 days.    Medications list for reference:  Current Facility-Administered Medications   Medication     albuterol (PROAIR HFA/PROVENTIL HFA/VENTOLIN HFA) 108 (90 Base) MCG/ACT inhaler 2 puff     amLODIPine (NORVASC) tablet 10 mg     carvedilol (COREG) tablet 6.25 mg     [Held by provider] clopidogrel (PLAVIX) tablet 75 mg     glucose gel 15-30 g    Or     dextrose 50 % injection 25-50 mL    Or     glucagon injection 1 mg     Lidocaine (LIDOCARE) 4 % Patch 1 patch     lidocaine (LMX4) cream     lidocaine 1 % 0.1-1 mL     lidocaine patch in PLACE     losartan (COZAAR) tablet 100 mg     melatonin tablet 1 mg     polyethylene glycol (MIRALAX) Packet 17 g     rosuvastatin (CRESTOR) tablet 5 mg     sodium chloride (PF) 0.9% PF flush 3 mL     sodium chloride (PF) 0.9% PF flush 3 mL     vitamin C (ASCORBIC ACID) tablet 500 mg     Vitamin D3 (CHOLECALCIFEROL) tablet 50 mcg

## 2021-06-28 NOTE — PLAN OF CARE
OT 5A. Per discussion with RN, Pt with PET scan this date during set OT time this AM. OT to check back as schedule allows. Thank you.

## 2021-06-29 NOTE — PROGRESS NOTES
Care Management Follow Up    Length of Stay (days): 10    Expected Discharge Date: 07/01/21     Concerns to be Addressed: discharge planning     Patient plan of care discussed at interdisciplinary rounds: Yes    Anticipated Discharge Disposition: Skilled Nursing Facilty, Transitional Care     Anticipated Discharge Services: None  Anticipated Discharge DME: None    Patient/family educated on Medicare website which has current facility and service quality ratings: (Family already has facility in mind)  Education Provided on the Discharge Plan: Yes  Patient/Family in Agreement with the Plan: yes    Referrals Placed by CM/SW: Post Acute Facilities    [facility]  [address] [place] [id number]  Ph: [phone]  F: [phone]  6/29- referral sent     [facility]  [address] [place] [address]  Ph: [phone]  F: [phone]  6/29- referral sent     Private pay costs discussed: transportation costs    Additional Information:  SW following for TCU placement. Received message from [facility] 3 team that pt is now medically ready for discharge. Spoke with Jacquelyn @ [facility] admissions, who informed writer that they have no available beds until next Tuesday 7/6. SW will follow up with pt/family to get additional TCU options.     Addendum 1600  Spoke with pt's son Sachin, provided update on [facility]. He requested additional referrals be made to [facility] and [facility]. Referrals sent, SW will continue to coordinate pt's discharge.     TOMER Wong, [facility]  [address]   [facility]  Phone: [phone]  Pager: [phone]

## 2021-06-29 NOTE — PROGRESS NOTES
Patient underwent tunneled pleural catheter placement (Right chest) utilizing a 15.5 Fr. pleurX catheter.  LOT #[id number].  850  ml of blood-tinged fluid drained and site was dressed with sterile dressing.    Detailed written orders and patient insurance information for drainage kits have been completed and faxed to [employer], ATTN: Nette Johnson.  [employer] Customer Service can be reached at [phone].    Patient received a pleurX starter kit containing 4 drainage bottles and dressing packs.  This box is to be sent with patient upon hospital discharge.    Hospital staff may obtain pleurX drainage bottles with dressing packs from central supply for drainage while in-patient.      Order for patient learning center has been placed in epic      Beatrice Anaya, RRT, RCP  Interventional Respiratory Care Specialist  Endoscopy Department  Interventional Pulmonology  Respiratory Care Services  [facility]   [phone] office  [phone] pager  [email or web address]

## 2021-06-29 NOTE — PROCEDURES
INTERVENTIONAL PULMONOLOGY     Procedure(s):    Chest ultrasound and interpretation (right chest)  Tunnelled pleural catheter placement (right chest)    Indication:  Malignant pleural effusion    Attending of Record:  El De La O MD     Interventional Pulmonary Fellow   Carlita Ramirez MD     Trainees Present:   None     Medications:    10 ml 1% lidocaine  1 mg versed IV  50 mcg fentanyl IV    Sedation Time  Total sedation time was Choose Duration: 20 minutes of continuous bedside 1:1 monitoring.    Time Out:  Performed    The patient's medical record has been reviewed.  The indication for the procedure was reviewed.  The necessary history and physical examination was performed and reviewed.  The risks, benefits and alternatives of the procedure were discussed with the the patient in detail and she had the opportunity to ask questions.  I discussed in particular the potential complications including risks of minor or life-threatening bleeding and/or infection, respiratory failure, vocal cord trauma / paralysis, pneumothorax, and discomfort. Sedation risks were also discussed including abnormal heart rhythms, low blood pressure, and respiratory failure. All questions were answered to the best of my ability.  Verbal and written informed consent was obtained.  The proposed procedure and the patient's identification were verified prior to the procedure by the physician and the nurse, respiratory therapist, technician, resident physician (resident / fellow).    The patient was assessed for the adequacy for the procedure and to receive medications.   Mental Status:  Alert and oriented x 3  Airway examination:  Class I (Complete visualization of soft palate)  Pulmonary:  Clear to ausculation bilaterally  CV:  RRR, no murmurs or gallops  ASA Grade:  (I)  Normal healthy patient    After clinical evaluation and reviewing the indication, risks, alternatives and benefits of the procedure the patient was deemed to be in  satisfactory condition to undergo the procedure.      A Tuberculosis risk assessment was performed:  The patient has no known RISK of Tuberculosis    The procedure was performed in a negative airflow room: Yes    Maneuvers / Procedure:      Chest ultrasound: The Right side was ultasounded and demonstrated pleural fluid. The diaphragm, heart and lung tissue were clearly visualized. Other findings include moderate pleural effusion seen. Diaphragm and lung observed  Indwelling pleural catheter insertion: Once the site was marked using US, the pleurX catheter kit was used for the procedure. The patient's right chest was prepped in sterile fashion. A total of 10 1%lidocaine used to anesthetize the area. A finder needle was used to aspirate fluid. The wire was advanced using seldinger technique. A 1cm incision was made approximately 5cm anterior to the wire and at the wire site. The tunneling device was used to make a track towards the wire and insert the chest tube. A dilator was used to enlarge the track, then the peel-away catheter was used to insert the chest tube into the pleural space. The catheter was sutured into place using 2.0silk. A sterile dressing was placed. A total of 850cc was drained     Any disposable equipment was visually inspected and deemed to be intact immediately post procedure.      Relevant Pictures        Recommendations:     -->  Successful chest ultrasound and interpretation and right TPC placement   -->  Post-op CXR   -->  Drain daily for 3-months total.   -->  If no drainage for 3-consecutive days, please call Tonie Knott (below)      El De La O MD, MHA    of Medicine  Interventional Pulmonary  Department of Pulmonary, Allergy, Critical Care and Sleep Medicine   [facility]  Pager: [phone]   Office: [phone]  Email: [email or web address]    Tonie Knott CNS, OCN  Clinical Nurse Specialist  Department of Thoracic Surgery  Office:  [phone]  Email: [email or web address]    Tamanna Tellez  Interventional Pulmonology Surgery Scheduler  Office: [phone]  Email: [email or web address]

## 2021-06-29 NOTE — PLAN OF CARE
Alert and oriented x 4. Complained of pain in the right side of neck. Repositioned to to right side. Pure wick in place. Call light in reach. PIV at TKO.

## 2021-06-29 NOTE — PLAN OF CARE
OT 5B cancel. Pt out of room for a procedure in AM and participating in care conference regarding POC in PM. OT unable to check back due to scheduling demands. OT to cancel and reschedule per POC.

## 2021-06-29 NOTE — PROVIDER NOTIFICATION
Patient complaining of pain rated 10 on back, neck, shoulders and right knee and would like pain med but not tylenol. Stated that she takes Aleive at home.

## 2021-06-29 NOTE — PROGRESS NOTES
[facility]  Progress Note - [facility] 3 Service   Date of Admission: 6/19/2021     Assessment and Plan:   Bárbara Vuong is a 86 year old female admitted on 6/19/2021. 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 Oncology consulted. PET 6/28.     Pleurex catheter placed and care conference 6/29 with patient, son Sachin, Oncology and primary team. Decision made that Estela will discharge to TCU, with plan to setup hospice through that facility.     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 Diagnostic/therapeutic thoracentesis on 6/20, cytology consistent with adenocarcinoma. Also component of concomitant pneumonia, started on prednisone and Levaquin, 5 day course finished 6/26. Now determining next steps for treatment vs. hospice. PET demonstrated hypermetabolic area RUL mass, mediastinal LN, adrenal mets, osseous mets.   -Oncology following    - Care conference today at noon - planning to pursue hospice after TCU    - Remains full code    - Interventional Pulm consulted- pleurex cath today,small R sided pneumo present   - Continue albuterol   - Oxycodone 2.5-5mg prn added      # History of hypertension   Recurrence of elevated BPs in AM, blood pressures fluctuate throughout the day.   - Discontinued PTA hydralazine 37.5mg daily  - Cont Carvedilol 6.25mg BID   - PTA amlodipine 10mg daily  - PTA losartan 100mg daily      # Primary parathyroidism   # Mild hypercalcemia in setting of elevated PTH,  possible malignant hypercalcemia   # 1.1cm hypodense thyroid nodule, left   PTH elevated at 296, ionized calcium wnl at 5.0. Incidental thyroid nodule 1.1 cm seen on CT chest on 6/19. Nodule present on thyroid US, possibly related to malignancy, per criteria too small to bx at this time. Could be further characterized with PET scan (6/28).   - no intervention at this time     # Weakness  Likely from hypoxia and respiratory concerns  - PT/OT recommending TCU, family in agreement. May be discharged to TCU prior to PET depending on timing, she is open to this.   - Falls precaution      # Chronic hyponatremia:   Baseline 130-132 for the last 2 years, stable. Serum osmolality 278, urine osmolality 699, and urine sodium 72, concern for glucocorticoid deficiency. Cosyntropin stim test with appropriate cortisol response. Likely related to malignancy.  -Has been stable, not rechecking labs at this time     #Hyperlipidemia  - PTA Crestor 5mg every other day - could consider discontinuing on discharge    # CKD: stable (baseline 1.0)    # History of Embolic CVA  - Held PTA Plavix 75mg daily prior to thoracentesis, resumed      Diet: Combination Diet Low Saturated Fat Na <2400mg Diet, No Caffeine Diet  Fluid restriction 2000 ML FLUID  Fluids: PO  DVT Prophylaxis: Pneumatic Compression Devices, likely start lovenox tomorrow post procedure   Bobby Catheter: not present  Code Status: Full Code confirmed with patient 6/19/21    Shannan Eaton MD  Internal Medicine, PGY-1  Pager [phone]     Subjective:     No events overnight.   Back pain overnight, got PO Oxycodone 2.5mg x1 which resolved pain. Required starting on 2L. Subjectively denies any shortness of breath, states pain has resolved. Primary complaint is that she is thirsty.     Objective:    Temp:  [96.2  F (35.7  C)-97.7  F (36.5  C)] 97.7  F (36.5  C)  Pulse:  [76-92] 92  Resp:  [16-20] 20  BP: (108-170)/(51-65) 170/61  SpO2:  [87 %-96 %] 96 %    PHYSICAL EXAM:    General Appearance: alert, oriented, comfortable appearing  Respiratory: clear to auscultation anteriorly, no wheezing/crackles   Cardiovascular: RRR with no murmurs  GI: soft, obese, non-tender to palpation, non-distended  Skin: no rashes on exposed skin   Musculoskeletal: no LE edema  Neurologic: alert and oriented    Data reviewed today: I reviewed all new lab and imaging results over the last 24 hours.

## 2021-06-29 NOTE — PLAN OF CARE
"Blood pressure 108/51, pulse 78, temperature 96.7  F (35.9  C), temperature source Axillary, resp. rate 18, height 1.651 m (5' 5\"), weight 104.4 kg (230 lb 2.6 oz), SpO2 94 %. RA.      Patient A & O X 4, VSS no respiratory distress noted , denies pain. Patient up in chair for 2hr and tolerated well .  Appetite good . Had bowel movement x 1 . Purewick in place, coccyx and groin reddened , and applied barrier cream . Patient declined repositions , encourage as tolerated  to prevent pressure sore. Call light within reach hourly round completed . Continue monitor closely and update MD with concerns.  Patient Son was here to visit .      "

## 2021-06-29 NOTE — PROGRESS NOTES
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 Discussed all the workup that has been completed thus far during the hospitalization. Reviewed the PET/CT and diagnosis of metastatic lung cancer. Patient and son have been discussing what her wishes are and they do not want to proceed with biopsy and treatment of the cancer. They would like to go to TCU to regain some strength with the plan to enroll into home hospice.   - We will follow peripherally and are available for questions     Assessment & Plan:   Bárbara Vuong is a 86 year old female with PMH of carotid stenosis, embolic CVA, CKD 3, HTN who presented with worsening SOB found to have acute hypoxic respiratory failure in the ED. Found to have a large right pleural effusion, pleural fluid positive for malignancy     # Concern for Primary Lung Adenocarcinoma  # Recurrent malignant pleural R effusion     # Acute on chronic respiratory failure  Patient and son report worsening SOB over the last ~month.  Infectious workup (so far negative) per primary team, lactic trending down , procal low, NGTD with blood Cx. Underwent thoracentesis 6/20 with cytology positive for malignancy, immunoprofile consistent with adenocarcinoma likely lung primary.  - Repeat thoracentesis 6/23 w/ removal of 650mL (of note this was for repeat CT Chest not for symptomatic relief)   - PET/ CT completed 6/28 showed 6.4 x 3.0 cm hypermetabolic mass in the RUL. Multiple enlarged hypermetabolic right hilar and mediastinal lymph nodes, concerning for metastasis. Hypermetabolic L adrenal nodule  "concerning for metastasis. Scattered hypermetabolic osseous metastasis in the axial and appendicular skeleton, predominantly involving the pelvis. Moderated right sided pleural effusion.   - s/p Pluerx placement on 6/29 with interventional pulmonary team   - After care conference on 6/29 patient will not proceed with further biopsy and treatment of the cancer but focus on discharging to TCU to regain strength and will enroll in home hospice.      # ECOG 2-3  - Appreciate PT/OT -- currently recommending TCU      We will continue to follow this patient peripherally. Please do not hesitate to page with any questions or concerns.     Patient was seen and plan of care was discussed with attending physician Dr. Sourav Venegas PA-C  Hematology/Oncology   Pager: #[phone]  ___________________________________________________________________    Subjective & Interval History:    No acute events noted overnight. Please see above for care conference.     A comprehensive review of systems was obtained and is negative other than noted here or in the HPI.       Physical Exam:    Blood pressure 130/66, pulse 84, temperature 97.8  F (36.6  C), temperature source Oral, resp. rate 14, height 1.651 m (5' 5\"), weight 104.4 kg (230 lb 2.6 oz), SpO2 95 %.    General: lying in bed, no acute distress  HEENT: sclera anicteric, EOMI, MMM  Resp: Breathing comfortably on RA   MSK: warm and well-perfused, normal tone  Skin: no rashes on limited exam, no jaundice  Neuro: Alert and interactive, moves all extremities equally, no focal deficits    Labs & Studies: I personally reviewed the following studies:  ROUTINE LABS (Last four results):  CMP  Recent Labs   Lab 06/27/21  0529 06/26/21  1939 06/24/21  0633 06/23/21  0451   NA  --   --  127* 128*   POTASSIUM 4.7 5.1 4.5 5.0   CHLORIDE  --   --  97 98   CO2  --   --  23 23   ANIONGAP  --   --  7 7   GLC  --   --  102* 153*   BUN  --   --  25 22   CR  --   --  0.93 0.96   GFRESTIMATED  " --   --  56* 54*   GFRESTBLACK  --   --  65 63   RACHEL  --   --  9.6 9.4   MAG 2.2 2.2 2.2 2.3   PROTTOTAL  --   --  6.2*  --      CBC  Recent Labs   Lab 06/24/21  0633 06/23/21  0451   WBC 8.2 6.7   RBC 5.03 5.09   HGB 14.4 14.8   HCT 45.7 44.8   MCV 91 88   MCH 28.6 29.1   MCHC 31.5 33.0   RDW 13.9 13.9    350     INRNo lab results found in last 7 days.    Medications list for reference:  Current Facility-Administered Medications   Medication     albuterol (PROAIR HFA/PROVENTIL HFA/VENTOLIN HFA) 108 (90 Base) MCG/ACT inhaler 2 puff     amLODIPine (NORVASC) tablet 10 mg     carvedilol (COREG) tablet 6.25 mg     [Held by provider] clopidogrel (PLAVIX) tablet 75 mg     glucose gel 15-30 g    Or     dextrose 50 % injection 25-50 mL    Or     glucagon injection 1 mg     Lidocaine (LIDOCARE) 4 % Patch 1 patch     lidocaine (LMX4) cream     lidocaine 1 % 0.1-1 mL     lidocaine patch in PLACE     losartan (COZAAR) tablet 100 mg     melatonin tablet 1 mg     naloxone (NARCAN) injection 0.2 mg    Or     naloxone (NARCAN) injection 0.4 mg    Or     naloxone (NARCAN) injection 0.2 mg    Or     naloxone (NARCAN) injection 0.4 mg     oxyCODONE IR (ROXICODONE) half-tab 2.5-5 mg     polyethylene glycol (MIRALAX) Packet 17 g     rosuvastatin (CRESTOR) tablet 5 mg     sodium chloride (PF) 0.9% PF flush 3 mL     sodium chloride (PF) 0.9% PF flush 3 mL     vitamin C (ASCORBIC ACID) tablet 500 mg     Vitamin D3 (CHOLECALCIFEROL) tablet 50 mcg

## 2021-06-29 NOTE — PLAN OF CARE
RN assumed cares at 0700--1500     Neuro: A&O x4   Cardiac: WDL  Respiratory: R pleural catheter placed this AM. LS diminished in bilateral lower lobes. Clear bilaterally in upper lobes. On 2L O2 NC. Pt reports SOB w/ exertion. No wheezing noted on ausculation. Denies chest pain.   GI/: Purewick in place.   Skin: Coccyx and groin area are reddened. Bruising located on arms.   IV/Drains: R PIV SL. Purewick.   Activity: Lift for transfers.   Nutrition: Regular diet ordered following procedure this AM  Pain: Pt complained of pain in the R upper back. Lidocaine patch in place.      Plan of Care: Care conference completed this morning. Pt and family will require education prior to discharge on at home equipment.

## 2021-06-29 NOTE — PLAN OF CARE
Reason for Admission: Wheezing 2/2 acute hypoxic respiratory failure.     Status: No change    RN assumed cares at 0700    Vitals: WDL, some soft BP this afternoon.   Pain: Reports some pain in the neck, shoulders and right knee.   Neuro: Intact  Cardiac: WDL  Respiratory: New small pneumothorax; MD aware.  Started on 2L O2.   GI/: Purewick in place.  Very small stool this shift.  Lift to commode.   IV/Drains: PIV saline locked.  Pleurx drain placed in IR this AM.    Activity: Up with lift assist.   Skin: Jesus and pale.  Some blanchable redness at groin and buttocks   Labs: WDL    Plan of Care:  RN assumed cares at 0700, Pt alert and oriented, VS stable throughout the shift with some soft BP this afternoon.  Pt taken for Pleurx drain placement in IR this AM.  Care conference at bedside this afternoon; son present.  PIV saline locked.  Pt denies pain this shift.  Plan to discharge to TCU once accepted.  No acute incidents this shift.  Continue to monitor and notify MD of any changes.

## 2021-06-30 NOTE — PLAN OF CARE
"  SHIFT:  [id number]  SITUATION:  86 year old female admitted  With acute hypoxic respiratory failure and later found to have large right pleural effusion, pleural fluid + for malignancy.  PMH includes:  Carotid stenosis, embolic CVA, CKDlll, HTN,     VITALS: /53 (BP Location: Left arm)   Pulse 71   Temp 96  F (35.6  C) (Oral)   Resp 16   Ht 1.651 m (5' 5\")   Wt 105.4 kg (232 lb 5.8 oz)   SpO2 97%   BMI 38.67 kg/m   VSS on 2L oxygen per NC    PAIN: PRN oxycodone given with positive outcome    CARDIAC:  WNL    NEURO: Alert/oriented x4. orgetful. Able to communicate needs    RESPIRATORY:   S/p right pluerx catheter placement 6-29 with IR    GI/: Purewick in place with adequate UO.     DIET: Combination diet low saturated fat , Na <2400 mg, no caffeine,  , 2L FR    ACTIVITY: Lift patient. Assist of 2.  Weakness 2/2 from hypoxia and respiratory status. Prefers to lay on back. Receiving OT/PT     LINES/DRAINS: LUE     SKIN: Reddened groin/coccyx; beronica/pale    NOTABLE LABS: ,    EVENTS OF SHIFT: Patient slept between cares. Repositioned. Visited with loreta.     PLAN:  Continue to monitor and follow POC. Notify primary team of changes. Anticipate patient will have TCU placement at discharge with potential future enrollment with home hospice      "

## 2021-06-30 NOTE — PLAN OF CARE
RN assumed cares at 2148-2350     Vitals: BP are soft today. Lowest value 81/42. Latest 105/56. Other VS stable  Neuro: A&O x4  Cardiac: WDL   Respiratory: Diminished LS in bilateral LL. Pt now on RA.   GI/: Purewick in place.   Skin: Intact. Redness noted in sacrum/coccyx. Pt repositioned q 2 hrs.   IV/Drains: R PIV infiltrated. Replaced by vascular access. Pleurx catheter clean, dry, intact.   Activity: Assist of 2  Pain: C/O pain in her R knee. Alleviated by repositioning. Lidocaine patch in place on L upper back.       Plan of Care: Continue to monitor BP. 500 mL bolus administered this afternoon for hypotensive state. BP improved after bolus administration.

## 2021-06-30 NOTE — PLAN OF CARE
Provider notified (name): Dr. Meier  Reason for notification: Persistent hypotension. Latest BP 81/42.  Recommendation/request given to provider: Intervention to correct hypotension.   Response from provider: Provider will visit patient and decide an intervention at that time.

## 2021-06-30 NOTE — PROGRESS NOTES
Care Management Follow Up    Length of Stay (days): 11    Expected Discharge Date: 07/02/21     Concerns to be Addressed: discharge planning     Patient plan of care discussed at interdisciplinary rounds: Yes    Anticipated Discharge Disposition: Skilled Nursing Facilty, Transitional Care     Anticipated Discharge Services: None  Anticipated Discharge DME: None    Patient/family educated on Medicare website which has current facility and service quality ratings: Yes  Education Provided on the Discharge Plan: Yes  Patient/Family in Agreement with the Plan: yes    Referrals Placed by CM/SW: Post Acute Facilities    [facility] - possible bed tomorrow  [facility] - possible bed tomorrow  [facility] - assessing pt' referral  [facility] - [facility]  [facility] - referral sent  [facility] - referral sent  [facility] - referral sent  [facility]- referral sent    Private pay costs discussed: transportation costs    Additional Information:  SW following for TCU placement. Spoke with pt and pt's son, son Sachin expressed frustration that pt is still in the hospital and that she is not progressing with rehab while here. He requested referrals be made to the following facilities noted above. At this time, there are two facilities that may have a bed tomorrow and the rest are assessing pt. SW will continue to follow for discharge placement.     Addendum 1530  Received call from admissions at [facility] that they can take pt in their TCU tomorrow. Ride set up for 0900 via  [employer]. Notified [facility] 3 team, who will update pt and pt's son.     TOMER Wong, [facility]  [address]   [facility]  Phone: [phone]  Pager: [phone]

## 2021-06-30 NOTE — DISCHARGE SUMMARY
[facility]  Discharge Summary - Medicine & Pediatrics       Date of Admission:  6/19/2021  Date of Discharge:  7/1/2021  9:24 AM  Discharging Provider: Dr. 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    Unresulted Labs Ordered in the Past 30 Days of this Admission     Date and Time Order Name Status Description    6/23/2021 1132 Fungus Culture, non-blood Preliminary     6/23/2021 1132 AFB Culture Non Blood Preliminary     6/20/2021 0719 Fungus Culture, non-blood Preliminary       These results will be followed up by PCP    Discharge Disposition   Discharged to home  Condition at discharge: [facility]    Hospital Course   Bárbara Vuong is a 86 year old female admitted on 6/19/2021. She has a history of carotid stenosis, embolic CVA, CKD 3, HTN presenting with acute on progressive dyspnea found to have acute hypoxic respiratory failure, wheezing with imaging revealing a large right pleural effusion, newly diagnosed with metastatic adenocarcinoma (possible lung primary).    The following problems were addressed during her hospitalization:    # New diagnosis of metastatic adenocarcinoma- possible lung primary  # Recurrent malignant pleural R effusion s/p Pleurex cath placement  # Acute hypoxic respiratory failure   # Post obstructive pneumonia  She presented with hypoxia and large right pleural effusion. Underwent diagnostic and therapeutic thoracentesis with negative bacterial or fungal growth to date, exudative in nature. Cytology unfortunately came back positive for likely adenocarcinoma, suspect lung primary. She was treated  "with levaquin and prednisone for likely post-obstructive pneumonia given her wheezing. PET CT demonstrated hypermetabolic areas upper lobe of right lung, right hilar and mediastinal lymph nodes, and scattered osseous mets (axial, appendicular, pelvis, spine). Pleurex catheter was placed 6/29 for her recurrent effusion by interventional pulmonary, with small right apical pneumothorax (non-hypoxic), that was treated with oxygen. Oncology was consulted and kindly assisted with management. A care conference was held with Estela and her son, Sachin, who both expressed wishes for no further biopsy, no aggressive treatments at this time, and plan to transition to hospice after rehabilitation and Sachin's wedding. Code status was also discussed with patient and they expressed Full Code status currently, and will revisit this as an outpatient after family has had time to come to terms with these news. Social work reached out to TCU who will assist patient with enrollment into Hospice.   - Oxycodone 2.5mg BID prn    - Regarding Pleurex cath:   \"Detailed written orders and patient insurance information for drainage kits have been completed and faxed to [employer], ATTN: Nette Johnson.  [employer] Customer Service can be reached at [phone].     Patient received a pleurX starter kit containing 4 drainage bottles and dressing packs.  This box is to be sent with patient upon hospital discharge.\"     # Hyperparathyroidism  # Mild hypercalcemia   # 1.1cm hypodense thyroid nodule, left  Suspect malignancy related vs primary. Family and patient would not like to pursue further treatments. Discussed the possibility of constipation in conjunction with opioids.     # Chronic hyponatremia:   Baseline 130-132 for the last 2 years, stable. Serum osmolality 278, urine osmolality 699, and urine sodium 72, concern for glucocorticoid deficiency. Cosyntropin stim test with appropriate cortisol response. Likely " related to malignancy.    # History of hypertension: PTA Losartan, Amlodipine, Hydralazine   # HLD: PTA Crestor 5mg every other day   # CKD: stable  # History of embolic CVA: PTA Plavix     Consultations This Hospital Stay   VASCULAR ACCESS CARE ADULT IP CONSULT  PHYSICAL THERAPY ADULT IP CONSULT  OCCUPATIONAL THERAPY ADULT IP CONSULT  INTERNAL MEDICINE PROCEDURE TEAM ADULT IP CONSULT [facility]  CARE MANAGEMENT / SOCIAL WORK IP CONSULT  OCCUPATIONAL THERAPY ADULT IP CONSULT  PHYSICAL THERAPY ADULT IP CONSULT  VASCULAR ACCESS CARE ADULT IP CONSULT  VASCULAR ACCESS CARE ADULT IP CONSULT  INTERNAL MEDICINE PROCEDURE TEAM ADULT IP CONSULT [facility]  VASCULAR ACCESS CARE ADULT IP CONSULT  VASCULAR ACCESS CARE ADULT IP CONSULT  ONCOLOGY ADULT IP CONSULT  VASCULAR ACCESS CARE ADULT IP CONSULT  INTERVENTIONAL RADIOLOGY ADULT/PEDS IP CONSULT  INTERVENTIONAL PULMONARY ADULT IP CONSULT  VASCULAR ACCESS CARE ADULT IP CONSULT  PHYSICAL THERAPY ADULT IP CONSULT  OCCUPATIONAL THERAPY ADULT IP CONSULT  INTERVENTIONAL PULMONARY ADULT IP CONSULT    Code Status   Full Code       The patient was discussed with MD Sid Sawant  Service  [facility] [address] [address]  Phone: [phone]  ______________________________________________________________________    Physical Exam   Vital Signs: Temp: 97.9  F (36.6  C) Temp src: Oral BP: 105/56 Pulse: 69   Resp: 16 SpO2: 95 % O2 Device: None (Room air) Oxygen Delivery: 2 LPM  Weight: 232 lbs 5.84 oz     General Appearance: alert, oriented, comfortable appearing  Respiratory: clear to auscultation anteriorly, no wheezing/crackles   Cardiovascular: RRR with no murmurs  GI: soft, obese, non-tender to palpation, non-distended  Skin: no rashes on exposed skin   Musculoskeletal: no LE edema  Neurologic: alert and oriented      Primary Care Physician   Janis Louis    Discharge Orders      Care  Coordination Referral      General info for SNF    Length of Stay Estimate: Short Term Care: Estimated # of Days 31-90  Condition at Discharge: Stable  Level of care:skilled   Rehabilitation Potential: Good  Admission H&P remains valid and up-to-date: Yes  Recent Chemotherapy: N/A  Use Nursing Home Standing Orders: Yes     Mantoux instructions    Give two-step Mantoux (PPD) Per Facility Policy Yes     Reason for your hospital stay    You were hospitalized due to increasing shortness of breath and need for oxygen. A large fluid collection was found between your lung membranes which was drained (1.5 L) and tested. The initial labs are concerning for an infection vs. Malignancy, the latter of which remains a strong possibility. Your primary care provider will follow up these tests.     Glucose monitor nursing POCT    Before meals and at bedtime     Daily weights    Call Provider for weight gain of more than 2 pounds per day or 5 pounds per week.     Additional Discharge Instructions    Please stop your lasix (water pill) you were taking prior to arrival to the hospital. It is unclear whether you need it at this point, and may in the future be restarted by your primary care provider as medically indicated. Please follow up with your primary care provider in 1-2 weeks to discuss your lab results from your fluid around your lungs.     Activity - Up with nursing assistance     General info for SNF    Length of Stay Estimate: Short Term Care: Estimated # of Days <30  Condition at Discharge: Stable  Level of care:board and care  Rehabilitation Potential: Good  Admission H&P remains valid and up-to-date: Yes  Recent Chemotherapy: N/A  Use Nursing Home Standing Orders: N/A     Mantoux instructions    Give two-step Mantoux (PPD) Per Facility Policy No (if no explain)     Reason for your hospital stay    You were hospitalized for shortness of breath and found to have fluid around your lung (pleural effusion). We removed the  fluid with a needle twice and unfortunately the fluid was found to have cancer cells. A catheter was placed to keep the fluid from accumulating. We had the oncology team see you while here, and you underwent a PET scan which unfortunately showed the cancer had spread to other parts of your body. We had a care conference and decided that you would like to focus on quality of life rather than treating your cancer. You will discharge to a TCU and then transition to hospice after you regain your strength.     Activity - Up with assistive device     Follow Up and recommended labs and tests    Follow up with Nursing home physician.  No follow up labs or test are needed.     Additional Discharge Instructions    Patient underwent tunneled pleural catheter placement (Right chest) utilizing a 15.5 Fr. pleurX catheter.  LOT #[id number].       Detailed written orders and patient insurance information for drainage kits have been completed and faxed to [employer], ATTN: Nette Johnson.  [employer] Customer Service can be reached at [phone].     Estela received a pleurX starter kit containing 4 drainage bottles and dressing packs.      New Meds:   - Oxycodone 15 tablets for pain     Full Code     Occupational Therapy Adult Consult    Evaluate and treat as clinically indicated.    Reason:  Generalized weakness and deconditioning     Physical Therapy Adult Consult    Evaluate and treat as clinically indicated.    Reason:  Generalized weakness and deconditioning     Physical Therapy Adult Consult    Evaluate and treat as clinically indicated.    Reason:  General deconditioning during hospital stay, acute illness     Occupational Therapy Adult Consult    General deconditioning during hospital stay, acute illness.     Fall precautions     Fall precautions     Pneumatic Compression Device     Bilateral calf. Remove 30 mins BID.     Advance Diet as Tolerated    Follow this diet upon discharge: Orders Placed  This Encounter      Regular Diet Adult       Significant Results and Procedures     Pleural Fluid 6/20/21:  - Positive for malignancy, see comment.   - Specimen Adequacy: Satisfactory for evaluation.     COMMENT:   Immunohistochemical stains are performed with appropriate control   reactions on the cell block material. The   tumor cells are positive for MOC-31, Rober-EP4, CK7 and TTF-1 while negative    for Calretinin, CK20,   Thyroglobulin, PAX-8 and CDX-2. The immunoprofile supports a diagnosis of   adenocarcinoma, most likely of lung   primary origin. Clinical and radiologic correlation is recommended.     Results for orders placed or performed during the hospital encounter of 06/19/21   XR Chest Port 1 View    Narrative    EXAMINATION: XR CHEST PORT 1 VIEW, 6/19/2021 10:05 AM    COMPARISON: 6/11/2021    HISTORY: Shortness of Breath    FINDINGS: Right pleural effusion bibasilar opacities, increased from  prior. Hazy opacity in the right mid and upper lung is similar to  slightly increased. Increased interstitial opacities. Heart size is  stable. Shoulder arthroplasty       Impression    IMPRESSION:   1. Increased right pleural effusion and associated atelectasis.  2. Increased interstitial opacities. Consider edema versus infection.     BONNIE PEREZ MD   CT Chest Pulmonary Embolism w Contrast    Narrative    EXAMINATION: CTA pulmonary angiogram, 6/19/2021 12:46 PM     COMPARISON: Chest x-ray 6/19/2021    HISTORY: PE suspected, low/intermediate probability, negative d-dimer    TECHNIQUE: Volumetric helical acquisition of CT images of the chest  from the lung apices to the kidneys were acquired after the  administration of 73 mL of Isovue-370 IV contrast. Post-processed  multiplanar and/or MIP reformations were obtained, archived to PACS  and used in interpretation of this study.     FINDINGS:      Contrast bolus is: adequate.  Exam is negative for acute pulmonary  embolism. Heart size within normal limits.. No  significant pericardial  effusion.. Visualized thoracic aorta and main pulmonary artery  diameters appear within normal limits. Normal 3 vessel branching  pattern of the great vessels. Atherosclerotic calcifications of the  great vessels and aortic arch. Coronary artery calcifications.    Reflux of contrast into the IVC? no  Paradoxical bowing of the interventricular septum to the left? no    Remaining chest: 1.1 cm hypodensity of the left thyroid lobe. Small  sliding hiatal hernia.     Tracheal secretions/debris. Large right pleural effusion. Near  complete decompressive atelectasis of the right lower lobe. Left  basilar atelectasis. Peripheral reticular opacities, predominantly  involving the lingula and left lower lobe. No pneumothorax. 3 mm solid  pulmonary nodule of the left upper lobe (series 9, image 94).    Upper abdomen: Partial fatty atrophy of the pancreas. Contrast within  the bilateral proximal ureters, possibly from prior CT with contrast.    Bones/Soft Tissues: Extensive degenerative changes in the visualized  spine. Left shoulder arthroplasty. No acute osseous abnormalities or  suspicious bony lesions.      Impression    IMPRESSION:   1. No pulmonary embolism.  2. Large right pleural effusion with near complete compressive  atelectasis of the right lower lobe.  3. 1.1 cm hypodensity of the left thyroid lobe. Recommend nonurgent  thyroid ultrasound for further evaluation if not previously done.  4. 3 mm solid pulmonary nodule in the left upper lobe. If the patient  is high risk for lung cancer, consider a follow-up chest CT in 12  months. If the patient is low risk for lung cancer, no follow-up is  necessary per Fleischner Society guidelines.      In the event of a positive result for acute pulmonary embolism  resulting in right heart strain, consider calling the   [facility]  for PERT (Pulmonary Embolism Response Team)  Activation?    PERT -- Pulmonary Embolism Response Team  (Multidisciplinary team  including cardiology, interventional radiology, critical care,  hematology)    I have personally reviewed the examination and initial interpretation  and I agree with the findings.    BONNIE PEREZ MD   XR Chest Port 1 View    Narrative    EXAM: XR CHEST PORT 1 VIEW  6/20/2021 2:12 PM     HISTORY:  s/p right thora       COMPARISON: 6/19/2021    FINDINGS:   Portable AP view of the chest. Cardiac silhouette appears within  normal limits. Decreased right pleural effusion with decreased hazy  opacity over right mid lung field. No appreciable pneumothorax.  Bilateral shoulder arthroplasties.      Impression    IMPRESSION: Decreased right pleural effusion. No appreciable  pneumothorax.    I have personally reviewed the examination and initial interpretation  and I agree with the findings.    GRACE GONZALEZ MD   US Thyroid    Narrative    US THYROID 6/22/2021 1:47 PM    COMPARISON: None    HISTORY: thyroid nodule on ultrasound, has elevated PTH and calcium,  please evaluate parathyroid abnormalities    FINDINGS:   Thyroid parenchyma: homogenous  The right lobe of the thyroid measures: 4.8 x 2.1 x 2.0 cm  The left lobe of the thyroid measures: 2.4 x 5.2 x 1.8 cm  The thyroid isthmus measures: 4 mm    Nodule 1:  Lobe: Left  Location: Inferior  Size: 13 x 10 x 7 mm  Composition: Cystic or almost completely cystic (0 points)  Echogenicity: Anechoic (0 points)  Shape: Wider than tall (0 points)  Margin: Smooth (0 points)  Echogenic Foci: None or large comet tail artifact (0 points)  Stability: Not previously imaged  TIRADS: TR1 (0 points) Benign    Other subcentimeter hypoechoic solid and cystic nodules are  statistically benign and therefore not discussed in full detail.        Impression    Impression:  There is a single anechoic cyst measuring over 1 cm in the inferior  left lobe of the thyroid. This is TR1. Multiple other subcentimeter  cysts and hypoechoic solid nodules are also present  and are  statistically benign based on size criteria and do not meet criteria  for tissue sampling or follow-up, therefore not fully described.    ACR TI-RADS recommendations  TR2 (2 points) & TR1 (0 points) -No FNA or follow-up  TR3 (3 points) - FNA if ? 2.5cm, follow-up if 1.5 -2.4 cm in 1, 3 and  5 years  TR4 (4-6 points) - FNA if ? 1.5cm, follow-up if 1 -1.4 cm in 1, 2, 3  and 5 years  TR5 (?7 points) - FNA if ? 1cm, follow-up if 0.5 -0.9 cm every year  for 5 years     I have personally reviewed the examination and initial interpretation  and I agree with the findings.    KELTON PIZANO MD   CT Chest w Contrast     Value    Radiologist flags Lung nodule    Narrative    EXAMINATION: CT CHEST W CONTRAST, 6/22/2021 2:59 PM    TECHNIQUE: Helical CT images from the thoracic inlet through the lung  bases were obtained with IV contrast.     COMPARISON: CT 6/19/2021, 6/20/2021    HISTORY: Pleural effusion, malignancy suspected    FINDINGS:    Chest: Hypoattenuating focus of the left lobe of the thyroid measuring  9 mm, unchanged. Esophagus appears unremarkable. Atheromatous  calculations of the coronary arteries, thoracic aorta and great  vessels. No significant pericardial effusion. Visualized thoracic  aorta and main pulmonary artery diameters appear within normal limits.  Normal 3 vessel branching pattern of the great vessels. Enlarged right  hilar lymph node measuring 11 mm short axis (series 2, image 23).    Perifissural nodules. Small sub-4 mm pulmonary nodules, for example a  3 mm pulmonary nodule in the left upper (series 6, image 91) are  unchanged. Peripheral reticulations throughout the lung fields.  Decreased moderate right effusion. Right lower lobe and right upper  lobe atelectasis. No pneumothorax.     Abdomen: Biliary sludge. Hypoattenuating focus of the right adrenal  gland measuring 9 mm. Multiple simple appearing renal cysts, largest  of which measures approximately 1.5 cm in the superior pole of  the  left kidney, unchanged. Trace ascites.    Bones: No suspicious osseous lesion. Multilevel degenerative changes  of the spine. Postsurgical changes of bilateral shoulder  arthroplasties.     Soft Tissues: No suspicious mass.        Impression    IMPRESSION:   1. Decreased moderate right pleural effusion.   2. Mildly enlarged right hilar lymph node, presumably reactive.  2. Improved visualization of peripheral reticulations, which may  represent changes of mild/early pulmonary fibrosis.  3. Severe coronary artery calcifications.  4. Right adrenal focus which could represent an adrenal adenoma but is  incompletely evaluated on this chest CT. Additional evaluation of the  right adrenal gland could be performed with a dedicated adrenal CT as  clinically indicated.  5. Unchanged small pulmonary nodules. Consider follow-up chest CT in  one year if the patient is considered high risk for lung cancer.      [Consider Follow Up: Lung nodule]    This report will be copied to the [facility] to ensure a  provider acknowledges the finding.             I have personally reviewed the examination and initial interpretation  and I agree with the findings.    NIC LAKHANI MD   CT Chest w/o Contrast    Narrative    EXAMINATION: Chest CT  6/23/2021 7:34 PM    CLINICAL HISTORY: Abnormal xray - pleural effusion    COMPARISON: 6/23/2021.    TECHNIQUE: CT imaging obtained through the chest without contrast.  Axial, coronal, and sagittal reconstructions and axial MIP reformatted  images are reviewed.     FINDINGS:  Lungs: Central airways are clear. Diffuse peripheral reticular  opacities with no significant change. Decreased right pleural effusion  with small residual, predominantly along the posterior right fissure.  Trace left pleural effusion. Scattered perifissural lymph nodes. Mild  medial bibasilar atelectasis.    Mediastinum: Aortoiliac locations. Severe coronary artery  calcifications. Heart size is normal. No  pericardial effusion.  Normal  thoracic vasculature. No thoracic lymphadenopathy.     Bones and soft tissues: No suspicious bone findings. Bilateral  shoulder arthroplasty.    Upper Abdomen: Limited evaluation of the upper abdomen demonstrates no  acute abdominal pathology. Cholelithiasis without cholecystitis.      Impression    IMPRESSION:   1. Significant decrease in right pleural effusion status post  thoracentesis with small residual. Residual opacity along the right  major fissure posteriorly, likely some loculated fluid with associated  atelectasis/consolidation. Right peribronchial opacity atelectasis  versus infection.  2. Redemonstration of peripheral reticular opacities likely relates to  early changes of fibrotic process.    I have personally reviewed the examination and initial interpretation  and I agree with the findings.    GRACE GONZALEZ MD   XR Chest Port 1 View    Narrative    EXAM: XR CHEST PORT 1 VIEW  6/23/2021 5:33 PM     HISTORY:  s/p thora       COMPARISON:  6/22/2021    FINDINGS:     The trachea is midline. The cardiomediastinal silhouette is within  normal limits. Low lung volumes. Decrease in interstitial opacities.  Stable retrocardiac and left basilar opacity. Slightly decreased small  right pleural effusion. Bilateral shoulder arthroplasties. No  significant pneumothorax.      Impression    IMPRESSION:   1. Slightly decreased right pleural effusion status post  thoracentesis.  2. Decreased interstitial opacities and stable retrocardiac left  basilar opacity suggestive of decreased interstitial edema and  superimposed atelectasis.    I have personally reviewed the examination and initial interpretation  and I agree with the findings.    GRACE GONZALEZ MD   PET Oncology Whole Body    Narrative    Combined Report of:    PET and CT on  6/28/2021 12:49 PM :    1. PET of the neck, chest, abdomen, and pelvis.  2. PET CT Fusion for Attenuation Correction and Anatomical  Localization:     3. Diagnostic CT scan of the chest, abdomen, and pelvis with  intravenous contrast for interpretation.  3. CT of the chest, abdomen and pelvis obtained for diagnostic  interpretation.  4. 3D MIP and PET-CT fused images were processed on an independent  workstation and archived to PACS and reviewed by a radiologist.    Technique:    1. PET: The patient received 11.55 mCi of F-18-FDG; the serum glucose  was 160 prior to administration, body weight was 107.7 kg. Images were  evaluated in the axial, sagittal, and coronal planes as well as the  rotational whole body MIP. Images were acquired from the Vertex to the  Feet.    UPTAKE WAS MEASURED AT 60 MINUTES.     BACKGROUND:  Liver SUV max= 3.0,   Aorta Blood SUV Max: .     2. CT: Volumetric acquisition for clinical interpretation of the  chest, abdomen, and pelvis acquired at 3 mm sections . The chest,  abdomen, and pelvis were evaluated at 5 mm sections in bone, soft  tissue, and lung windows.      The patient received 135 cc of Isovue 370 intravenously for the  examination.      3. 3D MIP and PET-CT fused images were processed on an independent  workstation and archived to PACS and reviewed by a radiologist.    INDICATION: Right lung cancer    ADDITIONAL INFORMATION OBTAINED FROM EMR: 86-year-old female presented  with several weeks of worsening shortness of breath profound the right  pleural effusion. Diagnostic thoracentesis on 6/20/2021 the cytology  positive for malignancy.     COMPARISON: CT chest 6/23/2021, 6/22/2021, 6/19/2021    FINDINGS:     HEAD/NECK:  Please refer to the separately dictated report for results of the high  resolution PET/CT of the neck.     CHEST:  In the medial right upper lobe in the hilar region there is a  heterogeneously attenuating, hypermetabolic mass measuring 6.4 x 3.0  cm within SUV max of 6.7 (series 8, image 172). There are several  enlarged and hypermetabolic right axillary lymph nodes, for example a  1.7 cm right hilar lymph  node and an SUV max of 5.7 (series 8, image  174). 1.3 cm right paratracheal lymph node with SUV max of 6.3 (series  8, image 170). 0.8 cm subcarinal lymph node with SUV max of 3.6  (series 8, image 176). Moderate right pleural effusion, increased in  size from 6/23/2021.    The heart is normal in size. No pericardial effusion. Ascending aorta  and main pulmonary artery are normal in caliber. Atherosclerotic  calcifications of the aortic arch and the origins of great vessels.  Aortic annular calcifications. Central tracheobronchial tree is  patent. Several small pulmonary nodules in both lungs are unchanged  since 6/23/2021, for example a 3 mm solid nodule left upper lobe  (series 11, image 37) and a 2 mm solid nodule along the right major  fissure (series 11, image 43). Peripheral subpleural reticular  opacities in both lungs, similar to prior, likely fibrotic change.    ABDOMEN AND PELVIS:  Hypermetabolic left adrenal nodule measuring 1.7 cm and SUV max of 4.9  (series 8, image 245).    Small nodular focus of FDG uptake in the right hepatic lobe with an  SUV max of 3.2 (series 8, image 247). No correlating lesion is seen on  the CT images. Liver is otherwise unremarkable.    The gallbladder, pancreas, spleen and right adrenal gland are  unremarkable. Both kidneys are unremarkable. Several left renal cysts.  No hydronephrosis or hydroureter. Urinary bladder is unremarkable. No  pelvic masses. Small and large bowel are normal in caliber. Colonic  diverticulosis without evidence of diverticulitis. Unremarkable  appendix. Atherosclerotic calcifications of the abdominal aorta and  iliac arteries. Abdominal aorta is normal in caliber. No enlarged  inguinal, pelvic or abdominal lymph nodes. Mild fat stranding along  the course of the distal right ureter in the right lower quadrant the  abdomen. No free intraperitoneal air.    LOWER EXTREMITIES:   Focus of FDG uptake in the distal left ureter with an SUV max of  5.2  (series 8, image 11). Postsurgical changes of total right hip  arthroplasty.     BONES:   There are numerous sclerotic and lytic hypermetabolic metastatic  osseous lesions the axial and appendicular skeleton, predominantly  involving the pelvis and spine, including but not limited to:  - Mixed sclerotic and lytic lesion in the posterior inferior pubic  ramus with SUV max of 9.5 (series 8, image 391)  - Lesion in the left acetabulum with SUV max of 6.3 (series 8, image  364)  - Lesion in the right sacrum with SUV max of 9.5 (series 8, image  3:30), and a lesion in the left sacrum with an SUV max of 6.1 (series  8, image 331).  - Lesion in the right iliac bone with SUV max of 6.9 (series 8, image  323)  - Lesion in the right pedicle transverse process of L4 with an SUV max  of 7.8 (series 8, image 300)  - Lesion in the L3 vertebral body with an SUV max of 6.1 (series 8,  image 273)  - Lesion in the L2 vertebral body with an SUV max of 3.7 (series 8,  image 256), and in the T11 vertebral body with an SUV max of 8.6  (series 8, image 228)  - Lesion in the distal left clavicle with SUV max of 6.8 (series 8,  image 141)  - Lesion in the right scapula with SUV max of 6.0 (series 8, image  128)  - Lesion in the C3 vertebral body with an SUV max of 6.1 (series 8,  image 104)  - Lesion in the posterior right 11th rib with an SUV max of 4.4  (series 8, image 216).    Surgical changes of bilateral total shoulder arthroplasties. FDG  uptake along the arthroplasty proximal right humerus with SUV max of  3.9 (series 8, image 118), and along the margin of the arthroplasty in  the proximal left humerus with an SUV max of 3.8 (series 8, image 99).    Surgical changes of posterior fusion instrumentation of the lumbar  spine at L4-5. Multilevel degenerative changes of the spine.      Impression    IMPRESSION: In this patient with a history recently diagnosed   adenocarcinoma of the right lun. 6.4 x 3.0 cm hypermetabolic  mass in the right upper lobe,  consistent with patient's primary malignancy.  2. Multiple enlarged hypermetabolic right hilar and mediastinal lymph  nodes, concerning for metastasis.  3. Hypermetabolic left adrenal nodule, concerning for metastasis.  4. Scattered hypermetabolic osseous metastasis in the axial and  appendicular skeleton, predominantly involving the pelvis and spine.  5. Moderate right pleural effusion, increased in size since 6/23/2021.  6. Please see the separately dictated report for results of the high  resolution PET/CT of the neck.    I have personally reviewed the examination and initial interpretation  and I agree with the findings.    LEXUS BELTRAN MD   XR Chest 2 Views    Narrative    Exam: XR CHEST 2 VW, 6/28/2021 10:36 AM    Indication: assess need for pleurex cath, history of malignant  effusion    Comparison: CT 6/23/2021    Findings:   Low lung volumes. Trachea is midline. Cardiac silhouette within normal  limits. Increased small right pleural effusion. Increased bilateral  interstitial/hazy opacities.  Bilateral shoulder arthroplasties.      Impression    Impression:   1. Irma lung volumes with increased bilateral hazy/interstitial  opacities. Atelectasis and possibly superimposed interstitial edema,  recommend follow-up to clearing to exclude nodule.  2. Increased small right pleural effusion.    I have personally reviewed the examination and initial interpretation  and I agree with the findings.    PATRIA NAVARRO MD   CT Soft Tissue Neck w Contrast    Narrative    PET CT fusion examination 6/28/2021 12:50 PM  1. Neck CT with contrast  2. PET study of the neck  3. PET CT fusion study of the neck    History:  Adenocarcinoma right lung.    Comparison: None.    Technique: Please refer to the accompanying whole body PET-CT for  report of the dose and whole body PET-CT findings.  Regarding the neck, axial images were obtained after nonionic  intravenous contrast administration, with  sagittal and coronal  reconstructions performed. Neck CT images were reviewed in bone, soft  tissue, and lung windows, with review of the fused PET-CT images as  well in multiple planes.    Findings:  Evaluation of the mucosal space demonstrates no abnormality or  abnormal metabolic uptake on PET CT in the nasopharynx, oropharynx,  hypopharynx or the glottis. The tongue base appears normal. The major  salivary glands are unremarkable. Thyroid gland is unremarkable, aside  from a non-FDG avid 10 mm hypodense nodule in the left lobe.  Periapical lucency and associated FDG uptake about the right maxillary  central incisor with an SUV max of 3.8 (series 4, image 85).    There is no evident cervical lymphadenopathy, and the cervical lymph  nodes are within normal limits by size criteria. No abnormal metabolic  uptake on PET CT.     Major vascular structures in the neck appear patent. Atherosclerotic  calcifications of both carotid bifurcations, left greater than right.  Hypermetabolic osseous metastasis in the C3 vertebral body with an SUV  max of 6.8 (series 4, image 104). Please refer to the whole body  PET/CT performed as a separate report for additional findings and the  bones in the remainder of the body.      Impression    Impression: In this patient with a history of recently diagnosed  adenocarcinoma right lun. On the fusion PET CT, there is no abnormal metabolic activity in  the mucosal space, soft tissues, or cervical noemi chains.   2. No evidence of mucosal, noemi, or soft tissue abnormality on  contrast enhanced neck CT.  3. Hypermetabolic metastatic lesion in the C3 vertebral body. Please  refer to the whole body PET CT performed as a separate report, for  additional findings in the bones and the remainder of the body.    I have personally reviewed the examination and initial interpretation  and I agree with the findings.    LEXUS BELTRAN MD   CT Chest/Abdomen/Pelvis w Contrast    Narrative     Combined Report of:    PET and CT on  6/28/2021 12:49 PM :    1. PET of the neck, chest, abdomen, and pelvis.  2. PET CT Fusion for Attenuation Correction and Anatomical  Localization:    3. Diagnostic CT scan of the chest, abdomen, and pelvis with  intravenous contrast for interpretation.  3. CT of the chest, abdomen and pelvis obtained for diagnostic  interpretation.  4. 3D MIP and PET-CT fused images were processed on an independent  workstation and archived to PACS and reviewed by a radiologist.    Technique:    1. PET: The patient received 11.55 mCi of F-18-FDG; the serum glucose  was 160 prior to administration, body weight was 107.7 kg. Images were  evaluated in the axial, sagittal, and coronal planes as well as the  rotational whole body MIP. Images were acquired from the Vertex to the  Feet.    UPTAKE WAS MEASURED AT 60 MINUTES.     BACKGROUND:  Liver SUV max= 3.0,   Aorta Blood SUV Max: .     2. CT: Volumetric acquisition for clinical interpretation of the  chest, abdomen, and pelvis acquired at 3 mm sections . The chest,  abdomen, and pelvis were evaluated at 5 mm sections in bone, soft  tissue, and lung windows.      The patient received 135 cc of Isovue 370 intravenously for the  examination.      3. 3D MIP and PET-CT fused images were processed on an independent  workstation and archived to PACS and reviewed by a radiologist.    INDICATION: Right lung cancer    ADDITIONAL INFORMATION OBTAINED FROM EMR: 86-year-old female presented  with several weeks of worsening shortness of breath profound the right  pleural effusion. Diagnostic thoracentesis on 6/20/2021 the cytology  positive for malignancy.     COMPARISON: CT chest 6/23/2021, 6/22/2021, 6/19/2021    FINDINGS:     HEAD/NECK:  Please refer to the separately dictated report for results of the high  resolution PET/CT of the neck.     CHEST:  In the medial right upper lobe in the hilar region there is a  heterogeneously attenuating, hypermetabolic mass  measuring 6.4 x 3.0  cm within SUV max of 6.7 (series 8, image 172). There are several  enlarged and hypermetabolic right axillary lymph nodes, for example a  1.7 cm right hilar lymph node and an SUV max of 5.7 (series 8, image  174). 1.3 cm right paratracheal lymph node with SUV max of 6.3 (series  8, image 170). 0.8 cm subcarinal lymph node with SUV max of 3.6  (series 8, image 176). Moderate right pleural effusion, increased in  size from 6/23/2021.    The heart is normal in size. No pericardial effusion. Ascending aorta  and main pulmonary artery are normal in caliber. Atherosclerotic  calcifications of the aortic arch and the origins of great vessels.  Aortic annular calcifications. Central tracheobronchial tree is  patent. Several small pulmonary nodules in both lungs are unchanged  since 6/23/2021, for example a 3 mm solid nodule left upper lobe  (series 11, image 37) and a 2 mm solid nodule along the right major  fissure (series 11, image 43). Peripheral subpleural reticular  opacities in both lungs, similar to prior, likely fibrotic change.    ABDOMEN AND PELVIS:  Hypermetabolic left adrenal nodule measuring 1.7 cm and SUV max of 4.9  (series 8, image 245).    Small nodular focus of FDG uptake in the right hepatic lobe with an  SUV max of 3.2 (series 8, image 247). No correlating lesion is seen on  the CT images. Liver is otherwise unremarkable.    The gallbladder, pancreas, spleen and right adrenal gland are  unremarkable. Both kidneys are unremarkable. Several left renal cysts.  No hydronephrosis or hydroureter. Urinary bladder is unremarkable. No  pelvic masses. Small and large bowel are normal in caliber. Colonic  diverticulosis without evidence of diverticulitis. Unremarkable  appendix. Atherosclerotic calcifications of the abdominal aorta and  iliac arteries. Abdominal aorta is normal in caliber. No enlarged  inguinal, pelvic or abdominal lymph nodes. Mild fat stranding along  the course of the distal  right ureter in the right lower quadrant the  abdomen. No free intraperitoneal air.    LOWER EXTREMITIES:   Focus of FDG uptake in the distal left ureter with an SUV max of 5.2  (series 8, image 11). Postsurgical changes of total right hip  arthroplasty.     BONES:   There are numerous sclerotic and lytic hypermetabolic metastatic  osseous lesions the axial and appendicular skeleton, predominantly  involving the pelvis and spine, including but not limited to:  - Mixed sclerotic and lytic lesion in the posterior inferior pubic  ramus with SUV max of 9.5 (series 8, image 391)  - Lesion in the left acetabulum with SUV max of 6.3 (series 8, image  364)  - Lesion in the right sacrum with SUV max of 9.5 (series 8, image  3:30), and a lesion in the left sacrum with an SUV max of 6.1 (series  8, image 331).  - Lesion in the right iliac bone with SUV max of 6.9 (series 8, image  323)  - Lesion in the right pedicle transverse process of L4 with an SUV max  of 7.8 (series 8, image 300)  - Lesion in the L3 vertebral body with an SUV max of 6.1 (series 8,  image 273)  - Lesion in the L2 vertebral body with an SUV max of 3.7 (series 8,  image 256), and in the T11 vertebral body with an SUV max of 8.6  (series 8, image 228)  - Lesion in the distal left clavicle with SUV max of 6.8 (series 8,  image 141)  - Lesion in the right scapula with SUV max of 6.0 (series 8, image  128)  - Lesion in the C3 vertebral body with an SUV max of 6.1 (series 8,  image 104)  - Lesion in the posterior right 11th rib with an SUV max of 4.4  (series 8, image 216).    Surgical changes of bilateral total shoulder arthroplasties. FDG  uptake along the arthroplasty proximal right humerus with SUV max of  3.9 (series 8, image 118), and along the margin of the arthroplasty in  the proximal left humerus with an SUV max of 3.8 (series 8, image 99).    Surgical changes of posterior fusion instrumentation of the lumbar  spine at L4-5. Multilevel degenerative  changes of the spine.      Impression    IMPRESSION: In this patient with a history recently diagnosed   adenocarcinoma of the right lun. 6.4 x 3.0 cm hypermetabolic mass in the right upper lobe,  consistent with patient's primary malignancy.  2. Multiple enlarged hypermetabolic right hilar and mediastinal lymph  nodes, concerning for metastasis.  3. Hypermetabolic left adrenal nodule, concerning for metastasis.  4. Scattered hypermetabolic osseous metastasis in the axial and  appendicular skeleton, predominantly involving the pelvis and spine.  5. Moderate right pleural effusion, increased in size since 2021.  6. Please see the separately dictated report for results of the high  resolution PET/CT of the neck.    I have personally reviewed the examination and initial interpretation  and I agree with the findings.    LEXUS BELTRAN MD   XR Chest Port 1 View     Value    Radiologist flags Small right pneumothorax. (Urgent)    Narrative    XR CHEST PORT 1 VIEW  2021 11:43 AM      HISTORY: evaluate for pneumothorax on right after Pleurx chest tube  placed on right side    COMPARISON: PET/CT 2021. Chest x-ray 2021.    FINDINGS:   Portable AP 45 degree chest x-ray. Interval placement of right basilar  chest tube.    Trachea is midline. Cardiac silhouette is within normal limits. Low  lung volumes. Interval improved aeration of bilateral lung fields.  Small right greater than left pleural effusions, decreased on the  right. Small right pneumothorax.    No acute osseous abnormality. Bilateral shoulder arthroplasty changes.  Visualized soft tissues and upper abdomen are unremarkable.      Impression    IMPRESSION:     1. Interval placement of right basilar chest tube with new small right  apical pneumothorax.  2. Small right greater than left pleural effusions, decreased on the  right.  3. Improved aeration in the bilateral lung fields.    [Urgent Result: Small right pneumothorax.]    Finding was  identified on 2021 12:55 PM.     Dr. Eaton was contacted by Dr. Matt at 2021 1:05 PM and  verbalized understanding of the urgent finding.     I have personally reviewed the examination and initial interpretation  and I agree with the findings.    PATRIA NAVARRO MD   Echocardiogram Limited    Narrative    [id number]  [id number]  [id number]  [id number]^ELIAS^CRISTIANE     [facility],[place]  Echocardiography Laboratory  [address] [place] [id number]     Name: IVONNE NEGRON  MRN: [id number]  : 1935  Study Date: 2021 05:33 PM  Age: 86 yrs  Gender: Female  Patient Location: [facility]  Reason For Study: Other, Please Specify in Comments  Ordering Physician: CRISTIANE GRIFFIN  Referring Physician: CRISTIANE GRIFFIN  Performed By: Rikki Chavira     BSA: 2.1 m2  Height: 65 in  Weight: 232 lb  HR: 104  BP: 137/87 mmHg  ______________________________________________________________________________  Procedure  Limited Portable Echo Adult. The patient's rhythm is sinus tachycardia.  ______________________________________________________________________________  Interpretation Summary  The patient's rhythm is sinus tachycardia.     Global and regional left ventricular function is hyperkinetic with an EF of  65-70%.  Global right ventricular function is normal.  The inferior vena cava was normal in size with preserved respiratory  variability.  No pericardial effusion is present.     This study was compared with the study from 2016 .  There has been no change.  ______________________________________________________________________________  Left Ventricle  Left ventricular size is normal. Global and regional left ventricular function  is hyperkinetic with an EF of 65-70%. Diastolic function not assessed due to  tachycardia.     Right Ventricle  The right ventricle is normal size. Global right ventricular function is  normal.     Atria  The atria cannot be assessed.     Mitral  Valve  The mitral valve is normal.     Aortic Valve  On Doppler interrogation, there is no significant stenosis or regurgitation.     Tricuspid Valve  The valve leaflets are not well visualized. On Doppler interrogation, there is  no significant stenosis or regurgitation.     Vessels  The inferior vena cava was normal in size with preserved respiratory  variability. The aorta root cannot be assessed.     Pericardium  No pericardial effusion is present.     Compared to Previous Study  This study was compared with the study from 2016 . There has been no change.     ______________________________________________________________________________  Doppler Measurements & Calculations  MV E max zachary: 54.8 cm/sec  MV A max zachary: 115.0 cm/sec  MV E/A: 0.48     Ao V2 max: 174.0 cm/sec  Ao max P.0 mmHg  LV V1 max P.9 mmHg  LV V1 max: 131.0 cm/sec  LV V1 VTI: 22.8 cm  AV Zachary Ratio (DI): 0.75     ______________________________________________________________________________  Report approved by: Payton DALE 2021 07:19 PM               Discharge Medications   Current Discharge Medication List      CONTINUE these medications which have NOT CHANGED    Details   albuterol (PROAIR HFA/PROVENTIL HFA/VENTOLIN HFA) 108 (90 Base) MCG/ACT inhaler Inhale 2 puffs into the lungs every 6 hours  Qty: 8 g, Refills: 3    Comments: Pharmacy may dispense brand covered by insurance (Proair, or proventil or ventolin or generic albuterol inhaler)  Associated Diagnoses: Wheeze      amLODIPine (NORVASC) 10 MG tablet Take 1 tablet (10 mg) by mouth daily  Qty: 90 tablet, Refills: 1    Associated Diagnoses: Hypertension goal BP (blood pressure) < 130/80; Cerebrovascular accident (CVA) due to thrombosis of cerebral artery (H)      Ascorbic Acid (VITAMIN C) 500 MG CAPS Take 1 capsule by mouth daily      Cholecalciferol (VITAMIN D) 50 MCG (2000) CAPS Take 1 capsule by mouth daily      clopidogrel (PLAVIX) 75 MG tablet TAKE 1 TABLET BY  MOUTH ONCE DAILY  Qty: 90 tablet, Refills: 1    Associated Diagnoses: Long term (current) use of antithrombotics/antiplatelets      DIPHENHYDRAMINE HCL PO Take by mouth nightly as needed for sleep      hydrALAZINE (APRESOLINE) 25 MG tablet TAKE 1.5 TABLETS BY MOUTH ONCE DAILY,  Qty: 135 tablet, Refills: 1    Associated Diagnoses: Hypertension goal BP (blood pressure) < 130/80      losartan (COZAAR) 100 MG tablet Take 1 tablet (100 mg) by mouth daily  Qty: 90 tablet, Refills: 1    Associated Diagnoses: Hypertension goal BP (blood pressure) < 130/80      rosuvastatin (CRESTOR) 5 MG tablet TAKE A 1/2 TABLET (2.5MG) BY MOUTH EVERY EVENING  Qty: 45 tablet, Refills: 3    Associated Diagnoses: CARDIOVASCULAR SCREENING; LDL GOAL LESS THAN 130         STOP taking these medications       furosemide (LASIX) 20 MG tablet Comments:   Reason for Stopping:             Allergies   Allergies   Allergen Reactions     Penicillins      Melatonin      Keeps awake

## 2021-06-30 NOTE — PROGRESS NOTES
[facility]  Progress Note - [place] 3 Service   Date of Admission: 6/19/2021     Assessment and Plan:   Bárbara Vuong is a 86 year old female admitted on 6/19/2021. 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 Oncology consulted.     CT/PET demonstrated hypermetabolic areas upper lobe of right lung, right hilar and mediastinal lymph nodes, and scattered osseous mets (axial, appendicular, pelvis, spine. Pleurex catheter placed 6/29 for her recurrent effusion.     At care conference 6/29 son Estela Stephenson, Oncology and our team met. Decision was made that she does not want biopsy or  aggressive treatment. Will plan to discharge to TCU, and will arrange hospice after TCU.     # New diagnosis of adenocarcinoma- possible lung primary, recurrent malignant pleural R effusion s/p Pleurex cath placement  # Acute on likely chronic hypoxic respiratory failure   Progressive SOB within the year, worsening subacutely last 6 weeks, now with acute worsening in the last 2 weeks. Diagnostic/therapeutic thoracentesis on 6/20, cytology consistent with adenocarcinoma. Also component of concomitant pneumonia, started on prednisone and Levaquin, 5 day course finished 6/26. Now determining next steps for treatment vs. hospice. PET demonstrated hypermetabolic area RUL mass, mediastinal LN, adrenal mets, osseous mets.   -Oncology following    - Care conference 6/29- no biopsy, no aggressive treatment, will plan to transition to hospice after TCU/son's wedding    - Remains full code    - Interventional Pulm consulted- pleurex cath 6/29, small R sided pneumo present   - Continue albuterol   - Oxycodone 2.5-5mg prn added      Regarding  "Pleurex cath:   \"Detailed written orders and patient insurance information for drainage kits have been completed and faxed to [employer], ATTN: Nette Johnson.  [employer] Customer Service can be reached at [phone].     Patient received a pleurX starter kit containing 4 drainage bottles and dressing packs.  This box is to be sent with patient upon hospital discharge.\"     # History of hypertension  #Episode of hypotension today 6/30   Typically having elevated BPs in AM, blood pressures fluctuate throughout the day.      Today: Blood pressure initially SBP <120 this morning, however still received morning amlodipine and BP 80/40 on recheck. Pt entirely asymptomatic, not short of breath, exam unchanged. BP meds have been altered this  Admission so maybe component of that.   - IVF bolus 500cc now   - Hold Carvedilol 6.25mg BID   - PTA amlodipine 10mg daily  - PTA losartan 100mg daily   - Discontinued PTA hydralazine 37.5mg daily     # Primary parathyroidism   # Mild hypercalcemia in setting of elevated PTH, possible malignant hypercalcemia   # 1.1cm hypodense thyroid nodule, left   PTH elevated at 296, ionized calcium wnl at 5.0. Incidental thyroid nodule 1.1 cm seen on CT chest on 6/19. Nodule present on thyroid US, possibly related to malignancy, per criteria too small to bx at this time.  - no intervention at this time     # Weakness  Likely from hypoxia and respiratory concerns  - PT/OT recommending TCU, family in agreement. May be discharged to TCU prior to PET depending on timing, she is open to this.   - Falls precaution      # Chronic hyponatremia:   Baseline 130-132 for the last 2 years, stable. Serum osmolality 278, urine osmolality 699, and urine sodium 72, concern for glucocorticoid deficiency. Cosyntropin stim test with appropriate cortisol response. Likely related to malignancy.  -Has been stable, not rechecking labs at this time     #Hyperlipidemia  - PTA Crestor 5mg every " other day - could consider discontinuing on discharge    # CKD: stable (baseline 1.0)    # History of Embolic CVA  - Held PTA Plavix 75mg daily prior to thoracentesis, resumed      Diet: Combination Diet Low Saturated Fat Na <2400mg Diet, No Caffeine Diet  Fluid restriction 2000 ML FLUID  Fluids: PO  DVT Prophylaxis: Pneumatic Compression Devices, likely start lovenox tomorrow post procedure   Bobby Catheter: not present  Code Status: Full Code confirmed with patient 6/19/21    Shannan Eaton MD  Internal Medicine, PGY-1  Pager [phone]     Subjective:     No events overnight. BPs running 80/40 this morning, asymptomatic. When went to see patient she is feeling at baseline. Is in good spirits, making jokes. Denies any shortness of breath, pain, lightheadedness, dizziness, or any further complaints at this time.      Objective:    Temp:  [96  F (35.6  C)-97.8  F (36.6  C)] 96.4  F (35.8  C)  Pulse:  [71-85] 83  Resp:  [16] 16  BP: ()/(41-54) 81/42  SpO2:  [93 %-98 %] 96 %    PHYSICAL EXAM:   General Appearance: alert, oriented, comfortable appearing  Respiratory: clear to auscultation anteriorly, no wheezing/crackles   Cardiovascular: RRR with no murmurs  GI: soft, obese, non-tender to palpation, non-distended  Skin: no rashes on exposed skin   Musculoskeletal: no LE edema  Neurologic: alert and oriented    Data reviewed today: I reviewed all new lab and imaging results over the last 24 hours.

## 2021-06-30 NOTE — DISCHARGE INSTRUCTIONS
Detailed written orders and patient insurance information for drainage kits have been completed and faxed to [employer], ATTN: Nette Johnson.  [employer] Customer Service can be reached at [phone].

## 2021-07-01 NOTE — PLAN OF CARE
"BP (!) 147/61 (BP Location: Left arm)   Pulse 84   Temp 98.8  F (37.1  C) (Axillary)   Resp 16   Ht 1.651 m (5' 5\")   Wt 107.2 kg (236 lb 5.3 oz)   SpO2 94%   BMI 39.33 kg/m      Assumed Cares: 2837-1854  Neuro: A&O x 4  Respiratory: Diminished LS in bases; denies SOB  Cardiac: WDL; denies chest pain  GI/: Purewick with good output; 1 BM this shift  Skin: Blanchable redness to coccyx and perineum; incontinence cares completed and pt encouraged to reposition q 2 hrs  Lines: Pleurex cath WDL; R PIV SL  Pain: Denies pain  Diet: Regular diet  Activity Level: 2 assist with transfers  Significant Events: No significant events noted  Plan: Continue with POC and notify provider with changes in pt status; possible discharge to TCU today      "

## 2021-07-01 NOTE — PROGRESS NOTES
Care Management Discharge Note    Discharge Date: 07/01/21     Discharge Disposition:     [facility]  [address] [address]  Ph: [phone]  F: [phone] - orders sent    Discharge Services: None    Discharge DME: None    Discharge Transportation: [facility] wheelchair ride @ 0900    Private pay costs discussed: transportation costs    PAS Confirmation Code: [id number]   Patient/family educated on Medicare website which has current facility and service quality ratings: (Family already has facility in mind)    Education Provided on the Discharge Plan: Yes  Persons Notified of Discharge Plans: Patient, pt's son Sid Stephenson 3 team, Rashida @ [facility]  Patient/Family in Agreement with the Plan: yes    Handoff Referral Completed: Yes    Additional Information:  Pt to discharge to [facility] at 0900 today. Pt and pt's son Sachin are aware and agreeable to plan. Discharge orders faxed. TOMER Wong, SHAW  [address]   [facility]  Phone: [phone]  Pager: [phone]         SHAW Lopez

## 2021-07-01 NOTE — LETTER
7/1/2021        RE: Bárbara Vuong  [address] [place] [address]        [facility]  PRIMARY CARE PROVIDER AND CLINIC:  Janis Louis DO, [address] / [facility] [address]  Chief Complaint   Patient presents with     Hospital F/U     [facility] Medical Record Number:  [id number]  Place of Service where encounter took place:  [facility]  AT [facility] [[id number]]    Bárbara Vuong  is a 86 year old  (1935), admitted to the above facility from  [facility]. Hospital stay 6/19/21 through 7/1/21..  Admitted to this facility for  rehab, medical management and nursing care.    HPI:    HPI information obtained from: facility chart records, facility staff, patient report, [facility] chart review and Care Everywhere [facility] chart review.     Brief Summary of Hospital Course: 86 year old female admitted on 6/19/2021. She has a history of carotid stenosis, embolic CVA, CKD 3, HTN presenting with acute on progressive dyspnea found to have acute hypoxic respiratory failure, wheezing with imaging revealing a large right pleural effusion, newly diagnosed with metastatic adenocarcinoma (possible lung primary).    Updates on Status Since Skilled nursing Admission: Met with patient who denies any chest pain, palpitations, shortness of breath, GARCIA, lightheadedness, dizziness, or cough. Denies any abdominal discomfort. Denies N&V. Denies B&B concerns. Denies dysuria or frequency. Denies loose or constipation. Appetite good. Sleeping well.  Admitted today with no orders for pleurex catheter care or drainage needs. Attempted to contact pulmonology with no success. Per pulmonology notes we are to drain daily for 3 months. Contacted Registered Nurse from hospital and catheter was placed on 6/29/21 and it was not touched during her stay since then.     BP Readings from Last 3 Encounters:   07/01/21 106/60   07/01/21 (!) 147/61   06/25/21 (!) 156/78     Wt  Readings from Last 5 Encounters:   07/01/21 106.7 kg (235 lb 3.2 oz)   06/30/21 107.2 kg (236 lb 5.3 oz)   06/25/21 103.4 kg (228 lb)   03/12/21 107.5 kg (237 lb)   08/05/19 111.7 kg (246 lb 4.8 oz)     CODE STATUS/ADVANCE DIRECTIVES DISCUSSION:   CPR/Full code   Patient's living condition: lives alone  ALLERGIES: Penicillins and Melatonin  PAST MEDICAL HISTORY:  has a past medical history of CARDIOVASCULAR SCREENING; LDL GOAL LESS THAN 130 (5/9/2010), CVA (cerebral infarction), Generalized osteoarthrosis, unspecified site, History of blood transfusion, Hypertension goal BP (blood pressure) < 130/80 (2/17/2011), 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  PAST SURGICAL HISTORY:   has a past surgical history that includes NONSPECIFIC PROCEDURE; NONSPECIFIC PROCEDURE; NONSPECIFIC PROCEDURE; NONSPECIFIC PROCEDURE; and Insert chest tube (Right, 6/29/2021).  FAMILY HISTORY: family history includes C.A.D. in her mother; Hypertension in her mother.  SOCIAL HISTORY:   reports that she quit smoking about 33 years ago. Her smoking use included cigarettes. She has a 7.00 pack-year smoking history. She has never used smokeless tobacco. She reports current alcohol use of about 7.0 standard drinks of alcohol per week. She reports that she does not use drugs.    Post Discharge Medication Reconciliation Status: discharge medications reconciled and changed, per note/orders    Current Outpatient Medications   Medication Sig Dispense Refill     albuterol (PROAIR HFA/PROVENTIL HFA/VENTOLIN HFA) 108 (90 Base) MCG/ACT inhaler Inhale 2 puffs into the lungs every 6 hours 8 g 3     amLODIPine (NORVASC) 10 MG tablet Take 1 tablet (10 mg) by mouth daily 90 tablet 1     clopidogrel (PLAVIX) 75 MG tablet TAKE 1 TABLET BY MOUTH ONCE DAILY 90 tablet 1     diphenhydrAMINE (BENADRYL) 25 MG capsule Take 1 capsule (25 mg) by mouth nightly as needed for sleep 60 capsule 0     hydrALAZINE (APRESOLINE) 25 MG tablet  "TAKE 1.5 TABLETS BY MOUTH ONCE DAILY, 135 tablet 1     losartan (COZAAR) 100 MG tablet Take 1 tablet (100 mg) by mouth daily 90 tablet 1     oxyCODONE (ROXICODONE) 5 MG tablet Take 0.5-1 tablets (2.5-5 mg) by mouth every 6 hours as needed for moderate to severe pain Take 2.5mg for pain 1-5; take 5mg for pain >6 60 tablet 0     rosuvastatin (CRESTOR) 5 MG tablet TAKE A 1/2 TABLET (2.5MG) BY MOUTH EVERY EVENING 45 tablet 3     ROS:  10 point ROS of systems including Constitutional, Eyes, Respiratory, Cardiovascular, Gastroenterology, Genitourinary, Integumentary, Musculoskeletal, Psychiatric were all negative except for pertinent positives noted in my HPI.    Vitals:  /60   Pulse 79   Temp 97.4  F (36.3  C)   Resp 17   Ht 1.651 m (5' 5\")   Wt 106.7 kg (235 lb 3.2 oz)   SpO2 95%   BMI 39.14 kg/m    Exam:  GENERAL APPEARANCE:  Alert, in no distress, oriented, cooperative  ENT:  Mouth and posterior oropharynx normal, moist mucous membranes, [place]  EYES:  EOM, conjunctivae, lids, pupils and irises normal  RESP:  respiratory effort and palpation of chest normal, no respiratory distress, diminished breath sounds no pain  CV:  Palpation and auscultation of heart done , regular rate and rhythm, no murmur, rub, or gallop, no edema, +2 pedal pulses  ABDOMEN:  normal bowel sounds, soft, nontender, no hepatosplenomegaly or other masses, no guarding or rebound  M/S:   wheelchair bound  SKIN:  Inspection of skin and subcutaneous tissue baseline, Palpation of skin and subcutaneous tissue baseline  NEURO:   Cranial nerves 2-12 are normal tested and grossly at patient's baseline, no purposeful movement in upper and lower extremities  PSYCH:  oriented X 3, memory impaired , affect and mood normal    Lab/Diagnostic data:  Labs done in SNF are in [place] EPIC. Please refer to them using EPIC/Care Everywhere.    ASSESSMENT/PLAN:  (R53.81) Physical deconditioning  (primary encounter diagnosis)  (M62.81) Generalized muscle " weakness  Comment: Acute on chronic. S/T below diagnosis  Plan:   -Continue Physical therapy and Occupational therapy  -SW to remain involve for safe discharge planning needs  -Recommend hospice at discharge when ready    (C79.9) Metastatic adenocarcinoma (H)  (J91.0) Malignant pleural effusion  (J96.01) Acute respiratory failure with hypoxia (H)  (J18.9) Obstructive pneumonia  Comment: She presented with hypoxia and large right pleural effusion. Underwent diagnostic and therapeutic thoracentesis with negative bacterial or fungal growth to date, exudative in nature. Cytology unfortunately came back positive for likely adenocarcinoma, suspect lung primary. She was treated with levaquin and prednisone for likely post-obstructive pneumonia given her wheezing. PET CT demonstrated hypermetabolic areas upper lobe of right lung, right hilar and mediastinal lymph nodes, and scattered osseous mets (axial, appendicular, pelvis, spine). Pleurex catheter was placed 6/29 for her recurrent effusion by interventional pulmonary, with small right apical pneumothorax (non-hypoxic), that was treated with oxygen. Oncology was consulted and kindly assisted with management. A care conference was held with Estela and her son, Sachin, who both expressed wishes for no further biopsy, no aggressive treatments at this time, and plan to transition to hospice after rehabilitation and Sachin's wedding. Code status was also discussed with patient and they expressed Full Code status currently, and will revisit this as an outpatient after family has had time to come to terms with these news. Social work reached out to TCU who will assist patient with enrollment into Hospice.   Plan:   -Continue to monitor pain   -Monitor for worsening s/sx of concerns  -Monitor respiratory status  -Albuterol inhaler Q 6hours scheduled  -Continue PRN oxycodone as directed.   -Pleurex cath as directed with cares. Detailed written orders and patient insurance  information for drainage kits have been completed and faxed to [employer], ATTN: Nette Johnson.  [employer] Customer Service can be reached at [phone]. Staff to contact her for additional supplies when needed. She was discharged with 4 bottles.   -Perform daily drain from pleurex catheter daily for 3 months. Do not remove more than 1000mL each time.     (E21.3) Hyperparathyroidism (H)  (E83.52) Hypercalcemia  (E04.1) Thyroid nodule  Comment: Acute on chronic. Suspect malignancy related vs primary. Family and patient would not like to pursue further treatments. Discussed the possibility of constipation in conjunction with opioids.   Plan:   -Monitor for worsening s/sx of concerns  -Hospice upon discharge    (E87.1) Chronic hyponatremia  Comment: Baseline 130-132 for the last 2 years, stable. Serum osmolality 278, urine osmolality 699, and urine sodium 72, concern for glucocorticoid deficiency. Cosyntropin stim test with appropriate cortisol response. Likely related to malignancy.  Plan:   -Monitor for worsening s/sx of concerns  -Hospice upon discharge    (I10) Hypertension goal BP (blood pressure) < 130/80  (N18.30) Stage 3 chronic kidney disease, unspecified whether stage 3a or 3b CKD  (E78.5) Hyperlipidemia, unspecified hyperlipidemia type  Comment: Acute on chronic. Based on JNC-8 goals,  patients age of 86 year old, presence of diabetes or CKD, and goals of care goal BP is  <140/90 mm Hg. Patient is stable with current plan of care and routine assessment.. Baseline creatinine~0.84-1.10  Plan:   -Monitor BP and HR as directed  -Continue losartan as directed  -Continue amlodipine as directed  -Continue hydralazine as directed  -Continue crestor as directed for now. 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 Noted in 2015.   Plan:   -Monitor BP and HR  -Continue plavix as  directed  -Hospice upon discharge.   -Follow up with cardiology as directed    Total time spent with patient visit at the [facility] nursing [facility] was 60 min including patient visit and review of past records. Greater than 50% of total time spent with counseling and coordinating care with nursing staff due to the complexities of their diagnoses, review of HPI, development of POC, assisting HUC on appointment schedules, and patient education and review of POC with patient which includes 30 min discussion with patient on: current medications/orders changes, recent blood work results, continued discharge plan/needs, subsequent treatment plan while in TCU and thereafter, current pain control plan and controlled substances ordered.     Electronically signed by:  Tasneem Silverio DNP, APRN          Sincerely,        CHRIS Jackson CNP

## 2021-07-01 NOTE — PROGRESS NOTES
[facility]  PRIMARY CARE PROVIDER AND CLINIC:  Janis Lousi DO, [address] [facility] [address]  Chief Complaint   Patient presents with     Hospital F/U     [facility] Medical Record Number:  [id number]  Place of Service where encounter took place:  [facility] ([facility]) [[id number]]    Bárbara Vuong  is a 86 year old  (1935), admitted to the above facility from  [facility]. Hospital stay 6/19/21 through 7/1/21..  Admitted to this facility for  rehab, medical management and nursing care.    HPI:    HPI information obtained from: facility chart records, facility staff, patient report, [facility] Epic chart review and Care Everywhere [facility] chart review.     Brief Summary of Hospital Course: 86 year old female admitted on 6/19/2021. She has a history of carotid stenosis, embolic CVA, CKD 3, HTN presenting with acute on progressive dyspnea found to have acute hypoxic respiratory failure, wheezing with imaging revealing a large right pleural effusion, newly diagnosed with metastatic adenocarcinoma (possible lung primary).    Updates on Status Since Skilled nursing Admission: Met with patient who denies any chest pain, palpitations, shortness of breath, GARCIA, lightheadedness, dizziness, or cough. Denies any abdominal discomfort. Denies N&V. Denies B&B concerns. Denies dysuria or frequency. Denies loose or constipation. Appetite good. Sleeping well.  Admitted today with no orders for pleurex catheter care or drainage needs. Attempted to contact pulmonology with no success. Per pulmonology notes we are to drain daily for 3 months. Contacted Registered Nurse from hospital and catheter was placed on 6/29/21 and it was not touched during her stay since then.     BP Readings from Last 3 Encounters:   07/01/21 106/60   07/01/21 (!) 147/61   06/25/21 (!) 156/78     Wt Readings from Last 5 Encounters:   07/01/21 106.7 kg (235 lb 3.2 oz)   06/30/21 107.2 kg (236 lb 5.3  oz)   06/25/21 103.4 kg (228 lb)   03/12/21 107.5 kg (237 lb)   08/05/19 111.7 kg (246 lb 4.8 oz)     CODE STATUS/ADVANCE DIRECTIVES DISCUSSION:   CPR/Full code   Patient's living condition: lives alone  ALLERGIES: Penicillins and Melatonin  PAST MEDICAL HISTORY:  has a past medical history of CARDIOVASCULAR SCREENING; LDL GOAL LESS THAN 130 (5/9/2010), CVA (cerebral infarction), Generalized osteoarthrosis, unspecified site, History of blood transfusion, Hypertension goal BP (blood pressure) < 130/80 (2/17/2011), 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  PAST SURGICAL HISTORY:   has a past surgical history that includes NONSPECIFIC PROCEDURE; NONSPECIFIC PROCEDURE; NONSPECIFIC PROCEDURE; NONSPECIFIC PROCEDURE; and Insert chest tube (Right, 6/29/2021).  FAMILY HISTORY: family history includes C.A.D. in her mother; Hypertension in her mother.  SOCIAL HISTORY:   reports that she quit smoking about 33 years ago. Her smoking use included cigarettes. She has a 7.00 pack-year smoking history. She has never used smokeless tobacco. She reports current alcohol use of about 7.0 standard drinks of alcohol per week. She reports that she does not use drugs.    Post Discharge Medication Reconciliation Status: discharge medications reconciled and changed, per note/orders    Current Outpatient Medications   Medication Sig Dispense Refill     albuterol (PROAIR HFA/PROVENTIL HFA/VENTOLIN HFA) 108 (90 Base) MCG/ACT inhaler Inhale 2 puffs into the lungs every 6 hours 8 g 3     amLODIPine (NORVASC) 10 MG tablet Take 1 tablet (10 mg) by mouth daily 90 tablet 1     clopidogrel (PLAVIX) 75 MG tablet TAKE 1 TABLET BY MOUTH ONCE DAILY 90 tablet 1     diphenhydrAMINE (BENADRYL) 25 MG capsule Take 1 capsule (25 mg) by mouth nightly as needed for sleep 60 capsule 0     hydrALAZINE (APRESOLINE) 25 MG tablet TAKE 1.5 TABLETS BY MOUTH ONCE DAILY, 135 tablet 1     losartan (COZAAR) 100 MG tablet Take 1 tablet  "(100 mg) by mouth daily 90 tablet 1     oxyCODONE (ROXICODONE) 5 MG tablet Take 0.5-1 tablets (2.5-5 mg) by mouth every 6 hours as needed for moderate to severe pain Take 2.5mg for pain 1-5; take 5mg for pain >6 60 tablet 0     rosuvastatin (CRESTOR) 5 MG tablet TAKE A 1/2 TABLET (2.5MG) BY MOUTH EVERY EVENING 45 tablet 3     ROS:  10 point ROS of systems including Constitutional, Eyes, Respiratory, Cardiovascular, Gastroenterology, Genitourinary, Integumentary, Musculoskeletal, Psychiatric were all negative except for pertinent positives noted in my HPI.    Vitals:  /60   Pulse 79   Temp 97.4  F (36.3  C)   Resp 17   Ht 1.651 m (5' 5\")   Wt 106.7 kg (235 lb 3.2 oz)   SpO2 95%   BMI 39.14 kg/m    Exam:  GENERAL APPEARANCE:  Alert, in no distress, oriented, cooperative  ENT:  Mouth and posterior oropharynx normal, moist mucous membranes, [place]  EYES:  EOM, conjunctivae, lids, pupils and irises normal  RESP:  respiratory effort and palpation of chest normal, no respiratory distress, diminished breath sounds no pain  CV:  Palpation and auscultation of heart done , regular rate and rhythm, no murmur, rub, or gallop, no edema, +2 pedal pulses  ABDOMEN:  normal bowel sounds, soft, nontender, no hepatosplenomegaly or other masses, no guarding or rebound  M/S:   wheelchair bound  SKIN:  Inspection of skin and subcutaneous tissue baseline, Palpation of skin and subcutaneous tissue baseline  NEURO:   Cranial nerves 2-12 are normal tested and grossly at patient's baseline, no purposeful movement in upper and lower extremities  PSYCH:  oriented X 3, memory impaired , affect and mood normal    Lab/Diagnostic data:  Labs done in SNF are in [facility] EPIC. Please refer to them using EPIC/Care Everywhere.    ASSESSMENT/PLAN:  (R53.81) Physical deconditioning  (primary encounter diagnosis)  (M62.81) Generalized muscle weakness  Comment: Acute on chronic. S/T below diagnosis  Plan:   -Continue Physical therapy and " Occupational therapy  -SW to remain involve for safe discharge planning needs  -Recommend hospice at discharge when ready    (C79.9) Metastatic adenocarcinoma (H)  (J91.0) Malignant pleural effusion  (J96.01) Acute respiratory failure with hypoxia (H)  (J18.9) Obstructive pneumonia  Comment: She presented with hypoxia and large right pleural effusion. Underwent diagnostic and therapeutic thoracentesis with negative bacterial or fungal growth to date, exudative in nature. Cytology unfortunately came back positive for likely adenocarcinoma, suspect lung primary. She was treated with levaquin and prednisone for likely post-obstructive pneumonia given her wheezing. PET CT demonstrated hypermetabolic areas upper lobe of right lung, right hilar and mediastinal lymph nodes, and scattered osseous mets (axial, appendicular, pelvis, spine). Pleurex catheter was placed 6/29 for her recurrent effusion by interventional pulmonary, with small right apical pneumothorax (non-hypoxic), that was treated with oxygen. Oncology was consulted and kindly assisted with management. A care conference was held with Estela and her son, Sachin, who both expressed wishes for no further biopsy, no aggressive treatments at this time, and plan to transition to hospice after rehabilitation and Sachin's wedding. Code status was also discussed with patient and they expressed Full Code status currently, and will revisit this as an outpatient after family has had time to come to terms with these news. Social work reached out to TCU who will assist patient with enrollment into Hospice.   Plan:   -Continue to monitor pain   -Monitor for worsening s/sx of concerns  -Monitor respiratory status  -Albuterol inhaler Q 6hours scheduled  -Continue PRN oxycodone as directed.   -Pleurex cath as directed with cares. Detailed written orders and patient insurance information for drainage kits have been completed and faxed to [employer], ATTN: Nette  [employer] can be reached at [phone]. Staff to contact her for additional supplies when needed. She was discharged with 4 bottles.   -Perform daily drain from pleurex catheter daily for 3 months. Do not remove more than 1000mL each time.     (E21.3) Hyperparathyroidism (H)  (E83.52) Hypercalcemia  (E04.1) Thyroid nodule  Comment: Acute on chronic. Suspect malignancy related vs primary. Family and patient would not like to pursue further treatments. Discussed the possibility of constipation in conjunction with opioids.   Plan:   -Monitor for worsening s/sx of concerns  -Hospice upon discharge    (E87.1) Chronic hyponatremia  Comment: Baseline 130-132 for the last 2 years, stable. Serum osmolality 278, urine osmolality 699, and urine sodium 72, concern for glucocorticoid deficiency. Cosyntropin stim test with appropriate cortisol response. Likely related to malignancy.  Plan:   -Monitor for worsening s/sx of concerns  -Hospice upon discharge    (I10) Hypertension goal BP (blood pressure) < 130/80  (N18.30) Stage 3 chronic kidney disease, unspecified whether stage 3a or 3b CKD  (E78.5) Hyperlipidemia, unspecified hyperlipidemia type  Comment: Acute on chronic. Based on JNC-8 goals,  patients age of 86 year old, presence of diabetes or CKD, and goals of care goal BP is  <140/90 mm Hg. Patient is stable with current plan of care and routine assessment.. Baseline creatinine~0.84-1.10  Plan:   -Monitor BP and HR as directed  -Continue losartan as directed  -Continue amlodipine as directed  -Continue hydralazine as directed  -Continue crestor as directed for now. 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 Noted in 2015.   Plan:   -Monitor BP and HR  -Continue plavix as directed  -Hospice upon discharge.   -Follow up with cardiology as directed    Total time spent with patient visit at  the skilled nursing facility was 60 min including patient visit and review of past records. Greater than 50% of total time spent with counseling and coordinating care with nursing staff due to the complexities of their diagnoses, review of HPI, development of POC, assisting HUC on appointment schedules, and patient education and review of POC with patient which includes 30 min discussion with patient on: current medications/orders changes, recent blood work results, continued discharge plan/needs, subsequent treatment plan while in TCU and thereafter, current pain control plan and controlled substances ordered.     Electronically signed by:  Tasneem Silverio DNP, APRN

## 2021-07-01 NOTE — PLAN OF CARE
"/56 (BP Location: Left arm)   Pulse 69   Temp 97.9  F (36.6  C) (Oral)   Resp 16   Ht 1.651 m (5' 5\")   Wt 107.2 kg (236 lb 5.3 oz)   SpO2 95%   BMI 39.33 kg/m      Care from: [id number]    VSS ex hypotensive- resolved with 500 mL NS bolus, on room air, denied pain. A&O x4. Infrequent productive cough, diminished lung sounds in BLL. 1+ edema noted in BLE. 300 mL urine output from [place], no BM this shift. Good appetite and oral intake, denied nausea. Blanchable redness noted at coccyx and perineum- applied barrier cream and turned q2h. Generalized weakness, assist of 2, gait belt and walker. Call light within reach and bed alarm on. Continue to follow poc. Plan to discharge to TCU tomorrow.  "

## 2021-07-01 NOTE — PLAN OF CARE
Discharged to: TCU  [facility]  Time: 0900  Prescriptions: None  Belongings: Sent with patient  PIV/Access: Removed prior to discharge  Care Plan and Education discontinued: Yes  Paperwork: Packet sent with patient to facility.

## 2021-07-02 NOTE — PROGRESS NOTES
Clinic Care Coordination Contact  Care Coordination Transition Communication         Clinical Data: Patient was hospitalized at [facility] from 6/19/21 to 7/1/21 with diagnosis of Adenocarcinoma, acute respiratory failure.     Transition to Facility:              Facility Name: [facility]               Contact name and phone number/fax:     Plan: RN/SW Care Coordinator will await notification from facility staff informing RN/SW Care Coordinator of patient's discharge plans/needs. RN/SW Care Coordinator will review chart and outreach to facility staff every 4 weeks and as needed.     Eleuterio Sun MSN, RN, PHN, CCM   Primary Care Clinical RN Care Coordinator  [facility]  7/2/2021   9:20 AM  [email or web address]  Office: [phone]

## 2021-07-02 NOTE — PROGRESS NOTES
[facility]  [facility] Medical Record Number:  [id number]  Place of Service where encounter took place:  [facility] ([facility]) [[id number]]  Chief Complaint   Patient presents with     RECHECK       HPI:    Bárbara Vuong  is a 86 year old (1935), who is being seen today for an episodic care visit.  HPI information obtained from: facility chart records, facility staff, patient report, [facility] chart review and Care Everywhere [facility] chart review. Today's concern is:     Physical deconditioning  Generalized muscle weakness  Metastatic adenocarcinoma (H)  Malignant pleural effusion  Acute respiratory failure with hypoxia (H)  Obstructive pneumonia  Hyperparathyroidism (H)  Hypercalcemia  Thyroid nodule  Chronic hyponatremia  Hypertension goal BP (blood pressure) < 130/80  Stage 3 chronic kidney disease, unspecified whether stage 3a or 3b CKD  Hyperlipidemia, unspecified hyperlipidemia type  History of embolic stroke  CARDIOVASCULAR SCREENING; LDL GOAL LESS THAN 130  Adenocarcinoma (H)     Met with patient who denies any chest pain, palpitations, shortness of breath, GARCIA, lightheadedness, dizziness, or cough. Denies any abdominal discomfort. Denies N&V. Denies B&B concerns. Denies dysuria or frequency. Denies loose or constipation. Appetite good. Sleeping well.  Nursing denies any acute concerns today.     BP Readings from Last 3 Encounters:   07/05/21 134/82   07/01/21 106/60   07/01/21 (!) 147/61     Wt Readings from Last 5 Encounters:   07/05/21 106.3 kg (234 lb 4.8 oz)   07/01/21 106.7 kg (235 lb 3.2 oz)   06/30/21 107.2 kg (236 lb 5.3 oz)   06/25/21 103.4 kg (228 lb)   03/12/21 107.5 kg (237 lb)     Past Medical and Surgical History reviewed in Epic today.    MEDICATIONS:    Current Outpatient Medications   Medication Sig Dispense Refill     albuterol (PROAIR HFA/PROVENTIL HFA/VENTOLIN HFA) 108 (90 Base) MCG/ACT inhaler Inhale 2 puffs into the lungs every 6 hours 8 g 3     amLODIPine  "(NORVASC) 10 MG tablet Take 1 tablet (10 mg) by mouth daily 90 tablet 1     clopidogrel (PLAVIX) 75 MG tablet TAKE 1 TABLET BY MOUTH ONCE DAILY 90 tablet 1     diphenhydrAMINE (BENADRYL) 25 MG capsule Take 1 capsule (25 mg) by mouth nightly as needed for sleep 60 capsule 0     hydrALAZINE (APRESOLINE) 25 MG tablet TAKE 1.5 TABLETS BY MOUTH ONCE DAILY, 135 tablet 1     losartan (COZAAR) 100 MG tablet Take 1 tablet (100 mg) by mouth daily 90 tablet 1     oxyCODONE (ROXICODONE) 5 MG tablet Take 0.5-1 tablets (2.5-5 mg) by mouth every 6 hours as needed for moderate to severe pain Take 2.5mg for pain 1-5; take 5mg for pain >6 60 tablet 0     rosuvastatin (CRESTOR) 5 MG tablet TAKE A 1/2 TABLET (2.5MG) BY MOUTH EVERY EVENING 45 tablet 3     REVIEW OF SYSTEMS:  10 point ROS of systems including Constitutional, Eyes, Respiratory, Cardiovascular, Gastroenterology, Genitourinary, Integumentary, Musculoskeletal, Psychiatric were all negative except for pertinent positives noted in my HPI.    Objective:  /82   Pulse 100   Temp 98.5  F (36.9  C)   Resp 16   Ht 1.651 m (5' 5\")   Wt 106.3 kg (234 lb 4.8 oz)   SpO2 93%   BMI 38.99 kg/m    Exam:  GENERAL APPEARANCE:  Alert, in no distress, oriented, cooperative  ENT:  Mouth and posterior oropharynx normal, moist mucous membranes, [place]  EYES:  EOM, conjunctivae, lids, pupils and irises normal  NECK:  No adenopathy,masses or thyromegaly  RESP:  respiratory effort and palpation of chest normal, no respiratory distress, diminished breath sounds bibasilar  CV:  Palpation and auscultation of heart done , regular rate and rhythm, no murmur, rub, or gallop, peripheral edema 1+ in BLE  ABDOMEN:  normal bowel sounds, soft, nontender, no hepatosplenomegaly or other masses, no guarding or rebound  M/S:   wheelchair for main mobility needs  SKIN:  Inspection of skin and subcutaneous tissue baseline, Palpation of skin and subcutaneous tissue baseline  NEURO:   Cranial nerves 2-12 are " normal tested and grossly at patient's baseline, no purposeful movement in upper and lower extremities  PSYCH:  oriented X 3, memory impaired , affect and mood normal    Labs:     Most Recent 3 CBC's:  Recent Labs   Lab Test 06/24/21  0633 06/23/21  0451 06/22/21  0601   WBC 8.2 6.7 10.0   HGB 14.4 14.8 15.2   MCV 91 88 88    350 379     Most Recent 3 BMP's:  Recent Labs   Lab Test 06/27/21  0529 06/26/21  1939 06/24/21  0633 06/23/21  0451 06/22/21  0601   NA  --   --  127* 128* 128*   POTASSIUM 4.7 5.1 4.5 5.0 4.5   CHLORIDE  --   --  97 98 96   CO2  --   --  23 23 26   BUN  --   --  25 22 25   CR  --   --  0.93 0.96 0.84   ANIONGAP  --   --  7 7 6   RACHEL  --   --  9.6 9.4 9.8   GLC  --   --  102* 153* 98     Most Recent 2 LFT's:  Recent Labs   Lab Test 06/19/21  1047 06/18/21  1420   AST 13 15   ALT 17 20   ALKPHOS 91 89   BILITOTAL 0.4 0.4     Most Recent Anemia Panel:  Recent Labs   Lab Test 06/24/21  0633   WBC 8.2   HGB 14.4   HCT 45.7   MCV 91          ASSESSMENT/PLAN:  (R53.81) Physical deconditioning  (primary encounter diagnosis)  (M62.81) Generalized muscle weakness  Comment: Acute on chronic. S/T below diagnosis  Plan:   -Continue Physical therapy and Occupational therapy  -SW to remain involve for safe discharge planning needs  -Recommend hospice at discharge when ready     (C79.9) Metastatic adenocarcinoma (H)  (J91.0) Malignant pleural effusion  (J96.01) Acute respiratory failure with hypoxia (H)  (J18.9) Obstructive pneumonia  Comment: She presented with hypoxia and large right pleural effusion. Underwent diagnostic and therapeutic thoracentesis with negative bacterial or fungal growth to date, exudative in nature. Cytology unfortunately came back positive for likely adenocarcinoma, suspect lung primary. She was treated with levaquin and prednisone for likely post-obstructive pneumonia given her wheezing. PET CT demonstrated hypermetabolic areas upper lobe of right lung, right hilar and  mediastinal lymph nodes, and scattered osseous mets (axial, appendicular, pelvis, spine). Pleurex catheter was placed 6/29 for her recurrent effusion by interventional pulmonary, with small right apical pneumothorax (non-hypoxic), that was treated with oxygen. Oncology was consulted and kindly assisted with management. A care conference was held with Estela and her son, Sachin, who both expressed wishes for no further biopsy, no aggressive treatments at this time, and plan to transition to hospice after rehabilitation and Sachin's wedding. Code status was also discussed with patient and they expressed Full Code status currently, and will revisit this as an outpatient after family has had time to come to terms with these news. Social work reached out to TCU who will assist patient with enrollment into Hospice.   Plan:   -Continue to monitor pain   -Monitor for worsening s/sx of concerns  -Monitor respiratory status  -Albuterol inhaler Q 6hours scheduled  -Continue PRN oxycodone as directed.   -Pleurex cath as directed with cares. Detailed written orders and patient insurance information for drainage kits have been completed and faxed to [employer], ATTN: Nette Johnson.  [employer] can be reached at [phone]. Staff to contact her for additional supplies when needed. She was discharged with 4 bottles.   -Perform daily drain from pleurex catheter daily for 3 months. Do not remove more than 1000mL each time.      (E21.3) Hyperparathyroidism (H)  (E83.52) Hypercalcemia  (E04.1) Thyroid nodule  Comment: Acute on chronic. Suspect malignancy related vs primary. Family and patient would not like to pursue further treatments. Discussed the possibility of constipation in conjunction with opioids.   Plan:   -Monitor for worsening s/sx of concerns  -Hospice upon discharge     (E87.1) Chronic hyponatremia  Comment: Baseline 130-132 for the last 2 years, stable. Serum osmolality 278,  urine osmolality 699, and urine sodium 72, concern for glucocorticoid deficiency. Cosyntropin stim test with appropriate cortisol response. Likely related to malignancy.  Plan:   -Monitor for worsening s/sx of concerns  -Hospice upon discharge     (I10) Hypertension goal BP (blood pressure) < 130/80  (N18.30) Stage 3 chronic kidney disease, unspecified whether stage 3a or 3b CKD  (E78.5) Hyperlipidemia, unspecified hyperlipidemia type  Comment: Acute on chronic. Based on JNC-8 goals,  patients age of 86 year old, presence of diabetes or CKD, and goals of care goal BP is  <140/90 mm Hg. Patient is stable with current plan of care and routine assessment.. Baseline creatinine~0.84-1.10  Plan:   -Monitor BP and HR as directed  -Continue losartan as directed  -Continue amlodipine as directed  -Continue hydralazine as directed  -Daily weights as directed. Admit weight# 235  -Continue crestor as directed for now. 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 Noted in 2015.   Plan:   -Monitor BP and HR  -Continue plavix as directed  -Hospice upon discharge.   -Follow up with cardiology as directed     Electronically signed by:  Tasneem Silverio DNP, APRN

## 2021-07-05 NOTE — LETTER
7/5/2021        RE: Bárbara Vuong  [address] [facility] [place] [address]        [place] [facility]  [place] Medical Record Number:  [id number]  Place of Service where encounter took place:  [facility] ([facility] [place]) [[id number]]  Chief Complaint   Patient presents with     RECHECK       HPI:    Bárbara Vuong  is a 86 year old (1935), who is being seen today for an episodic care visit.  HPI information obtained from: facility chart records, facility staff, patient report, [facility] chart review and Care Everywhere [facility] chart review. Today's concern is:     Physical deconditioning  Generalized muscle weakness  Metastatic adenocarcinoma (H)  Malignant pleural effusion  Acute respiratory failure with hypoxia (H)  Obstructive pneumonia  Hyperparathyroidism (H)  Hypercalcemia  Thyroid nodule  Chronic hyponatremia  Hypertension goal BP (blood pressure) < 130/80  Stage 3 chronic kidney disease, unspecified whether stage 3a or 3b CKD  Hyperlipidemia, unspecified hyperlipidemia type  History of embolic stroke  CARDIOVASCULAR SCREENING; LDL GOAL LESS THAN 130  Adenocarcinoma (H)     Met with patient who denies any chest pain, palpitations, shortness of breath, GARCIA, lightheadedness, dizziness, or cough. Denies any abdominal discomfort. Denies N&V. Denies B&B concerns. Denies dysuria or frequency. Denies loose or constipation. Appetite good. Sleeping well.  Nursing denies any acute concerns today.     BP Readings from Last 3 Encounters:   07/05/21 134/82   07/01/21 106/60   07/01/21 (!) 147/61     Wt Readings from Last 5 Encounters:   07/05/21 106.3 kg (234 lb 4.8 oz)   07/01/21 106.7 kg (235 lb 3.2 oz)   06/30/21 107.2 kg (236 lb 5.3 oz)   06/25/21 103.4 kg (228 lb)   03/12/21 107.5 kg (237 lb)     Past Medical and Surgical History reviewed in Epic today.    MEDICATIONS:    Current Outpatient Medications   Medication Sig Dispense Refill     albuterol (PROAIR HFA/PROVENTIL HFA/VENTOLIN HFA)  "108 (90 Base) MCG/ACT inhaler Inhale 2 puffs into the lungs every 6 hours 8 g 3     amLODIPine (NORVASC) 10 MG tablet Take 1 tablet (10 mg) by mouth daily 90 tablet 1     clopidogrel (PLAVIX) 75 MG tablet TAKE 1 TABLET BY MOUTH ONCE DAILY 90 tablet 1     diphenhydrAMINE (BENADRYL) 25 MG capsule Take 1 capsule (25 mg) by mouth nightly as needed for sleep 60 capsule 0     hydrALAZINE (APRESOLINE) 25 MG tablet TAKE 1.5 TABLETS BY MOUTH ONCE DAILY, 135 tablet 1     losartan (COZAAR) 100 MG tablet Take 1 tablet (100 mg) by mouth daily 90 tablet 1     oxyCODONE (ROXICODONE) 5 MG tablet Take 0.5-1 tablets (2.5-5 mg) by mouth every 6 hours as needed for moderate to severe pain Take 2.5mg for pain 1-5; take 5mg for pain >6 60 tablet 0     rosuvastatin (CRESTOR) 5 MG tablet TAKE A 1/2 TABLET (2.5MG) BY MOUTH EVERY EVENING 45 tablet 3     REVIEW OF SYSTEMS:  10 point ROS of systems including Constitutional, Eyes, Respiratory, Cardiovascular, Gastroenterology, Genitourinary, Integumentary, Musculoskeletal, Psychiatric were all negative except for pertinent positives noted in my HPI.    Objective:  /82   Pulse 100   Temp 98.5  F (36.9  C)   Resp 16   Ht 1.651 m (5' 5\")   Wt 106.3 kg (234 lb 4.8 oz)   SpO2 93%   BMI 38.99 kg/m    Exam:  GENERAL APPEARANCE:  Alert, in no distress, oriented, cooperative  ENT:  Mouth and posterior oropharynx normal, moist mucous membranes, [place]  EYES:  EOM, conjunctivae, lids, pupils and irises normal  NECK:  No adenopathy,masses or thyromegaly  RESP:  respiratory effort and palpation of chest normal, no respiratory distress, diminished breath sounds bibasilar  CV:  Palpation and auscultation of heart done , regular rate and rhythm, no murmur, rub, or gallop, peripheral edema 1+ in BLE  ABDOMEN:  normal bowel sounds, soft, nontender, no hepatosplenomegaly or other masses, no guarding or rebound  M/S:   wheelchair for main mobility needs  SKIN:  Inspection of skin and subcutaneous tissue " baseline, Palpation of skin and subcutaneous tissue baseline  NEURO:   Cranial nerves 2-12 are normal tested and grossly at patient's baseline, no purposeful movement in upper and lower extremities  PSYCH:  oriented X 3, memory impaired , affect and mood normal    Labs:     Most Recent 3 CBC's:  Recent Labs   Lab Test 06/24/21  0633 06/23/21  0451 06/22/21  0601   WBC 8.2 6.7 10.0   HGB 14.4 14.8 15.2   MCV 91 88 88    350 379     Most Recent 3 BMP's:  Recent Labs   Lab Test 06/27/21  0529 06/26/21  1939 06/24/21  0633 06/23/21  0451 06/22/21  0601   NA  --   --  127* 128* 128*   POTASSIUM 4.7 5.1 4.5 5.0 4.5   CHLORIDE  --   --  97 98 96   CO2  --   --  23 23 26   BUN  --   --  25 22 25   CR  --   --  0.93 0.96 0.84   ANIONGAP  --   --  7 7 6   RACHEL  --   --  9.6 9.4 9.8   GLC  --   --  102* 153* 98     Most Recent 2 LFT's:  Recent Labs   Lab Test 06/19/21  1047 06/18/21  1420   AST 13 15   ALT 17 20   ALKPHOS 91 89   BILITOTAL 0.4 0.4     Most Recent Anemia Panel:  Recent Labs   Lab Test 06/24/21  0633   WBC 8.2   HGB 14.4   HCT 45.7   MCV 91          ASSESSMENT/PLAN:  (R53.81) Physical deconditioning  (primary encounter diagnosis)  (M62.81) Generalized muscle weakness  Comment: Acute on chronic. S/T below diagnosis  Plan:   -Continue Physical therapy and Occupational therapy  -SW to remain involve for safe discharge planning needs  -Recommend hospice at discharge when ready     (C79.9) Metastatic adenocarcinoma (H)  (J91.0) Malignant pleural effusion  (J96.01) Acute respiratory failure with hypoxia (H)  (J18.9) Obstructive pneumonia  Comment: She presented with hypoxia and large right pleural effusion. Underwent diagnostic and therapeutic thoracentesis with negative bacterial or fungal growth to date, exudative in nature. Cytology unfortunately came back positive for likely adenocarcinoma, suspect lung primary. She was treated with levaquin and prednisone for likely post-obstructive pneumonia given  her wheezing. PET CT demonstrated hypermetabolic areas upper lobe of right lung, right hilar and mediastinal lymph nodes, and scattered osseous mets (axial, appendicular, pelvis, spine). Pleurex catheter was placed 6/29 for her recurrent effusion by interventional pulmonary, with small right apical pneumothorax (non-hypoxic), that was treated with oxygen. Oncology was consulted and kindly assisted with management. A care conference was held with Estela and her son, Sachin, who both expressed wishes for no further biopsy, no aggressive treatments at this time, and plan to transition to hospice after rehabilitation and Sachin's wedding. Code status was also discussed with patient and they expressed Full Code status currently, and will revisit this as an outpatient after family has had time to come to terms with these news. Social work reached out to TCU who will assist patient with enrollment into Hospice.   Plan:   -Continue to monitor pain   -Monitor for worsening s/sx of concerns  -Monitor respiratory status  -Albuterol inhaler Q 6hours scheduled  -Continue PRN oxycodone as directed.   -Pleurex cath as directed with cares. Detailed written orders and patient insurance information for drainage kits have been completed and faxed to [employer], ATTN: Nette Johnson.  [employer] Customer Service can be reached at [phone]. Staff to contact her for additional supplies when needed. She was discharged with 4 bottles.   -Perform daily drain from pleurex catheter daily for 3 months. Do not remove more than 1000mL each time.      (E21.3) Hyperparathyroidism (H)  (E83.52) Hypercalcemia  (E04.1) Thyroid nodule  Comment: Acute on chronic. Suspect malignancy related vs primary. Family and patient would not like to pursue further treatments. Discussed the possibility of constipation in conjunction with opioids.   Plan:   -Monitor for worsening s/sx of concerns  -Hospice upon discharge     (E87.1)  Chronic hyponatremia  Comment: Baseline 130-132 for the last 2 years, stable. Serum osmolality 278, urine osmolality 699, and urine sodium 72, concern for glucocorticoid deficiency. Cosyntropin stim test with appropriate cortisol response. Likely related to malignancy.  Plan:   -Monitor for worsening s/sx of concerns  -Hospice upon discharge     (I10) Hypertension goal BP (blood pressure) < 130/80  (N18.30) Stage 3 chronic kidney disease, unspecified whether stage 3a or 3b CKD  (E78.5) Hyperlipidemia, unspecified hyperlipidemia type  Comment: Acute on chronic. Based on JNC-8 goals,  patients age of 86 year old, presence of diabetes or CKD, and goals of care goal BP is  <140/90 mm Hg. Patient is stable with current plan of care and routine assessment.. Baseline creatinine~0.84-1.10  Plan:   -Monitor BP and HR as directed  -Continue losartan as directed  -Continue amlodipine as directed  -Continue hydralazine as directed  -Daily weights as directed. Admit weight# 235  -Continue crestor as directed for now. 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 Noted in 2015.   Plan:   -Monitor BP and HR  -Continue plavix as directed  -Hospice upon discharge.   -Follow up with cardiology as directed     Electronically signed by:  Tasneem Silverio DNP, APRN        Sincerely,        CHRIS Jackson CNP

## 2021-07-08 NOTE — TELEPHONE ENCOUNTER
Cardiology referral for Afib. Left a message for patient to call back to schedule an appointment. Patient can see Dr. Sandhu.    LIAN Perry

## 2021-07-08 NOTE — PROGRESS NOTES
[place] [facility]  PRIMARY CARE PROVIDER AND CLINIC:  Janis Louis DO, [address] [facility] [address]  Chief Complaint   Patient presents with     Hospital F/U     [place] Medical Record Number:  [id number]  Place of Service where encounter took place:  [facility] ([facility]) [[id number]]    Bárbara Vuong  is a 86 year old  (1935), admitted to the above facility from  [facility]. Hospital stay 6/19/21 through 7/1/21..  Admitted to this facility for  rehab, medical management and nursing care.    HPI:    HPI information obtained from: facility staff, patient report and [facility] chart review.   Brief Summary of Hospital Course:   - pt with PMH notable embolic CVA, hospitalized with acute respiratory failure 2/2 large right pleural effusion, was found to have adenocarcinoma (primary vs mets), s/p PleurX catheter placement, treated with levaquin and  prednisone for query obstructive pna.  PET scan revealed spine osseous mets as well      Today:  - Resp failure/  Rt PleurX : reports breathing is fine, denies SOB. occasional cough no wheezing.   - Lung cancer and mets: denies chest pain.   - Rehab: reports it is going ok. Reports yesterday requried a help to get up from the sitting on the toilet. Reports uses a walker with one assist.     ========================    CODE STATUS/ADVANCE DIRECTIVES DISCUSSION:   CPR/Full code   Patient's living condition: lives alone  ALLERGIES: Penicillins and Melatonin  PAST MEDICAL HISTORY:  has a past medical history of CARDIOVASCULAR SCREENING; LDL GOAL LESS THAN 130 (5/9/2010), CVA (cerebral infarction), Generalized osteoarthrosis, unspecified site, History of blood transfusion, Hypertension goal BP (blood pressure) < 130/80 (2/17/2011), 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  PAST SURGICAL HISTORY:   has a past surgical history that includes NONSPECIFIC PROCEDURE; NONSPECIFIC  "PROCEDURE; NONSPECIFIC PROCEDURE; NONSPECIFIC PROCEDURE; and Insert chest tube (Right, 6/29/2021).  FAMILY HISTORY: family history includes C.A.D. in her mother; Hypertension in her mother.  SOCIAL HISTORY:   reports that she quit smoking about 33 years ago. Her smoking use included cigarettes. She has a 7.00 pack-year smoking history. She has never used smokeless tobacco. She reports current alcohol use of about 7.0 standard drinks of alcohol per week. She reports that she does not use drugs.    Post Discharge Medication Reconciliation Status: discharge medications reconciled and changed, per note/orders  Current Outpatient Medications   Medication Sig Dispense Refill     albuterol (PROAIR HFA/PROVENTIL HFA/VENTOLIN HFA) 108 (90 Base) MCG/ACT inhaler Inhale 2 puffs into the lungs every 6 hours 8 g 3     amLODIPine (NORVASC) 10 MG tablet Take 1 tablet (10 mg) by mouth daily 90 tablet 1     clopidogrel (PLAVIX) 75 MG tablet TAKE 1 TABLET BY MOUTH ONCE DAILY 90 tablet 1     diphenhydrAMINE (BENADRYL) 25 MG capsule Take 1 capsule (25 mg) by mouth nightly as needed for sleep 60 capsule 0     hydrALAZINE (APRESOLINE) 25 MG tablet TAKE 1.5 TABLETS BY MOUTH ONCE DAILY, 135 tablet 1     losartan (COZAAR) 100 MG tablet Take 1 tablet (100 mg) by mouth daily 90 tablet 1     oxyCODONE (ROXICODONE) 5 MG tablet Take 0.5-1 tablets (2.5-5 mg) by mouth every 6 hours as needed for moderate to severe pain Take 2.5mg for pain 1-5; take 5mg for pain >6 60 tablet 0     rosuvastatin (CRESTOR) 5 MG tablet TAKE A 1/2 TABLET (2.5MG) BY MOUTH EVERY EVENING 45 tablet 3     ROS:  10 point ROS of systems including Constitutional, Eyes, Respiratory, Cardiovascular, Gastroenterology, Genitourinary, Integumentary, Musculoskeletal, Psychiatric were all negative except for pertinent positives noted in my HPI.    Vitals:  /71   Pulse 83   Temp 98.2  F (36.8  C)   Resp 16   Ht 1.651 m (5' 5\")   Wt 103.8 kg (228 lb 14.4 oz)   SpO2 92%   BMI " 38.09 kg/m    Exam:  GENERAL APPEARANCE:  in no distress, cooperative  ENT: , moist mucous membranes, oral mucosa moist, no lesion noted.   EYES:  EOMI, Pupil rounded and equal.  RESP: good air entry b/l, coarse sounds over bases b/l. No wheezing  CV:  S1S2 audible, regular HR, no murmur appreciated.   ABDOMEN:  soft, NT/ND, BS audible. no mass appreciated on palpation.   M/S:   no joint deformity noted on observation.   SKIN:  No rash.   NEURO:   No NFD appreciated on observation. Hand  5/5 b/l  PSYCH:  normal insight, judgement and memory, affect and mood normal      Lab/Diagnostic data: Reviewed in the chart and EHR.        ASSESSMENT/PLAN:  --------------------------------  Malignant Pleural Effusion s/p PleurX catheter placement  Lung adenocarcinoma (metastatic vs primary), RUL, Rt hilar, and mediastinal LN  Bone mets (H)  Thyroid nodule  Debility:  - Palliative care approach. Plan to rehab, then enroll in hospice care. Still full code.   - requires assistance with ADLs, from the nursing staff  -  Analgesia and dyspnea control    Chronic hyponatremia: no concern    Essential HTN: BP over controled. On norvasc, hydralazine and losartan. No survival benefit from tight control of BP. Consider stopping losartan, and monitor.   BP Readings from Last 3 Encounters:   07/08/21 112/71   07/05/21 134/82   07/01/21 106/60       Hx of embolic CVA: on rosuvastatin and plavix. No survival benefit. Consider stopping statin, once enrolled in hospice, recommend stopping plavix.       Order: See above, otherwise, continue the rest of the current POC.       Electronically signed by:  Parisa Haley MD

## 2021-07-09 NOTE — LETTER
7/9/2021        RE: Bárbara Vuong  [address]   [place] [address]        [place] [facility]  PRIMARY CARE PROVIDER AND CLINIC:  Janis Louis DO, [address] [facility] [address]  Chief Complaint   Patient presents with     Hospital F/U     [place] Medical Record Number:  [id number]  Place of Service where encounter took place:  [facility] ([facility]) [[id number]]    Bárbara Vuong  is a 86 year old  (1935), admitted to the above facility from  [facility]. Hospital stay 6/19/21 through 7/1/21..  Admitted to this facility for  rehab, medical management and nursing care.    HPI:    HPI information obtained from: facility staff, patient report and [facility] chart review.   Brief Summary of Hospital Course:   - pt with PMH notable embolic CVA, hospitalized with acute respiratory failure 2/2 large right pleural effusion, was found to have adenocarcinoma (primary vs mets), s/p PleurX catheter placement, treated with levaquin and  prednisone for query obstructive pna.  PET scan revealed spine osseous mets as well      Today:  - Resp failure/  Rt PleurX : reports breathing is fine, denies SOB. occasional cough no wheezing.   - Lung cancer and mets: denies chest pain.   - Rehab: reports it is going ok. Reports yesterday requried a help to get up from the sitting on the toilet. Reports uses a walker with one assist.     ========================    CODE STATUS/ADVANCE DIRECTIVES DISCUSSION:   CPR/Full code   Patient's living condition: lives alone  ALLERGIES: Penicillins and Melatonin  PAST MEDICAL HISTORY:  has a past medical history of CARDIOVASCULAR SCREENING; LDL GOAL LESS THAN 130 (5/9/2010), CVA (cerebral infarction), Generalized osteoarthrosis, unspecified site, History of blood transfusion, Hypertension goal BP (blood pressure) < 130/80 (2/17/2011), 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  PAST  SURGICAL HISTORY:   has a past surgical history that includes NONSPECIFIC PROCEDURE; NONSPECIFIC PROCEDURE; NONSPECIFIC PROCEDURE; NONSPECIFIC PROCEDURE; and Insert chest tube (Right, 6/29/2021).  FAMILY HISTORY: family history includes C.A.D. in her mother; Hypertension in her mother.  SOCIAL HISTORY:   reports that she quit smoking about 33 years ago. Her smoking use included cigarettes. She has a 7.00 pack-year smoking history. She has never used smokeless tobacco. She reports current alcohol use of about 7.0 standard drinks of alcohol per week. She reports that she does not use drugs.    Post Discharge Medication Reconciliation Status: discharge medications reconciled and changed, per note/orders  Current Outpatient Medications   Medication Sig Dispense Refill     albuterol (PROAIR HFA/PROVENTIL HFA/VENTOLIN HFA) 108 (90 Base) MCG/ACT inhaler Inhale 2 puffs into the lungs every 6 hours 8 g 3     amLODIPine (NORVASC) 10 MG tablet Take 1 tablet (10 mg) by mouth daily 90 tablet 1     clopidogrel (PLAVIX) 75 MG tablet TAKE 1 TABLET BY MOUTH ONCE DAILY 90 tablet 1     diphenhydrAMINE (BENADRYL) 25 MG capsule Take 1 capsule (25 mg) by mouth nightly as needed for sleep 60 capsule 0     hydrALAZINE (APRESOLINE) 25 MG tablet TAKE 1.5 TABLETS BY MOUTH ONCE DAILY, 135 tablet 1     losartan (COZAAR) 100 MG tablet Take 1 tablet (100 mg) by mouth daily 90 tablet 1     oxyCODONE (ROXICODONE) 5 MG tablet Take 0.5-1 tablets (2.5-5 mg) by mouth every 6 hours as needed for moderate to severe pain Take 2.5mg for pain 1-5; take 5mg for pain >6 60 tablet 0     rosuvastatin (CRESTOR) 5 MG tablet TAKE A 1/2 TABLET (2.5MG) BY MOUTH EVERY EVENING 45 tablet 3     ROS:  10 point ROS of systems including Constitutional, Eyes, Respiratory, Cardiovascular, Gastroenterology, Genitourinary, Integumentary, Musculoskeletal, Psychiatric were all negative except for pertinent positives noted in my HPI.    Vitals:  /71   Pulse 83   Temp 98.2  " F (36.8  C)   Resp 16   Ht 1.651 m (5' 5\")   Wt 103.8 kg (228 lb 14.4 oz)   SpO2 92%   BMI 38.09 kg/m    Exam:  GENERAL APPEARANCE:  in no distress, cooperative  ENT: , moist mucous membranes, oral mucosa moist, no lesion noted.   EYES:  EOMI, Pupil rounded and equal.  RESP: good air entry b/l, coarse sounds over bases b/l. No wheezing  CV:  S1S2 audible, regular HR, no murmur appreciated.   ABDOMEN:  soft, NT/ND, BS audible. no mass appreciated on palpation.   M/S:   no joint deformity noted on observation.   SKIN:  No rash.   NEURO:   No NFD appreciated on observation. Hand  5/5 b/l  PSYCH:  normal insight, judgement and memory, affect and mood normal      Lab/Diagnostic data: Reviewed in the chart and EHR.        ASSESSMENT/PLAN:  --------------------------------  Malignant Pleural Effusion s/p PleurX catheter placement  Lung adenocarcinoma (metastatic vs primary), RUL, Rt hilar, and mediastinal LN  Bone mets (H)  Thyroid nodule  Debility:  - Palliative care approach. Plan to rehab, then enroll in hospice care. Still full code.   - requires assistance with ADLs, from the nursing staff  -  Analgesia and dyspnea control    Chronic hyponatremia: no concern    Essential HTN: BP over controled. On norvasc, hydralazine and losartan. No survival benefit from tight control of BP. Consider stopping losartan, and monitor.   BP Readings from Last 3 Encounters:   07/08/21 112/71   07/05/21 134/82   07/01/21 106/60       Hx of embolic CVA: on rosuvastatin and plavix. No survival benefit. Consider stopping statin, once enrolled in hospice, recommend stopping plavix.       Order: See above, otherwise, continue the rest of the current POC.       Electronically signed by:  Parisa Haley MD         Sincerely,        Parisa Haley MD    "

## 2021-07-11 NOTE — PROGRESS NOTES
[facility]  [place] Medical Record Number:  [id number]  Place of Service where encounter took place:  [facility]  AT [facility] ([facility]) [[id number]]  Chief Complaint   Patient presents with     RECHECK       HPI:    Bárbara Vuong  is a 86 year old (1935), who is being seen today for an episodic care visit.  HPI information obtained from: facility chart records, facility staff, patient report, [facility] chart review and Care Everywhere Epic chart review. Today's concern is:     Physical deconditioning  Generalized muscle weakness  Metastatic adenocarcinoma (H)  Malignant pleural effusion  Acute respiratory failure with hypoxia (H)  Obstructive pneumonia  Hyperparathyroidism (H)  Hypercalcemia  Thyroid nodule  Chronic hyponatremia  Hypertension goal BP (blood pressure) < 130/80  Stage 3 chronic kidney disease, unspecified whether stage 3a or 3b CKD  Hyperlipidemia, unspecified hyperlipidemia type  History of embolic stroke  CARDIOVASCULAR SCREENING; LDL GOAL LESS THAN 130  Adenocarcinoma (H)    Per care conference note on 7/6/21: Admission care conference held today. Present guest, son Sachin, Adolfo PT, and NTC. Dalia admitted post hospitalization for SOB/respiratory failure due to pleural effusion, was found to have lung cancer with mets to several locations, does have a pleural vac, has history of CVA, obesity, was a former smoker. Dalia lives alone in a two level home, has an adjustable bed/bed rail/wheelchair/fww/grab bars/shower bench/chair/RTS/chair lift/lift chair. Dalia was ind with ADL's and IADL's up until 2 weeks before hospitalization where adlia was requiring more assist, a family member was providing AM/PM cares for compression socks and to insure up/to bed OK. Dalia's goal is to get more independent and then go home on hospice. PT goals ambulate 10' fww min A, bed mobility mod A, transfers mod A, sit-stand mod A, improved balance score. OT goals dressing setup, complete home  exercise program SBA, stand 2 minutes, complete cognitive testing. Currently PT-bed mobility max A2, sit-stand/transfers EZ-stand is not safe at this time, EZ lift with staff, working on standing tolerance, no ambulating at this time. OT-UE min-max A, LE max A, grooming/hygiene sitting setup setup, toileting max A. Therapy to continue 2-3 weeks. Discharge plan is for guest to go home on hospice and private pay service to meet guest's needs. Discussed possible discharge equipment like a sit-stand device which would be needed on both floors, guest is not willing to convert dinning room into a bed room at this time. Guest stated to writer that didn't really want to be here, wanted to go home to enjoy life. Son kept telling guest that needed to work on improving self otherwise would need more help in the home. Noted, guest look to son to answer many of the questions that were asked of guest like prior level of function, what wanted from therapy, etc. Medications reviewed. Progress care conference scheduled 7/13 at 8:30am.    Met with patient who denies any chest pain, palpitations, shortness of breath, GARCIA, lightheadedness, dizziness, or cough. Denies any abdominal discomfort. Denies N&V. Denies B&B concerns. Denies dysuria or frequency. Denies loose or constipation. Appetite good. Sleeping well.  Nursing reports for the last 3 days of having only 20mL output in pleurex catheter. Nursing called pulmonology to update to assess for further orders.     BP Readings from Last 3 Encounters:   07/12/21 133/74   07/08/21 112/71   07/05/21 134/82     Wt Readings from Last 5 Encounters:   07/12/21 101.7 kg (224 lb 4.8 oz)   07/08/21 103.8 kg (228 lb 14.4 oz)   07/05/21 106.3 kg (234 lb 4.8 oz)   07/01/21 106.7 kg (235 lb 3.2 oz)   06/30/21 107.2 kg (236 lb 5.3 oz)     Past Medical and Surgical History reviewed in Epic today.    MEDICATIONS:    Current Outpatient Medications   Medication Sig Dispense Refill     acetaminophen  "(TYLENOL) 500 MG tablet Take 2 tablets (1,000 mg) by mouth every 6 hours as needed for mild pain 240 tablet 0     albuterol (PROAIR HFA/PROVENTIL HFA/VENTOLIN HFA) 108 (90 Base) MCG/ACT inhaler Inhale 2 puffs into the lungs every 6 hours 8 g 3     amLODIPine (NORVASC) 10 MG tablet Take 1 tablet (10 mg) by mouth daily 90 tablet 1     clopidogrel (PLAVIX) 75 MG tablet TAKE 1 TABLET BY MOUTH ONCE DAILY 90 tablet 1     hydrALAZINE (APRESOLINE) 25 MG tablet TAKE 1.5 TABLETS BY MOUTH ONCE DAILY, 135 tablet 1     losartan (COZAAR) 100 MG tablet Take 1 tablet (100 mg) by mouth daily 90 tablet 1     rosuvastatin (CRESTOR) 5 MG tablet TAKE A 1/2 TABLET (2.5MG) BY MOUTH EVERY EVENING 45 tablet 3     REVIEW OF SYSTEMS:  10 point ROS of systems including Constitutional, Eyes, Respiratory, Cardiovascular, Gastroenterology, Genitourinary, Integumentary, Musculoskeletal, Psychiatric were all negative except for pertinent positives noted in my HPI.    Objective:  /74   Pulse 94   Temp 96.9  F (36.1  C)   Resp 16   Ht 1.651 m (5' 5\")   Wt 101.7 kg (224 lb 4.8 oz)   SpO2 92%   BMI 37.33 kg/m    Exam:  GENERAL APPEARANCE:  Alert, in no distress, cooperative  ENT:  Mouth and posterior oropharynx normal, moist mucous membranes, [place]  EYES:  EOM, conjunctivae, lids, pupils and irises normal  NECK:  No adenopathy,masses or thyromegaly  RESP:  respiratory effort and palpation of chest normal, lungs clear to auscultation , no respiratory distress  CV:  Palpation and auscultation of heart done , regular rate and rhythm, no murmur, rub, or gallop, no edema, +2 pedal pulses  ABDOMEN:  normal bowel sounds, soft, nontender, no hepatosplenomegaly or other masses, no guarding or rebound  M/S:   Easy stand transfers. Wheelchair bound.   SKIN:  Inspection of skin and subcutaneous tissue baseline, Palpation of skin and subcutaneous tissue baseline  NEURO:   Cranial nerves 2-12 are normal tested and grossly at patient's baseline, no " purposeful movement in upper and lower extremities  PSYCH:  oriented X 3, memory impaired , affect and mood normal    Labs:   Patient is on hospice/palliative care and labs are not recommended       ASSESSMENT/PLAN:  (R53.81) Physical deconditioning  (primary encounter diagnosis)  (M62.81) Generalized muscle weakness  Comment: Acute on chronic. S/T below diagnosis  Plan:   -Continue Physical therapy and Occupational therapy  -SW to remain involve for safe discharge planning needs  -Recommend hospice at discharge when ready     (C79.9) Metastatic adenocarcinoma (H)  (J91.0) Malignant pleural effusion  (J96.01) Acute respiratory failure with hypoxia (H)  (J18.9) Obstructive pneumonia  Comment: She presented with hypoxia and large right pleural effusion. Underwent diagnostic and therapeutic thoracentesis with negative bacterial or fungal growth to date, exudative in nature. Cytology unfortunately came back positive for likely adenocarcinoma, suspect lung primary. She was treated with levaquin and prednisone for likely post-obstructive pneumonia given her wheezing. PET CT demonstrated hypermetabolic areas upper lobe of right lung, right hilar and mediastinal lymph nodes, and scattered osseous mets (axial, appendicular, pelvis, spine). Pleurex catheter was placed 6/29 for her recurrent effusion by interventional pulmonary, with small right apical pneumothorax (non-hypoxic), that was treated with oxygen. Oncology was consulted and kindly assisted with management. A care conference was held with Estela and her son, Sachin, who both expressed wishes for no further biopsy, no aggressive treatments at this time, and plan to transition to hospice after rehabilitation and Sachin's wedding. Code status was also discussed with patient and they expressed Full Code status currently, and will revisit this as an outpatient after family has had time to come to terms with these news. Social work reached out to TCU who will assist  patient with enrollment into Hospice. Per Registered Nurse on TCU- output to pleurex catheter has been 20mL for the last few days.   Plan:   -Continue to monitor pain   -Monitor for worsening s/sx of concerns  -Monitor respiratory status  -Albuterol inhaler Q 6hours scheduled  -Discontinue PRN oxycodone due to non use.   -Start Tylenol 1000mg every 6 hour PRN for pain management needs for now.   -Due to limited output, will change drain care to: drain pleurex catheter every other day. if no drainage for 3 consecutive days then please contact Tonie nKott at [phone]. Per pulmonology continue this for 3 months. Do not remove more than 1000mL each time  -Perform pleurex catheter dressing cares every other day after use. Do not remove more than 1000mL each time.      (E21.3) Hyperparathyroidism (H)  (E83.52) Hypercalcemia  (E04.1) Thyroid nodule  Comment: Acute on chronic. Suspect malignancy related vs primary. Family and patient would not like to pursue further treatments. Discussed the possibility of constipation in conjunction with opioids.   Plan:   -Monitor for worsening s/sx of concerns  -Hospice upon discharge     (E87.1) Chronic hyponatremia  Comment: Baseline 130-132 for the last 2 years, stable. Serum osmolality 278, urine osmolality 699, and urine sodium 72, concern for glucocorticoid deficiency. Cosyntropin stim test with appropriate cortisol response. Likely related to malignancy.  Plan:   -Monitor for worsening s/sx of concerns  -Hospice upon discharge     (I10) Hypertension goal BP (blood pressure) < 130/80  (N18.30) Stage 3 chronic kidney disease, unspecified whether stage 3a or 3b CKD  (E78.5) Hyperlipidemia, unspecified hyperlipidemia type  Comment: Acute on chronic. Based on JNC-8 goals,  patients age of 86 year old, presence of diabetes or CKD, and goals of care goal BP is  <140/90 mm Hg. Patient is stable with current plan of care and routine assessment.. Baseline creatinine~0.84-1.10. Most  recent weight 224#.   Plan:   -Monitor BP and HR as directed  -Continue losartan as directed  -Continue amlodipine as directed  -Continue hydralazine as directed  -Daily weights as directed. Admit weight# 235  -Continue crestor as directed for now. Recommend to discontinue once hospice admitted  -Follow up with cardiology as directed post TCU     (Z86.73) History of embolic stroke  Comment: Chronic. Noted in 2015.   Plan:   -Monitor BP and HR  -Continue plavix as directed  -Hospice upon discharge.   -Follow up with cardiology as directed for TCU     Electronically signed by:  Tasneem Silverio DNP, APRN

## 2021-07-12 NOTE — LETTER
7/12/2021        RE: Bárbara Vuong  [address] [place] [address]        [facility]  [place] Medical Record Number:  [id number]  Place of Service where encounter took place:  [facility] [facility] ([facility]) [[id number]]  Chief Complaint   Patient presents with     RECHECK       HPI:    Bárbara Vuong  is a 86 year old (1935), who is being seen today for an episodic care visit.  HPI information obtained from: facility chart records, facility staff, patient report, [facility] chart review and Care Everywhere [facility] chart review. Today's concern is:     Physical deconditioning  Generalized muscle weakness  Metastatic adenocarcinoma (H)  Malignant pleural effusion  Acute respiratory failure with hypoxia (H)  Obstructive pneumonia  Hyperparathyroidism (H)  Hypercalcemia  Thyroid nodule  Chronic hyponatremia  Hypertension goal BP (blood pressure) < 130/80  Stage 3 chronic kidney disease, unspecified whether stage 3a or 3b CKD  Hyperlipidemia, unspecified hyperlipidemia type  History of embolic stroke  CARDIOVASCULAR SCREENING; LDL GOAL LESS THAN 130  Adenocarcinoma (H)    Per care conference note on 7/6/21: Admission care conference held today. Present dalia, son Sachin, Adolfo PT, and NTC. Dalia admitted post hospitalization for SOB/respiratory failure due to pleural effusion, was found to have lung cancer with mets to several locations, does have a pleural vac, has history of CVA, obesity, was a former smoker. Dalia lives alone in a two level home, has an adjustable bed/bed rail/wheelchair/fww/grab bars/shower bench/chair/RTS/chair lift/lift chair. Dalia was ind with ADL's and IADL's up until 2 weeks before hospitalization where dalia was requiring more assist, a family member was providing AM/PM cares for compression socks and to insure up/to bed OK. Dalia's goal is to get more independent and then go home on hospice. PT goals ambulate 10' fww min A, bed mobility mod A,  transfers mod A, sit-stand mod A, improved balance score. OT goals dressing setup, complete home exercise program SBA, stand 2 minutes, complete cognitive testing. Currently PT-bed mobility max A2, sit-stand/transfers EZ-stand is not safe at this time, EZ lift with staff, working on standing tolerance, no ambulating at this time. OT-UE min-max A, LE max A, grooming/hygiene sitting setup setup, toileting max A. Therapy to continue 2-3 weeks. Discharge plan is for guest to go home on hospice and private pay service to meet guest's needs. Discussed possible discharge equipment like a sit-stand device which would be needed on both floors, guest is not willing to convert dinning room into a bed room at this time. Guest stated to writer that didn't really want to be here, wanted to go home to enjoy life. Son kept telling guest that needed to work on improving self otherwise would need more help in the home. Noted, guest look to son to answer many of the questions that were asked of guest like prior level of function, what wanted from therapy, etc. Medications reviewed. Progress care conference scheduled 7/13 at 8:30am.    Met with patient who denies any chest pain, palpitations, shortness of breath, GARCIA, lightheadedness, dizziness, or cough. Denies any abdominal discomfort. Denies N&V. Denies B&B concerns. Denies dysuria or frequency. Denies loose or constipation. Appetite good. Sleeping well.  Nursing reports for the last 3 days of having only 20mL output in pleurex catheter. Nursing called pulmonology to update to assess for further orders.     BP Readings from Last 3 Encounters:   07/12/21 133/74   07/08/21 112/71   07/05/21 134/82     Wt Readings from Last 5 Encounters:   07/12/21 101.7 kg (224 lb 4.8 oz)   07/08/21 103.8 kg (228 lb 14.4 oz)   07/05/21 106.3 kg (234 lb 4.8 oz)   07/01/21 106.7 kg (235 lb 3.2 oz)   06/30/21 107.2 kg (236 lb 5.3 oz)     Past Medical and Surgical History reviewed in Epic  "today.    MEDICATIONS:    Current Outpatient Medications   Medication Sig Dispense Refill     acetaminophen (TYLENOL) 500 MG tablet Take 2 tablets (1,000 mg) by mouth every 6 hours as needed for mild pain 240 tablet 0     albuterol (PROAIR HFA/PROVENTIL HFA/VENTOLIN HFA) 108 (90 Base) MCG/ACT inhaler Inhale 2 puffs into the lungs every 6 hours 8 g 3     amLODIPine (NORVASC) 10 MG tablet Take 1 tablet (10 mg) by mouth daily 90 tablet 1     clopidogrel (PLAVIX) 75 MG tablet TAKE 1 TABLET BY MOUTH ONCE DAILY 90 tablet 1     hydrALAZINE (APRESOLINE) 25 MG tablet TAKE 1.5 TABLETS BY MOUTH ONCE DAILY, 135 tablet 1     losartan (COZAAR) 100 MG tablet Take 1 tablet (100 mg) by mouth daily 90 tablet 1     rosuvastatin (CRESTOR) 5 MG tablet TAKE A 1/2 TABLET (2.5MG) BY MOUTH EVERY EVENING 45 tablet 3     REVIEW OF SYSTEMS:  10 point ROS of systems including Constitutional, Eyes, Respiratory, Cardiovascular, Gastroenterology, Genitourinary, Integumentary, Musculoskeletal, Psychiatric were all negative except for pertinent positives noted in my HPI.    Objective:  /74   Pulse 94   Temp 96.9  F (36.1  C)   Resp 16   Ht 1.651 m (5' 5\")   Wt 101.7 kg (224 lb 4.8 oz)   SpO2 92%   BMI 37.33 kg/m    Exam:  GENERAL APPEARANCE:  Alert, in no distress, cooperative  ENT:  Mouth and posterior oropharynx normal, moist mucous membranes, [place]  EYES:  EOM, conjunctivae, lids, pupils and irises normal  NECK:  No adenopathy,masses or thyromegaly  RESP:  respiratory effort and palpation of chest normal, lungs clear to auscultation , no respiratory distress  CV:  Palpation and auscultation of heart done , regular rate and rhythm, no murmur, rub, or gallop, no edema, +2 pedal pulses  ABDOMEN:  normal bowel sounds, soft, nontender, no hepatosplenomegaly or other masses, no guarding or rebound  M/S:   Easy stand transfers. Wheelchair bound.   SKIN:  Inspection of skin and subcutaneous tissue baseline, Palpation of skin and subcutaneous " tissue baseline  NEURO:   Cranial nerves 2-12 are normal tested and grossly at patient's baseline, no purposeful movement in upper and lower extremities  PSYCH:  oriented X 3, memory impaired , affect and mood normal    Labs:   Patient is on hospice/palliative care and labs are not recommended       ASSESSMENT/PLAN:  (R53.81) Physical deconditioning  (primary encounter diagnosis)  (M62.81) Generalized muscle weakness  Comment: Acute on chronic. S/T below diagnosis  Plan:   -Continue Physical therapy and Occupational therapy  -SW to remain involve for safe discharge planning needs  -Recommend hospice at discharge when ready     (C79.9) Metastatic adenocarcinoma (H)  (J91.0) Malignant pleural effusion  (J96.01) Acute respiratory failure with hypoxia (H)  (J18.9) Obstructive pneumonia  Comment: She presented with hypoxia and large right pleural effusion. Underwent diagnostic and therapeutic thoracentesis with negative bacterial or fungal growth to date, exudative in nature. Cytology unfortunately came back positive for likely adenocarcinoma, suspect lung primary. She was treated with levaquin and prednisone for likely post-obstructive pneumonia given her wheezing. PET CT demonstrated hypermetabolic areas upper lobe of right lung, right hilar and mediastinal lymph nodes, and scattered osseous mets (axial, appendicular, pelvis, spine). Pleurex catheter was placed 6/29 for her recurrent effusion by interventional pulmonary, with small right apical pneumothorax (non-hypoxic), that was treated with oxygen. Oncology was consulted and kindly assisted with management. A care conference was held with Estela and her son, Sachin, who both expressed wishes for no further biopsy, no aggressive treatments at this time, and plan to transition to hospice after rehabilitation and Sachin's wedding. Code status was also discussed with patient and they expressed Full Code status currently, and will revisit this as an outpatient after  family has had time to come to terms with these news. Social work reached out to TCU who will assist patient with enrollment into Hospice. Per Registered Nurse on TCU- output to pleurex catheter has been 20mL for the last few days.   Plan:   -Continue to monitor pain   -Monitor for worsening s/sx of concerns  -Monitor respiratory status  -Albuterol inhaler Q 6hours scheduled  -Discontinue PRN oxycodone due to non use.   -Start Tylenol 1000mg every 6 hour PRN for pain management needs for now.   -Due to limited output, will change drain care to: drain pleurex catheter every other day. if no drainage for 3 consecutive days then please contact Tonie Knott at [phone]. Per pulmonology continue this for 3 months. Do not remove more than 1000mL each time  -Perform pleurex catheter dressing cares every other day after use. Do not remove more than 1000mL each time.      (E21.3) Hyperparathyroidism (H)  (E83.52) Hypercalcemia  (E04.1) Thyroid nodule  Comment: Acute on chronic. Suspect malignancy related vs primary. Family and patient would not like to pursue further treatments. Discussed the possibility of constipation in conjunction with opioids.   Plan:   -Monitor for worsening s/sx of concerns  -Hospice upon discharge     (E87.1) Chronic hyponatremia  Comment: Baseline 130-132 for the last 2 years, stable. Serum osmolality 278, urine osmolality 699, and urine sodium 72, concern for glucocorticoid deficiency. Cosyntropin stim test with appropriate cortisol response. Likely related to malignancy.  Plan:   -Monitor for worsening s/sx of concerns  -Hospice upon discharge     (I10) Hypertension goal BP (blood pressure) < 130/80  (N18.30) Stage 3 chronic kidney disease, unspecified whether stage 3a or 3b CKD  (E78.5) Hyperlipidemia, unspecified hyperlipidemia type  Comment: Acute on chronic. Based on JNC-8 goals,  patients age of 86 year old, presence of diabetes or CKD, and goals of care goal BP is  <140/90 mm  Hg. Patient is stable with current plan of care and routine assessment.. Baseline creatinine~0.84-1.10. Most recent weight 224#.   Plan:   -Monitor BP and HR as directed  -Continue losartan as directed  -Continue amlodipine as directed  -Continue hydralazine as directed  -Daily weights as directed. Admit weight# 235  -Continue crestor as directed for now. Recommend to discontinue once hospice admitted  -Follow up with cardiology as directed post TCU     (Z86.73) History of embolic stroke  Comment: Chronic. Noted in 2015.   Plan:   -Monitor BP and HR  -Continue plavix as directed  -Hospice upon discharge.   -Follow up with cardiology as directed for TCU     Electronically signed by:  Tasneem Silverio DNP, APRN        Sincerely,        CHRIS Jackson CNP

## 2021-07-13 NOTE — TELEPHONE ENCOUNTER
Returned call to Carolyn at [facility] in regards to this patient's PleurX catheter output and frequency of drainage.  Drainage has been decreasing over time.  Friday 60 ml was drained, over the weekend 20 ml was drained and now they are draining every other day.  Today 10 ml was drained.  They will continue with every other day draining and keep us updated.

## 2021-07-16 NOTE — TELEPHONE ENCOUNTER
----- Message from Karen Dunaway PT sent at 2021  4:46 PM CDT -----  Regarding: clarifying wc eval information  Karel Willis and Dr. Louis,   I was contacted today because Ms. Vuong and her son were advised that they needed a new PT assessment for her wheelchair. Ms. Vuong has already received a full wheelchair assessment and the equipment has been ordered, so no additional visit is needed. The wheelchair vendor, [employer], sent over a letter of medical necessity and a standard written order that were completed by Chaparrita Wilson, the OT who completed the wheelchair assessment.     Dr. Louis, if you could please sign and date that paperwork and send it back to [employer], that will complete the ordering process on our end and they can process the request from there.    I am currently covering for Chaparrita while she is on a [employer]N, so please let me know if you have further questions about this. I am in the clinic on Tuesday/Thursday afternoons.     Thank you!  Karen Dunaway, PT, DPT, NCS, ATP  Physical Therapist  [facility]  [address]  [address] [place] [address]  [email or web address]  [phone]  Fax: [phone]

## 2021-07-19 NOTE — PROGRESS NOTES
"[facility] GERIATRIC SERVICES    Chief Complaint   Patient presents with     RECHECK     HPI:  Bárbara Vuong is a 86 year old  (1935), who is being seen today for an episodic care visit at: [facility] AT [facility] ([facility]) [[id number]]. Today's concern is:     The primary encounter diagnosis was Physical deconditioning. Diagnoses of Generalized muscle weakness, Metastatic adenocarcinoma (H), Malignant pleural effusion, Acute respiratory failure with hypoxia (H), Obstructive pneumonia, Hyperparathyroidism (H), Hypercalcemia, Thyroid nodule, Chronic hyponatremia, Hypertension goal BP (blood pressure) < 130/80, Stage 3 chronic kidney disease, unspecified whether stage 3a or 3b CKD, Hyperlipidemia, unspecified hyperlipidemia type, History of embolic stroke, CARDIOVASCULAR SCREENING; LDL GOAL LESS THAN 130, Adenocarcinoma (H), Bone metastasis (H), and History of CVA (cerebrovascular accident) were also pertinent to this visit.    Met with patient who denies any chest pain, palpitations, shortness of breath, GARCIA, lightheadedness, dizziness, or cough. Denies any abdominal discomfort. Denies N&V. Denies B&B concerns. Denies dysuria or frequency. Denies loose or constipation. Appetite good. Sleeping well. Poor participation with therapies at times. Needs constant encouragement. Per care conference note on 7/13/21:  PT-sit-stand EZ stand, noted guest C/O pain in knees with use also \"hangs\" until straight legs, sit-stand bars max A2, transfers mod-max A2, bed mobility max A, no ambulating at this time, tolerating standing up to 45 seconds, working on strengthening. OT-grooming/hygiene sitting min A, UE supervision-SBA, LE max A, toileting max A, started the CPT. Discussed need to simulate or do things that would need to do at home. Therapy to continue 2 weeks. Son reported that stair left will be extended in about two weeks. Discussed discharge at this time and guest doesn't want to change the first level into a " "bedroom/bathroom, is willing to work on improving so that doesn't happen. Discussed home safety evaluation and timing. Progress care conference scheduled 7/27 at 8:30am    BP Readings from Last 3 Encounters:   07/19/21 130/69   07/12/21 133/74   07/08/21 112/71     Wt Readings from Last 5 Encounters:   07/19/21 102.8 kg (226 lb 9.6 oz)   07/12/21 101.7 kg (224 lb 4.8 oz)   07/08/21 103.8 kg (228 lb 14.4 oz)   07/05/21 106.3 kg (234 lb 4.8 oz)   07/01/21 106.7 kg (235 lb 3.2 oz)     Allergies, and PMH/PSH reviewed in EPIC today.  REVIEW OF SYSTEMS:  10 point ROS of systems including Constitutional, Eyes, Respiratory, Cardiovascular, Gastroenterology, Genitourinary, Integumentary, Musculoskeletal, Psychiatric were all negative except for pertinent positives noted in my HPI.    Objective:   /69   Pulse 104   Temp 98  F (36.7  C)   Resp 17   Ht 1.651 m (5' 5\")   Wt 102.8 kg (226 lb 9.6 oz)   SpO2 93%   BMI 37.71 kg/m    GENERAL APPEARANCE:  Alert, in no distress, morbidly obese, cooperative  ENT:  Mouth and posterior oropharynx normal, moist mucous membranes, [place]  EYES:  EOM, conjunctivae, lids, pupils and irises normal  NECK:  No adenopathy,masses or thyromegaly  RESP:  respiratory effort and palpation of chest normal, lungs clear to auscultation , no respiratory distress  CV:  Palpation and auscultation of heart done , regular rate and rhythm, no murmur, rub, or gallop, no edema, +2 pedal pulses  ABDOMEN:  normal bowel sounds, soft, nontender, no hepatosplenomegaly or other masses, no guarding or rebound  M/S:   No ambulation. Maximum assist with all ADLS, transfers and cares. Wheelchair for main mobilty needs  SKIN:  Inspection of skin and subcutaneous tissue baseline, Palpation of skin and subcutaneous tissue baseline  NEURO:   Cranial nerves 2-12 are normal tested and grossly at patient's baseline, no purposeful movement in upper and lower extremities  PSYCH:  oriented X 3, memory impaired , affect and " mood normal    Patient is on hospice/palliative care and labs are not recommended    ASSESSMENT/PLAN:  (R53.81) Physical deconditioning  (primary encounter diagnosis)  (M62.81) Generalized muscle weakness  Comment: Acute on chronic. S/T below diagnosis  Plan:   -Continue Physical therapy and Occupational therapy  -SW to remain involve for safe discharge planning needs  -Recommend hospice at discharge when ready     (C79.9) Metastatic adenocarcinoma (H)  (J91.0) Malignant pleural effusion  (J96.01) Acute respiratory failure with hypoxia (H)  (J18.9) Obstructive pneumonia  Comment: She presented with hypoxia and large right pleural effusion. Underwent diagnostic and therapeutic thoracentesis with negative bacterial or fungal growth to date, exudative in nature. Cytology unfortunately came back positive for likely adenocarcinoma, suspect lung primary. She was treated with levaquin and prednisone for likely post-obstructive pneumonia given her wheezing. PET CT demonstrated hypermetabolic areas upper lobe of right lung, right hilar and mediastinal lymph nodes, and scattered osseous mets (axial, appendicular, pelvis, spine). Pleurex catheter was placed 6/29 for her recurrent effusion by interventional pulmonary, with small right apical pneumothorax (non-hypoxic), that was treated with oxygen. Oncology was consulted and kindly assisted with management. A care conference was held with Estela and her son, Sachin, who both expressed wishes for no further biopsy, no aggressive treatments at this time, and plan to transition to hospice after rehabilitation and Sachin's wedding. Code status was also discussed with patient and they expressed Full Code status currently, and will revisit this as an outpatient after family has had time to come to terms with these news. Social work reached out to TCU who will assist patient with enrollment into Hospice. Per Registered Nurse on TCU- output to pleurex catheter has been 20mL for the  last few days.   Plan:   -Continue to monitor pain   -Monitor for worsening s/sx of concerns  -Monitor respiratory status  -Albuterol inhaler Q 6hours scheduled  -Continue  Tylenol 1000mg every 6 hour PRN for pain management needs for now.   -Due to limited output, will change drain care to: drain pleurex catheter twice a week (tuesdays and saturdays) if no drainage for2 consecutive weeks then please contact  contact Barb Howard -[phone]. Per pulmonology continue this for 3 months. Do not remove more than 1000mL each time  -Perform pleurex catheter dressing cares twice a week after use. Do not remove more than 1000mL each time.      (E21.3) Hyperparathyroidism (H)  (E83.52) Hypercalcemia  (E04.1) Thyroid nodule  Comment: Acute on chronic. Suspect malignancy related vs primary. Family and patient would not like to pursue further treatments. Discussed the possibility of constipation in conjunction with opioids.   Plan:   -Monitor for worsening s/sx of concerns  -Hospice upon discharge     (E87.1) Chronic hyponatremia  Comment: Baseline 130-132 for the last 2 years, stable. Serum osmolality 278, urine osmolality 699, and urine sodium 72, concern for glucocorticoid deficiency. Cosyntropin stim test with appropriate cortisol response. Likely related to malignancy.  Plan:   -Monitor for worsening s/sx of concerns  -Hospice upon discharge     (I10) Hypertension goal BP (blood pressure) < 130/80  (N18.30) Stage 3 chronic kidney disease, unspecified whether stage 3a or 3b CKD  (E78.5) Hyperlipidemia, unspecified hyperlipidemia type  Comment: Acute on chronic. Based on JNC-8 goals,  patients age of 86 year old, presence of diabetes or CKD, and goals of care goal BP is  <140/90 mm Hg. Patient is stable with current plan of care and routine assessment.. Baseline creatinine~0.84-1.10. Most recent weight 223#.   Plan:   -Monitor BP and HR as directed  -Continue losartan as directed  -Continue amlodipine as  directed  -Continue hydralazine as directed  -Daily weights as directed. Admit weight# 235  -Continue crestor as directed for now. Recommend to discontinue once hospice admitted  -Follow up with cardiology as directed post TCU     (Z86.73) History of embolic stroke  Comment: Chronic. Noted in 2015.   Plan:   -Monitor BP and HR  -Continue plavix as directed  -Hospice upon discharge.   -Follow up with cardiology as directed for TCU     Electronically signed by:  Tasneem Silverio DNP, APRN

## 2021-07-19 NOTE — LETTER
"    7/19/2021        RE: Bárbara Vuong  [address] [id number]        [facility] GERIATRIC SERVICES    Chief Complaint   Patient presents with     RECHECK     HPI:  Bárbara Vuong is a 86 year old  (1935), who is being seen today for an episodic care visit at: [facility] AT [facility] [[id number]]. Today's concern is:     The primary encounter diagnosis was Physical deconditioning. Diagnoses of Generalized muscle weakness, Metastatic adenocarcinoma (H), Malignant pleural effusion, Acute respiratory failure with hypoxia (H), Obstructive pneumonia, Hyperparathyroidism (H), Hypercalcemia, Thyroid nodule, Chronic hyponatremia, Hypertension goal BP (blood pressure) < 130/80, Stage 3 chronic kidney disease, unspecified whether stage 3a or 3b CKD, Hyperlipidemia, unspecified hyperlipidemia type, History of embolic stroke, CARDIOVASCULAR SCREENING; LDL GOAL LESS THAN 130, Adenocarcinoma (H), Bone metastasis (H), and History of CVA (cerebrovascular accident) were also pertinent to this visit.    Met with patient who denies any chest pain, palpitations, shortness of breath, GARCIA, lightheadedness, dizziness, or cough. Denies any abdominal discomfort. Denies N&V. Denies B&B concerns. Denies dysuria or frequency. Denies loose or constipation. Appetite good. Sleeping well. Poor participation with therapies at times. Needs constant encouragement. Per care conference note on 7/13/21:  PT-sit-stand EZ stand, noted guest C/O pain in knees with use also \"hangs\" until straight legs, sit-stand bars max A2, transfers mod-max A2, bed mobility max A, no ambulating at this time, tolerating standing up to 45 seconds, working on strengthening. OT-grooming/hygiene sitting min A, UE supervision-SBA, LE max A, toileting max A, started the CPT. Discussed need to simulate or do things that would need to do at home. Therapy to continue 2 weeks. Son reported that stair left will be extended in about two weeks. Discussed " "discharge at this time and guest doesn't want to change the first level into a bedroom/bathroom, is willing to work on improving so that doesn't happen. Discussed home safety evaluation and timing. Progress care conference scheduled 7/27 at 8:30am    BP Readings from Last 3 Encounters:   07/19/21 130/69   07/12/21 133/74   07/08/21 112/71     Wt Readings from Last 5 Encounters:   07/19/21 102.8 kg (226 lb 9.6 oz)   07/12/21 101.7 kg (224 lb 4.8 oz)   07/08/21 103.8 kg (228 lb 14.4 oz)   07/05/21 106.3 kg (234 lb 4.8 oz)   07/01/21 106.7 kg (235 lb 3.2 oz)     Allergies, and PMH/PSH reviewed in EPIC today.  REVIEW OF SYSTEMS:  10 point ROS of systems including Constitutional, Eyes, Respiratory, Cardiovascular, Gastroenterology, Genitourinary, Integumentary, Musculoskeletal, Psychiatric were all negative except for pertinent positives noted in my HPI.    Objective:   /69   Pulse 104   Temp 98  F (36.7  C)   Resp 17   Ht 1.651 m (5' 5\")   Wt 102.8 kg (226 lb 9.6 oz)   SpO2 93%   BMI 37.71 kg/m    GENERAL APPEARANCE:  Alert, in no distress, morbidly obese, cooperative  ENT:  Mouth and posterior oropharynx normal, moist mucous membranes, [place]  EYES:  EOM, conjunctivae, lids, pupils and irises normal  NECK:  No adenopathy,masses or thyromegaly  RESP:  respiratory effort and palpation of chest normal, lungs clear to auscultation , no respiratory distress  CV:  Palpation and auscultation of heart done , regular rate and rhythm, no murmur, rub, or gallop, no edema, +2 pedal pulses  ABDOMEN:  normal bowel sounds, soft, nontender, no hepatosplenomegaly or other masses, no guarding or rebound  M/S:   No ambulation. Maximum assist with all ADLS, transfers and cares. Wheelchair for main mobilty needs  SKIN:  Inspection of skin and subcutaneous tissue baseline, Palpation of skin and subcutaneous tissue baseline  NEURO:   Cranial nerves 2-12 are normal tested and grossly at patient's baseline, no purposeful movement in " upper and lower extremities  PSYCH:  oriented X 3, memory impaired , affect and mood normal    Patient is on hospice/palliative care and labs are not recommended    ASSESSMENT/PLAN:  (R53.81) Physical deconditioning  (primary encounter diagnosis)  (M62.81) Generalized muscle weakness  Comment: Acute on chronic. S/T below diagnosis  Plan:   -Continue Physical therapy and Occupational therapy  -SW to remain involve for safe discharge planning needs  -Recommend hospice at discharge when ready     (C79.9) Metastatic adenocarcinoma (H)  (J91.0) Malignant pleural effusion  (J96.01) Acute respiratory failure with hypoxia (H)  (J18.9) Obstructive pneumonia  Comment: She presented with hypoxia and large right pleural effusion. Underwent diagnostic and therapeutic thoracentesis with negative bacterial or fungal growth to date, exudative in nature. Cytology unfortunately came back positive for likely adenocarcinoma, suspect lung primary. She was treated with levaquin and prednisone for likely post-obstructive pneumonia given her wheezing. PET CT demonstrated hypermetabolic areas upper lobe of right lung, right hilar and mediastinal lymph nodes, and scattered osseous mets (axial, appendicular, pelvis, spine). Pleurex catheter was placed 6/29 for her recurrent effusion by interventional pulmonary, with small right apical pneumothorax (non-hypoxic), that was treated with oxygen. Oncology was consulted and kindly assisted with management. A care conference was held with Estela and her son, Sachin, who both expressed wishes for no further biopsy, no aggressive treatments at this time, and plan to transition to hospice after rehabilitation and Sachin's wedding. Code status was also discussed with patient and they expressed Full Code status currently, and will revisit this as an outpatient after family has had time to come to terms with these news. Social work reached out to TCU who will assist patient with enrollment into  Hospice. Per Registered Nurse on TCU- output to pleurex catheter has been 20mL for the last few days.   Plan:   -Continue to monitor pain   -Monitor for worsening s/sx of concerns  -Monitor respiratory status  -Albuterol inhaler Q 6hours scheduled  -Continue  Tylenol 1000mg every 6 hour PRN for pain management needs for now.   -Due to limited output, will change drain care to: drain pleurex catheter twice a week (tuesdays and saturdays) if no drainage for2 consecutive weeks then please contact  contact Barb Howard -[phone]. Per pulmonology continue this for 3 months. Do not remove more than 1000mL each time  -Perform pleurex catheter dressing cares twice a week after use. Do not remove more than 1000mL each time.      (E21.3) Hyperparathyroidism (H)  (E83.52) Hypercalcemia  (E04.1) Thyroid nodule  Comment: Acute on chronic. Suspect malignancy related vs primary. Family and patient would not like to pursue further treatments. Discussed the possibility of constipation in conjunction with opioids.   Plan:   -Monitor for worsening s/sx of concerns  -Hospice upon discharge     (E87.1) Chronic hyponatremia  Comment: Baseline 130-132 for the last 2 years, stable. Serum osmolality 278, urine osmolality 699, and urine sodium 72, concern for glucocorticoid deficiency. Cosyntropin stim test with appropriate cortisol response. Likely related to malignancy.  Plan:   -Monitor for worsening s/sx of concerns  -Hospice upon discharge     (I10) Hypertension goal BP (blood pressure) < 130/80  (N18.30) Stage 3 chronic kidney disease, unspecified whether stage 3a or 3b CKD  (E78.5) Hyperlipidemia, unspecified hyperlipidemia type  Comment: Acute on chronic. Based on JNC-8 goals,  patients age of 86 year old, presence of diabetes or CKD, and goals of care goal BP is  <140/90 mm Hg. Patient is stable with current plan of care and routine assessment.. Baseline creatinine~0.84-1.10. Most recent weight 223#.   Plan:   -Monitor BP and HR  as directed  -Continue losartan as directed  -Continue amlodipine as directed  -Continue hydralazine as directed  -Daily weights as directed. Admit weight# 235  -Continue crestor as directed for now. Recommend to discontinue once hospice admitted  -Follow up with cardiology as directed post TCU     (Z86.73) History of embolic stroke  Comment: Chronic. Noted in 2015.   Plan:   -Monitor BP and HR  -Continue plavix as directed  -Hospice upon discharge.   -Follow up with cardiology as directed for TCU     Electronically signed by:  Tasneem Silverio DNP, APRN        Sincerely,        Tasneem Silverio, CHRIS CNP

## 2021-07-25 NOTE — PROGRESS NOTES
"[facility] GERIATRIC SERVICES    Chief Complaint   Patient presents with     RECHECK     HPI:  Bárbara Vuong is a 86 year old  (1935), who is being seen today for an episodic care visit at: [facility] AT [facility] ([facility]) [[id number]]. Today's concern is:     The primary encounter diagnosis was Physical deconditioning. Diagnoses of Generalized muscle weakness, Metastatic adenocarcinoma (H), Malignant pleural effusion, Acute respiratory failure with hypoxia (H), Obstructive pneumonia, Hyperparathyroidism (H), Hypercalcemia, Thyroid nodule, Chronic hyponatremia, Hypertension goal BP (blood pressure) < 130/80, Stage 3 chronic kidney disease, unspecified whether stage 3a or 3b CKD, Hyperlipidemia, unspecified hyperlipidemia type, History of embolic stroke, CARDIOVASCULAR SCREENING; LDL GOAL LESS THAN 130, Adenocarcinoma (H), Bone metastasis (H), and History of CVA (cerebrovascular accident) were also pertinent to this visit.    Met with patient who denies any chest pain, palpitations, shortness of breath, GARCIA, lightheadedness, dizziness, or cough. Denies any abdominal discomfort. Denies N&V. Denies B&B concerns. Denies dysuria or frequency. Denies loose or constipation. Appetite good. Sleeping well.      Per care conference on 7/13/21: Progress care conference held today. Present guest, son Sachin, Leilani PT, and NTC. PT-sit-stand EZ stand, noted guest C/O pain in knees with use also \"hangs\" until straight legs, sit-stand bars max A2, transfers mod-max A2, bed mobility max A, no ambulating at this time, tolerating standing up to 45 seconds, working on strengthening. OT-grooming/hygiene sitting min A, UE supervision-SBA, LE max A, toileting max A, started the CPT. Discussed need to simulate or do things that would need to do at home. Therapy to continue 2 weeks. Son reported that stair left will be extended in about two weeks. Discussed discharge at this time and guest doesn't want to change the first level into a " "bedroom/bathroom, is willing to work on improving so that doesn't happen. Discussed home safety evaluation and timing. Progress care conference scheduled 7/27 at 8:30am.    BP Readings from Last 3 Encounters:   07/26/21 122/71   07/19/21 130/69   07/12/21 133/74     Wt Readings from Last 5 Encounters:   07/26/21 101.8 kg (224 lb 8 oz)   07/19/21 102.8 kg (226 lb 9.6 oz)   07/12/21 101.7 kg (224 lb 4.8 oz)   07/08/21 103.8 kg (228 lb 14.4 oz)   07/05/21 106.3 kg (234 lb 4.8 oz)     Allergies, and PMH/PSH reviewed in EPIC today.  REVIEW OF SYSTEMS:  10 point ROS of systems including Constitutional, Eyes, Respiratory, Cardiovascular, Gastroenterology, Genitourinary, Integumentary, Musculoskeletal, Psychiatric were all negative except for pertinent positives noted in my HPI.    Objective:   /71   Pulse 95   Temp 98.3  F (36.8  C)   Resp 17   Ht 1.651 m (5' 5\")   Wt 101.8 kg (224 lb 8 oz)   SpO2 90%   BMI 37.36 kg/m    GENERAL APPEARANCE:  Alert, in no distress, oriented, cooperative  ENT:  Mouth and posterior oropharynx normal, moist mucous membranes, [place]  EYES:  EOM, conjunctivae, lids, pupils and irises normal  NECK:  No adenopathy,masses or thyromegaly  RESP:  respiratory effort and palpation of chest normal, no respiratory distress, diminished breath sounds bibasilar, expiratory wheezes  CV:  Palpation and auscultation of heart done , regular rate and rhythm, no murmur, rub, or gallop, no edema, +2 pedal pulses  ABDOMEN:  normal bowel sounds, soft, nontender, no hepatosplenomegaly or other masses, no guarding or rebound  M/S:   REquires eazy stand for transfers. Wheelchair bound  SKIN:  Inspection of skin and subcutaneous tissue baseline, Palpation of skin and subcutaneous tissue baseline  NEURO:   Cranial nerves 2-12 are normal tested and grossly at patient's baseline, no purposeful movement in upper and lower extremities  PSYCH:  oriented X 3, memory impaired , affect and mood normal    Patient is on " hospice/palliative care and labs are not recommended    ASSESSMENT/PLAN:  (R53.81) Physical deconditioning  (primary encounter diagnosis)  (M62.81) Generalized muscle weakness  Comment: Acute on chronic. S/T below diagnosis  Plan:   -Continue Physical therapy and Occupational therapy  -SW to remain involve for safe discharge planning needs  -Hospice planned to start at discharge     (C79.9) Metastatic adenocarcinoma (H)  (J91.0) Malignant pleural effusion  (J96.01) Acute respiratory failure with hypoxia (H)  (J18.9) Obstructive pneumonia  Comment: She presented with hypoxia and large right pleural effusion. Underwent diagnostic and therapeutic thoracentesis with negative bacterial or fungal growth to date, exudative in nature. Cytology unfortunately came back positive for likely adenocarcinoma, suspect lung primary. She was treated with levaquin and prednisone for likely post-obstructive pneumonia given her wheezing. PET CT demonstrated hypermetabolic areas upper lobe of right lung, right hilar and mediastinal lymph nodes, and scattered osseous mets (axial, appendicular, pelvis, spine). Pleurex catheter was placed 6/29 for her recurrent effusion by interventional pulmonary, with small right apical pneumothorax (non-hypoxic), that was treated with oxygen. Oncology was consulted and kindly assisted with management. A care conference was held with Estela and her son, Sachin, who both expressed wishes for no further biopsy, no aggressive treatments at this time, and plan to transition to hospice after rehabilitation and Sachin's wedding. Code status was also discussed with patient and they expressed Full Code status currently, and will revisit this as an outpatient after family has had time to come to terms with these news. Social work reached out to TCU who will assist patient with enrollment into Hospice. Per Registered Nurse on TCU- output to pleurex catheter has been <20mL this week despite reduction of pleurex  catheter drain cares.   Plan:   -Continue to monitor pain   -Monitor for worsening s/sx of concerns  -Monitor respiratory status  -Albuterol inhaler Q 6hours scheduled  -Continue Tylenol 1000mg every 6 hour PRN for pain management needs for now.   -Due to limited output, will change drain care to: drain pleurex catheter Once a week (Saturdays)   -Perform pleurex catheter dressing cares weekly after use. Do not remove more than 1000mL each time.      (E21.3) Hyperparathyroidism (H)  (E83.52) Hypercalcemia  (E04.1) Thyroid nodule  Comment: Acute on chronic. Suspect malignancy related vs primary. Family and patient would not like to pursue further treatments. Discussed the possibility of constipation in conjunction with opioids.   Plan:   -Monitor for worsening s/sx of concerns  -Hospice upon discharge     (E87.1) Chronic hyponatremia  Comment: Baseline 130-132 for the last 2 years, stable. Serum osmolality 278, urine osmolality 699, and urine sodium 72, concern for glucocorticoid deficiency. Cosyntropin stim test with appropriate cortisol response. Likely related to malignancy.  Plan:   -Monitor for worsening s/sx of concerns  -Hospice upon discharge     (I10) Hypertension goal BP (blood pressure) < 130/80  (N18.30) Stage 3 chronic kidney disease, unspecified whether stage 3a or 3b CKD  (E78.5) Hyperlipidemia, unspecified hyperlipidemia type  Comment: Acute on chronic. Baseline creatinine~0.84-1.10. Most recent weight 223#. Based on JNC-8 goals,  patients age of 86 year old, presence of diabetes or CKD, and goals of care goal BP is  <140/90 mm Hg. Noted patients BP is lower than goal and will adjust plan of care by.  Plan:   -Monitor BP and HR as directed  -Continue losartan as directed  -Continue amlodipine as directed  -Reduce hydralazine to 25mg daily  -Daily weights as directed. Admit weight# 235  -Discontinue crestor due to goals of care     (Z86.73) History of embolic stroke  Comment: Chronic. Noted in  2015.   Plan:   -Monitor BP and HR  -Continue plavix as directed  -Hospice upon discharge.      Electronically signed by:  Tasneem Silverio DNP, APRN

## 2021-07-26 NOTE — LETTER
"    7/26/2021        RE: Bárbara Vuong  [address] [id number]        [facility] GERIATRIC SERVICES    Chief Complaint   Patient presents with     RECHECK     HPI:  Bárbara Vuong is a 86 year old  (1935), who is being seen today for an episodic care visit at: [facility] AT [facility] ([facility]) [[id number]]. Today's concern is:     The primary encounter diagnosis was Physical deconditioning. Diagnoses of Generalized muscle weakness, Metastatic adenocarcinoma (H), Malignant pleural effusion, Acute respiratory failure with hypoxia (H), Obstructive pneumonia, Hyperparathyroidism (H), Hypercalcemia, Thyroid nodule, Chronic hyponatremia, Hypertension goal BP (blood pressure) < 130/80, Stage 3 chronic kidney disease, unspecified whether stage 3a or 3b CKD, Hyperlipidemia, unspecified hyperlipidemia type, History of embolic stroke, CARDIOVASCULAR SCREENING; LDL GOAL LESS THAN 130, Adenocarcinoma (H), Bone metastasis (H), and History of CVA (cerebrovascular accident) were also pertinent to this visit.    Met with patient who denies any chest pain, palpitations, shortness of breath, GARCIA, lightheadedness, dizziness, or cough. Denies any abdominal discomfort. Denies N&V. Denies B&B concerns. Denies dysuria or frequency. Denies loose or constipation. Appetite good. Sleeping well.      Per care conference on 7/13/21: Progress care conference held today. Present guest, roberto Stephenson, Leilani PT, and NTC. PT-sit-stand EZ stand, noted guest C/O pain in knees with use also \"hangs\" until straight legs, sit-stand bars max A2, transfers mod-max A2, bed mobility max A, no ambulating at this time, tolerating standing up to 45 seconds, working on strengthening. OT-grooming/hygiene sitting min A, UE supervision-SBA, LE max A, toileting max A, started the CPT. Discussed need to simulate or do things that would need to do at home. Therapy to continue 2 weeks. Son reported that stair left will be extended in about two " "weeks. Discussed discharge at this time and guest doesn't want to change the first level into a bedroom/bathroom, is willing to work on improving so that doesn't happen. Discussed home safety evaluation and timing. Progress care conference scheduled 7/27 at 8:30am.    BP Readings from Last 3 Encounters:   07/26/21 122/71   07/19/21 130/69   07/12/21 133/74     Wt Readings from Last 5 Encounters:   07/26/21 101.8 kg (224 lb 8 oz)   07/19/21 102.8 kg (226 lb 9.6 oz)   07/12/21 101.7 kg (224 lb 4.8 oz)   07/08/21 103.8 kg (228 lb 14.4 oz)   07/05/21 106.3 kg (234 lb 4.8 oz)     Allergies, and PMH/PSH reviewed in EPIC today.  REVIEW OF SYSTEMS:  10 point ROS of systems including Constitutional, Eyes, Respiratory, Cardiovascular, Gastroenterology, Genitourinary, Integumentary, Musculoskeletal, Psychiatric were all negative except for pertinent positives noted in my HPI.    Objective:   /71   Pulse 95   Temp 98.3  F (36.8  C)   Resp 17   Ht 1.651 m (5' 5\")   Wt 101.8 kg (224 lb 8 oz)   SpO2 90%   BMI 37.36 kg/m    GENERAL APPEARANCE:  Alert, in no distress, oriented, cooperative  ENT:  Mouth and posterior oropharynx normal, moist mucous membranes, [place]  EYES:  EOM, conjunctivae, lids, pupils and irises normal  NECK:  No adenopathy,masses or thyromegaly  RESP:  respiratory effort and palpation of chest normal, no respiratory distress, diminished breath sounds bibasilar, expiratory wheezes  CV:  Palpation and auscultation of heart done , regular rate and rhythm, no murmur, rub, or gallop, no edema, +2 pedal pulses  ABDOMEN:  normal bowel sounds, soft, nontender, no hepatosplenomegaly or other masses, no guarding or rebound  M/S:   REquires eazy stand for transfers. Wheelchair bound  SKIN:  Inspection of skin and subcutaneous tissue baseline, Palpation of skin and subcutaneous tissue baseline  NEURO:   Cranial nerves 2-12 are normal tested and grossly at patient's baseline, no purposeful movement in upper and " lower extremities  PSYCH:  oriented X 3, memory impaired , affect and mood normal    Patient is on hospice/palliative care and labs are not recommended    ASSESSMENT/PLAN:  (R53.81) Physical deconditioning  (primary encounter diagnosis)  (M62.81) Generalized muscle weakness  Comment: Acute on chronic. S/T below diagnosis  Plan:   -Continue Physical therapy and Occupational therapy  -SW to remain involve for safe discharge planning needs  -Hospice planned to start at discharge     (C79.9) Metastatic adenocarcinoma (H)  (J91.0) Malignant pleural effusion  (J96.01) Acute respiratory failure with hypoxia (H)  (J18.9) Obstructive pneumonia  Comment: She presented with hypoxia and large right pleural effusion. Underwent diagnostic and therapeutic thoracentesis with negative bacterial or fungal growth to date, exudative in nature. Cytology unfortunately came back positive for likely adenocarcinoma, suspect lung primary. She was treated with levaquin and prednisone for likely post-obstructive pneumonia given her wheezing. PET CT demonstrated hypermetabolic areas upper lobe of right lung, right hilar and mediastinal lymph nodes, and scattered osseous mets (axial, appendicular, pelvis, spine). Pleurex catheter was placed 6/29 for her recurrent effusion by interventional pulmonary, with small right apical pneumothorax (non-hypoxic), that was treated with oxygen. Oncology was consulted and kindly assisted with management. A care conference was held with Estela and her son, Sachin, who both expressed wishes for no further biopsy, no aggressive treatments at this time, and plan to transition to hospice after rehabilitation and Sachin's wedding. Code status was also discussed with patient and they expressed Full Code status currently, and will revisit this as an outpatient after family has had time to come to terms with these news. Social work reached out to TCU who will assist patient with enrollment into Hospice. Per Registered  Nurse on TCU- output to pleurex catheter has been <20mL this week despite reduction of pleurex catheter drain cares.   Plan:   -Continue to monitor pain   -Monitor for worsening s/sx of concerns  -Monitor respiratory status  -Albuterol inhaler Q 6hours scheduled  -Continue Tylenol 1000mg every 6 hour PRN for pain management needs for now.   -Due to limited output, will change drain care to: drain pleurex catheter Once a week (Saturdays)   -Perform pleurex catheter dressing cares weekly after use. Do not remove more than 1000mL each time.      (E21.3) Hyperparathyroidism (H)  (E83.52) Hypercalcemia  (E04.1) Thyroid nodule  Comment: Acute on chronic. Suspect malignancy related vs primary. Family and patient would not like to pursue further treatments. Discussed the possibility of constipation in conjunction with opioids.   Plan:   -Monitor for worsening s/sx of concerns  -Hospice upon discharge     (E87.1) Chronic hyponatremia  Comment: Baseline 130-132 for the last 2 years, stable. Serum osmolality 278, urine osmolality 699, and urine sodium 72, concern for glucocorticoid deficiency. Cosyntropin stim test with appropriate cortisol response. Likely related to malignancy.  Plan:   -Monitor for worsening s/sx of concerns  -Hospice upon discharge     (I10) Hypertension goal BP (blood pressure) < 130/80  (N18.30) Stage 3 chronic kidney disease, unspecified whether stage 3a or 3b CKD  (E78.5) Hyperlipidemia, unspecified hyperlipidemia type  Comment: Acute on chronic. Baseline creatinine~0.84-1.10. Most recent weight 223#. Based on JNC-8 goals,  patients age of 86 year old, presence of diabetes or CKD, and goals of care goal BP is  <140/90 mm Hg. Noted patients BP is lower than goal and will adjust plan of care by.  Plan:   -Monitor BP and HR as directed  -Continue losartan as directed  -Continue amlodipine as directed  -Reduce hydralazine to 25mg daily  -Daily weights as directed. Admit weight# 235  -Discontinue crestor  due to goals of care     (Z86.73) History of embolic stroke  Comment: Chronic. Noted in 2015.   Plan:   -Monitor BP and HR  -Continue plavix as directed  -Hospice upon discharge.      Electronically signed by:  Tasneem Silverio DNP, CHRIS        Sincerely,        CHRIS Jackson CNP

## 2021-07-29 NOTE — LETTER
"    7/29/2021        RE: Bárbara Vuong  [address] [facility] [address] [place] [address]        [facility] DISCHARGE SUMMARY  PATIENT'S NAME: Bárbara Vuong  YOB: 1935  MEDICAL RECORD NUMBER:  [id number]  Place of Service where encounter took place:  [facility] [[id number]]    PRIMARY CARE PROVIDER AND CLINIC RESPONSIBLE AFTER TRANSFER:   Janis Louis DO, [address] [facility] [address]    Non-[facility] Provider     Transferring providers: CHRIS Jackson CNP, milena Haley MD  Recent Hospitalization/ED:  [facility] stay 6/19/21 to 7/1/21.  Date of SNF Admission: July 01, 2021  Date of SNF (anticipated) Discharge: August 4-6, 2021  Discharged to: previous independent home and with hospice  Cognitive Scores: BIMS: 14/15 and CPT: 5.1/5.6  Physical Function: PT-working on stand pivot transfers, sit- parallel bars mod-max A, sit-stand to walker max A, standing up to 1 minute, working on strengthening and wheelchair pushups. OT-UE SBA, LE mod A due to need for UE support, socks/shoes max A (dalia does not want to work on socks due to difficulty with), transfers EZ aid, toileting with RTS max A, standing up to 1 minute, CPT 5.1. Stand-pivot transfers are the goal over the next week. Discussed home safety evaluation, dalia is to have chairlift extended \"next Monday\" but still would like to look at lower level for modifications which dalia continues to be against except bars in the bathroom. Home safety eval scheduled 7/30 at 1pm. Discussed chest tube and drainage which has been decreasing significantly. Discussed discharge plan which is still wanting to discharge on hospice, referral made. Dalia lives alone in a two level home, has an adjustable bed/bed rail/wheelchair/fww/grab bars/shower bench/chair/RTS/chair lift/lift chair.   DME: SNF  coordinating DME needs     CODE STATUS/ADVANCE DIRECTIVES " DISCUSSION:  Full code- Will transition to DNR/DNI once hospice signed on  ALLERGIES: Penicillins and Melatonin    DISCHARGE DIAGNOSIS/NURSING FACILITY COURSE:     (R53.81) Physical deconditioning  (primary encounter diagnosis)  (M62.81) Generalized muscle weakness  Comment: Acute on chronic. S/T below diagnosis  Plan:   -Continue Physical therapy and Occupational therapy  -SW to remain involve for safe discharge planning needs  -Hospice planned to start at discharge     (C79.9) Metastatic adenocarcinoma (H)  (J91.0) Malignant pleural effusion  (J96.01) Acute respiratory failure with hypoxia (H)  (J18.9) Obstructive pneumonia  Comment: She presented with hypoxia and large right pleural effusion. Underwent diagnostic and therapeutic thoracentesis with negative bacterial or fungal growth to date, exudative in nature. Cytology unfortunately came back positive for likely adenocarcinoma, suspect lung primary. She was treated with levaquin and prednisone for likely post-obstructive pneumonia given her wheezing. PET CT demonstrated hypermetabolic areas upper lobe of right lung, right hilar and mediastinal lymph nodes, and scattered osseous mets (axial, appendicular, pelvis, spine). Pleurex catheter was placed 6/29 for her recurrent effusion by interventional pulmonary, with small right apical pneumothorax (non-hypoxic), that was treated with oxygen. Oncology was consulted and kindly assisted with management. A care conference was held with Estela and her son, Sachin, who both expressed wishes for no further biopsy, no aggressive treatments at this time, and plan to transition to hospice after rehabilitation and Sachin's wedding. Code status was also discussed with patient and they expressed Full Code status currently, and will revisit this as an outpatient after family has had time to come to terms with these news. Social work reached out to TCU who will assist patient with enrollment into Hospice. Per Registered Nurse on  TCU- output to pleurex catheter has been <20mL this week despite reduction of pleurex catheter drain cares. Staff report that patient oxygen levels are slightly low to 86% on RA first thing in AM, however there is no reports of shortness of breath. Does have some minimal cough concerns.   Plan:   -Continue to monitor pain   -Monitor for worsening s/sx of concerns  -Monitor respiratory status  -Will start robitussin BID and QID PRN  -Will start oxygen 1-4L via NC to keep sats >90% and/or PRN for comfort.  -Albuterol inhaler Q 6hours scheduled  -Continue Tylenol 1000mg every 6 hour PRN for pain management needs for now.   -Due to limited output, will change drain care to: drain pleurex catheter Once a week (Saturdays)   -Detailed written orders and patient insurance information for drainage kits have been completed and faxed to [employer], ATTN: Nette Johnson.  [employer] Customer Service can be reached at [phone]. Staff to contact her for additional supplies when needed. She was discharged with 4 bottles.   -Perform pleurex catheter dressing cares weekly after use. Do not remove more than 1000mL each time. if no drainage for 3 consecutive days then please contact Tonie Knott at [phone]. Per pulmonology continue this for 3 months     (E21.3) Hyperparathyroidism (H)  (E83.52) Hypercalcemia  (E04.1) Thyroid nodule  Comment: Acute on chronic. Suspect malignancy related vs primary. Family and patient would not like to pursue further treatments. Discussed the possibility of constipation in conjunction with opioids.   Plan:   -Monitor for worsening s/sx of concerns  -Hospice upon discharge     (E87.1) Chronic hyponatremia  Comment: Baseline 130-132 for the last 2 years, stable. Serum osmolality 278, urine osmolality 699, and urine sodium 72, concern for glucocorticoid deficiency. Cosyntropin stim test with appropriate cortisol response. Likely related to malignancy.  Plan:   -Monitor  for worsening s/sx of concerns  -Hospice upon discharge     (I10) Hypertension goal BP (blood pressure) < 130/80  (N18.30) Stage 3 chronic kidney disease, unspecified whether stage 3a or 3b CKD  (E78.5) Hyperlipidemia, unspecified hyperlipidemia type  Comment: Acute on chronic. Baseline creatinine~0.84-1.10. Most recent weight 223#. Based on JNC-8 goals,  patients age of 86 year old, presence of diabetes or CKD, and goals of care goal BP is  <140/90 mm Hg. Noted patients BP is lower than goal and will adjust plan of care by.  Plan:   -Monitor BP and HR as directed  -Continue losartan as directed  -Reduce amlodipine to 5mg daily  -Reduced hydralazine to 25mg daily  -Daily weights as directed. 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 Noted in 2015.   Plan:   -Monitor BP and HR  -Continue plavix as directed  -Hospice upon discharge.     Past Medical History:  has a past medical history of CARDIOVASCULAR SCREENING; LDL GOAL LESS THAN 130 (5/9/2010), CVA (cerebral infarction), Generalized osteoarthrosis, unspecified site, History of blood transfusion, Hypertension goal BP (blood pressure) < 130/80 (2/17/2011), 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    Discharge Medications:    Current Outpatient Medications   Medication Sig Dispense Refill     acetaminophen (TYLENOL) 500 MG tablet Take 2 tablets (1,000 mg) by mouth every 6 hours as needed for mild pain 240 tablet 0     albuterol (PROAIR HFA/PROVENTIL HFA/VENTOLIN HFA) 108 (90 Base) MCG/ACT inhaler Inhale 2 puffs into the lungs every 6 hours 8 g 3     amLODIPine (NORVASC) 10 MG tablet Take 1 tablet (10 mg) by mouth daily 90 tablet 1     Camphor-Menthol-Methyl Sal (SALONPAS) 3.1-6-10 % PTCH Externally apply 1 patch topically daily as needed       clopidogrel (PLAVIX) 75 MG tablet TAKE 1 TABLET BY MOUTH ONCE DAILY 90 tablet 1     guaiFENesin (ROBITUSSIN)  "100 MG/5ML liquid Take 10 mLs (200 mg) by mouth 2 times daily AND QID PRN 1000 mL 0     hydrALAZINE (APRESOLINE) 25 MG tablet Take 1 tablet (25 mg) by mouth daily 60 tablet 0     losartan (COZAAR) 100 MG tablet Take 1 tablet (100 mg) by mouth daily 90 tablet 1     polyethylene glycol (MIRALAX) 17 g packet Take 17 g by mouth daily as needed for constipation 30 packet 3     Medication Changes/Rationale:     See above    Controlled medications sent with patient:   not applicable/none     ROS:   10 point ROS of systems including Constitutional, Eyes, Respiratory, Cardiovascular, Gastroenterology, Genitourinary, Integumentary, Musculoskeletal, Psychiatric were all negative except for pertinent positives noted in my HPI.    Physical Exam:   Vitals: /72   Pulse 97   Temp 98.2  F (36.8  C)   Resp 18   Ht 1.651 m (5' 5\")   Wt 102.4 kg (225 lb 11.2 oz)   SpO2 91%   BMI 37.56 kg/m    BMI= Body mass index is 37.56 kg/m .  GENERAL APPEARANCE:  Alert, in no distress, oriented, cooperative  ENT:  Mouth and posterior oropharynx normal, moist mucous membranes, [place]  EYES:  EOM, conjunctivae, lids, pupils and irises normal  NECK:  No adenopathy,masses or thyromegaly  RESP:  respiratory effort and palpation of chest normal, no respiratory distress, diminished breath sounds biasilar  CV:  Palpation and auscultation of heart done , regular rate and rhythm, no murmur, rub, or gallop, no edema, +2 pedal pulses  ABDOMEN:  normal bowel sounds, soft, nontender, no hepatosplenomegaly or other masses, no guarding or rebound  M/S:   Easy stand transfers. Wheelchair bound  SKIN:  Inspection of skin and subcutaneous tissue baseline, Palpation of skin and subcutaneous tissue baseline  NEURO:   Cranial nerves 2-12 are normal tested and grossly at patient's baseline, no purposeful movement in upper and lower extremities  PSYCH:  oriented X 3, normal insight, judgement and memory, affect and mood normal     SNF labs: Patient is on " hospice/palliative care and labs are not recommended    DISCHARGE PLAN:    Follow up labs: No labs orders/due    Medical Follow Up:      Follow up with primary care provider in 1-2 weeks    MTM referral needed and placed by this provider: No    Current [place] scheduled appointments:  Next 5 appointments (look out 90 days)    Sep 13, 2021  3:00 PM  Anthony Carrillo with Janis Louis DO  [facility] ([facility] [address] [facility] [address] [phone]          Discharge Services: Home Care:  Hospice,  and From:  Hospice agency per request. Unable to start with  hospice due to caseload.     Discharge Instructions Verbalized to Patient at Discharge:     Continue to follow your diet:  regular.     24-hour supervision is recommended for safety.     Due to limited output, will change drain care to: drain pleurex catheter Once a week (Saturdays) Detailed written orders and patient insurance information for drainage kits have been completed and faxed to [employer], ATTN: Nette Johnson.  [employer] Customer Service can be reached at [phone]. Staff to contact her for additional supplies when needed. She was discharged with 4 bottles.     Perform pleurex catheter dressing cares weekly after use. Do not remove more than 1000mL each time. if no drainage for 3 consecutive days then please contact Tonie Knott at [phone]. Per pulmonology continue this for 3 months      TOTAL DISCHARGE TIME:   Greater than 30 minutes  Electronically signed by:  Tasneem Silverio DNP, APRN      Documentation of Face to Face and Certification for Home Health Services    I certify that patient: áBrbara Vuong is under my care and that I, or a nurse practitioner or physician's assistant working with me, had a face-to-face encounter that meets the physician face-to-face encounter requirements with this patient on:  2021.    This encounter with the patient was in whole, or in part, for the following medical condition, which is the primary reason for home health care: Deconditioning S/T above diagnosis/ Lung cancer.    I certify that, based on my findings, the following services are medically necessary home health services: Social Work and Hospice.    My clinical findings support the need for the above services because: Nurse is needed: Hospice care.    Further, I certify that my clinical findings support that this patient is homebound (i.e. absences from home require considerable and taxing effort and are for medical reasons or [employer] services or infrequently or of short duration when for other reasons) because: Requires assistance of another person or specialized equipment to access medical services because patient: Requires supervision of another for safe transfer. and  does not ambulate..    Based on the above findings. I certify that this patient is confined to the home and needs intermittent skilled nursing care, physical therapy and/or speech therapy.  The patient is under my care, and I have initiated the establishment of the plan of care.  This patient will be followed by a physician who will periodically review the plan of care.  Physician/Provider to provide follow up care: Janis Louis    Attending [facility] physician (the Medicare certified [facility] provider): Dr.Yasser Tera MD, signing F2F and only signing for initial order. Please send all follow up questions and concerns or needed follow up signatures to the PCP, who [facility] has on file as:  Janis Louis.    Physician Signature: See electronic signature associated with these discharge orders.  Date: 2021            Face to Face and Medical Necessity Statement for DME Provider visit    Demographic Information on Bárbara Vuong:  Gender: female  : 1935  [address] [facility] [address] [phone] (home) [phone]  (work)    Medical Record: [id number]  Social Security Number: [id number]  Primary Care Provider: Janis Louis  Insurance: Payor: [facility] / Plan: [facility] MEDICARE NON [place] PARTNERS / Product Type: HMO /     HPI:   Bárbara Vuong is a 86 year old  (1935), who is being seen today for a face to face provider visit at [facility]; medical necessity statement for DME included. This patient requires the following:  Guest lives alone in a two level home, has an adjustable bed/bed rail/wheelchair/fww/grab bars/shower bench/chair/RTS/chair lift/lift chair.     DME Ordered and Medical Necessity Statement   Detailed written orders and patient insurance information for drainage kits have been completed and faxed to [employer], ATTN: Nette Johnson.  [employer] Customer Service can be reached at [phone]. Staff to contact her for additional supplies when needed. She was discharged with 4 bottles.     Oxygen: 1-4 L/min via NC neither continuous PRN nor needs portable tank due to mobility in home environment ; Clinical Documentation: (Obtain documented RA sat with in 2 days of discharge in acute setting or within 30 days of provider visit):  Method 1: Room air at rest: Qualifing sat level is < 88% or below on room air at rest on 7/28/21 date        Pt needing above DME with expected length of need of 99 year  due to medical necessity associated with following diagnosis:     Physical deconditioning  Generalized muscle weakness  Malignant pleural effusion  Metastatic adenocarcinoma (H)  Acute respiratory failure with hypoxia (H)  Obstructive pneumonia  Hyperparathyroidism (H)  Hypercalcemia  Chronic hyponatremia  Thyroid nodule  Hypertension goal BP (blood pressure) < 130/80  Hyperlipidemia, unspecified hyperlipidemia type  Stage 3 chronic kidney disease, unspecified whether stage 3a or 3b CKD  History of embolic stroke  CARDIOVASCULAR SCREENING; LDL GOAL LESS THAN  "130  Adenocarcinoma (H)  Bone metastasis (H)  History of CVA (cerebrovascular accident)      PMH   has a past medical history of CARDIOVASCULAR SCREENING; LDL GOAL LESS THAN 130 (5/9/2010), CVA (cerebral infarction), Generalized osteoarthrosis, unspecified site, History of blood transfusion, Hypertension goal BP (blood pressure) < 130/80 (2/17/2011), 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    ROS:10 point ROS of systems including Constitutional, Eyes, Respiratory, Cardiovascular, Gastroenterology, Genitourinary, Integumentary, Musculoskeletal, Psychiatric were all negative except for pertinent positives noted in my HPI.    EXAM  Vitals: /72   Pulse 97   Temp 98.2  F (36.8  C)   Resp 18   Ht 1.651 m (5' 5\")   Wt 102.4 kg (225 lb 11.2 oz)   SpO2 91%   BMI 37.56 kg/m  ;BMI= Body mass index is 37.56 kg/m .  GENERAL APPEARANCE:  Alert, in no distress, oriented, cooperative  ENT:  Mouth and posterior oropharynx normal, moist mucous membranes, [place]  EYES:  EOM, conjunctivae, lids, pupils and irises normal  NECK:  No adenopathy,masses or thyromegaly  RESP:  respiratory effort and palpation of chest normal, no respiratory distress, diminished breath sounds biasilar  CV:  Palpation and auscultation of heart done , regular rate and rhythm, no murmur, rub, or gallop, no edema, +2 pedal pulses  ABDOMEN:  normal bowel sounds, soft, nontender, no hepatosplenomegaly or other masses, no guarding or rebound  M/S:   Easy stand transfers. Wheelchair bound  SKIN:  Inspection of skin and subcutaneous tissue baseline, Palpation of skin and subcutaneous tissue baseline  NEURO:   Cranial nerves 2-12 are normal tested and grossly at patient's baseline, no purposeful movement in upper and lower extremities  PSYCH:  oriented X 3, normal insight, judgement and memory, affect and mood normal      ASSESSMENT/PLAN:  1. Physical deconditioning    2. Generalized muscle weakness    3. Malignant " pleural effusion    4. Metastatic adenocarcinoma (H)    5. Acute respiratory failure with hypoxia (H)    6. Obstructive pneumonia    7. Hyperparathyroidism (H)    8. Hypercalcemia    9. Chronic hyponatremia    10. Thyroid nodule    11. Hypertension goal BP (blood pressure) < 130/80    12. Hyperlipidemia, unspecified hyperlipidemia type    13. Stage 3 chronic kidney disease, unspecified whether stage 3a or 3b CKD    14. History of embolic stroke    15. CARDIOVASCULAR SCREENING; LDL GOAL LESS THAN 130    16. Adenocarcinoma (H)    17. Bone metastasis (H)    18. History of CVA (cerebrovascular accident)        Orders:  1. Facility staff/TC to contact DME company to get their order form for provider to fill out    ELECTRONICALLY SIGNED BY [place] CERTIFIED PROVIDER:  CHRIS Jackson CNP   NPI: [id number]  [facility]  [address] [place] [address], [address]  [place] [address]                          Sincerely,        CHRIS Jackson CNP

## 2021-07-29 NOTE — PROGRESS NOTES
"[place] [facility] DISCHARGE SUMMARY  PATIENT'S NAME: Bárbara Vuong  YOB: 1935  MEDICAL RECORD NUMBER:  [id number]  Place of Service where encounter took place:  [facility]  AT [facility] ([facility]) [[id number]]    PRIMARY CARE PROVIDER AND CLINIC RESPONSIBLE AFTER TRANSFER:   Janis Louis DO, [address] [facility] [address]    Non-FMG Provider     Transferring providers: CHRIS Jackson CNP, milena Haley MD  Recent Hospitalization/ED:  [facility] stay 6/19/21 to 7/1/21.  Date of SNF Admission: July 01, 2021  Date of SNF (anticipated) Discharge: August 4-6, 2021  Discharged to: previous independent home and with hospice  Cognitive Scores: BIMS: 14/15 and CPT: 5.1/5.6  Physical Function: PT-working on stand pivot transfers, sit- parallel bars mod-max A, sit-stand to walker max A, standing up to 1 minute, working on strengthening and wheelchair pushups. OT-UE SBA, LE mod A due to need for UE support, socks/shoes max A (guest does not want to work on socks due to difficulty with), transfers EZ aid, toileting with RTS max A, standing up to 1 minute, CPT 5.1. Stand-pivot transfers are the goal over the next week. Discussed home safety evaluation, dalia is to have chairlift extended \"next Monday\" but still would like to look at lower level for modifications which dalia continues to be against except bars in the bathroom. Home safety eval scheduled 7/30 at 1pm. Discussed chest tube and drainage which has been decreasing significantly. Discussed discharge plan which is still wanting to discharge on hospice, referral made. Dalia lives alone in a two level home, has an adjustable bed/bed rail/wheelchair/fww/grab bars/shower bench/chair/RTS/chair lift/lift chair.   DME: SNF  coordinating DME needs     CODE STATUS/ADVANCE DIRECTIVES DISCUSSION:  Full code- Will transition to DNR/DNI once hospice signed on  ALLERGIES: Penicillins and " Melatonin    DISCHARGE DIAGNOSIS/NURSING FACILITY COURSE:     (R53.81) Physical deconditioning  (primary encounter diagnosis)  (M62.81) Generalized muscle weakness  Comment: Acute on chronic. S/T below diagnosis  Plan:   -Continue Physical therapy and Occupational therapy  -SW to remain involve for safe discharge planning needs  -Hospice planned to start at discharge     (C79.9) Metastatic adenocarcinoma (H)  (J91.0) Malignant pleural effusion  (J96.01) Acute respiratory failure with hypoxia (H)  (J18.9) Obstructive pneumonia  Comment: She presented with hypoxia and large right pleural effusion. Underwent diagnostic and therapeutic thoracentesis with negative bacterial or fungal growth to date, exudative in nature. Cytology unfortunately came back positive for likely adenocarcinoma, suspect lung primary. She was treated with levaquin and prednisone for likely post-obstructive pneumonia given her wheezing. PET CT demonstrated hypermetabolic areas upper lobe of right lung, right hilar and mediastinal lymph nodes, and scattered osseous mets (axial, appendicular, pelvis, spine). Pleurex catheter was placed 6/29 for her recurrent effusion by interventional pulmonary, with small right apical pneumothorax (non-hypoxic), that was treated with oxygen. Oncology was consulted and kindly assisted with management. A care conference was held with Estela and her son, Sachin, who both expressed wishes for no further biopsy, no aggressive treatments at this time, and plan to transition to hospice after rehabilitation and Sachin's wedding. Code status was also discussed with patient and they expressed Full Code status currently, and will revisit this as an outpatient after family has had time to come to terms with these news. Social work reached out to TCU who will assist patient with enrollment into Hospice. Per Registered Nurse on TCU- output to pleurex catheter has been <20mL this week despite reduction of pleurex catheter drain  cares. Staff report that patient oxygen levels are slightly low to 86% on RA first thing in AM, however there is no reports of shortness of breath. Does have some minimal cough concerns.   Plan:   -Continue to monitor pain   -Monitor for worsening s/sx of concerns  -Monitor respiratory status  -Will start robitussin BID and QID PRN  -Will start oxygen 1-4L via NC to keep sats >90% and/or PRN for comfort.  -Albuterol inhaler Q 6hours scheduled  -Continue Tylenol 1000mg every 6 hour PRN for pain management needs for now.   -Due to limited output,  pleurex catheter was being changed to weekly, however due to increased shortness of breath and desaturations, will change back to twice weekly draining as directed on Tuesdays and Friday.    -Detailed written orders and patient insurance information for drainage kits have been completed and faxed to [email or web address], ATTN: Nette Johnson.  [employer] Customer Service can be reached at [phone]. Staff to contact her for additional supplies when needed. She was discharged with 4 bottles.   -Perform pleurex catheter dressing cares twice weekly after use. Do not remove more than 1000mL each time. if no drainage for 3 consecutive days then please contact Tonie Knott at [phone]. Per pulmonology continue this for 3 months     (E21.3) Hyperparathyroidism (H)  (E83.52) Hypercalcemia  (E04.1) Thyroid nodule  Comment: Acute on chronic. Suspect malignancy related vs primary. Family and patient would not like to pursue further treatments. Discussed the possibility of constipation in conjunction with opioids.   Plan:   -Monitor for worsening s/sx of concerns  -Hospice upon discharge     (E87.1) Chronic hyponatremia  Comment: Baseline 130-132 for the last 2 years, stable. Serum osmolality 278, urine osmolality 699, and urine sodium 72, concern for glucocorticoid deficiency. Cosyntropin stim test with appropriate cortisol response. Likely related to  malignancy.  Plan:   -Monitor for worsening s/sx of concerns  -Hospice upon discharge     (I10) Hypertension goal BP (blood pressure) < 130/80  (N18.30) Stage 3 chronic kidney disease, unspecified whether stage 3a or 3b CKD  (E78.5) Hyperlipidemia, unspecified hyperlipidemia type  Comment: Acute on chronic. Baseline creatinine~0.84-1.10. Most recent weight 223#. Based on JNC-8 goals,  patients age of 86 year old, presence of diabetes or CKD, and goals of care goal BP is  <140/90 mm Hg. Noted patients BP is lower than goal and will adjust plan of care by.  Plan:   -Monitor BP and HR as directed  -Continue losartan as directed  -Reduce amlodipine to 5mg daily  -Reduced hydralazine to 25mg daily  -Daily weights as directed. 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 Noted in 2015.   Plan:   -Monitor BP and HR  -Continue plavix as directed  -Hospice upon discharge.     Past Medical History:  has a past medical history of CARDIOVASCULAR SCREENING; LDL GOAL LESS THAN 130 (5/9/2010), CVA (cerebral infarction), Generalized osteoarthrosis, unspecified site, History of blood transfusion, Hypertension goal BP (blood pressure) < 130/80 (2/17/2011), 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    Discharge Medications:    Current Outpatient Medications   Medication Sig Dispense Refill     acetaminophen (TYLENOL) 500 MG tablet Take 2 tablets (1,000 mg) by mouth every 6 hours as needed for mild pain 240 tablet 0     albuterol (PROAIR HFA/PROVENTIL HFA/VENTOLIN HFA) 108 (90 Base) MCG/ACT inhaler Inhale 2 puffs into the lungs every 6 hours 8 g 3     amLODIPine (NORVASC) 10 MG tablet Take 1 tablet (10 mg) by mouth daily 90 tablet 1     Camphor-Menthol-Methyl Sal (SALONPAS) 3.1-6-10 % PTCH Externally apply 1 patch topically daily as needed       clopidogrel (PLAVIX) 75 MG tablet TAKE 1 TABLET BY MOUTH ONCE DAILY 90 tablet 1  "    guaiFENesin (ROBITUSSIN) 100 MG/5ML liquid Take 10 mLs (200 mg) by mouth 2 times daily AND QID PRN 1000 mL 0     hydrALAZINE (APRESOLINE) 25 MG tablet Take 1 tablet (25 mg) by mouth daily 60 tablet 0     losartan (COZAAR) 100 MG tablet Take 1 tablet (100 mg) by mouth daily 90 tablet 1     polyethylene glycol (MIRALAX) 17 g packet Take 17 g by mouth daily as needed for constipation 30 packet 3     Medication Changes/Rationale:     See above    Controlled medications sent with patient:   not applicable/none     ROS:   10 point ROS of systems including Constitutional, Eyes, Respiratory, Cardiovascular, Gastroenterology, Genitourinary, Integumentary, Musculoskeletal, Psychiatric were all negative except for pertinent positives noted in my HPI.    Physical Exam:   Vitals: /72   Pulse 97   Temp 98.2  F (36.8  C)   Resp 18   Ht 1.651 m (5' 5\")   Wt 102.4 kg (225 lb 11.2 oz)   SpO2 91%   BMI 37.56 kg/m    BMI= Body mass index is 37.56 kg/m .  GENERAL APPEARANCE:  Alert, in no distress, oriented, cooperative  ENT:  Mouth and posterior oropharynx normal, moist mucous membranes, [place]  EYES:  EOM, conjunctivae, lids, pupils and irises normal  NECK:  No adenopathy,masses or thyromegaly  RESP:  respiratory effort and palpation of chest normal, no respiratory distress, diminished breath sounds biasilar  CV:  Palpation and auscultation of heart done , regular rate and rhythm, no murmur, rub, or gallop, no edema, +2 pedal pulses  ABDOMEN:  normal bowel sounds, soft, nontender, no hepatosplenomegaly or other masses, no guarding or rebound  M/S:   Easy stand transfers. Wheelchair bound  SKIN:  Inspection of skin and subcutaneous tissue baseline, Palpation of skin and subcutaneous tissue baseline  NEURO:   Cranial nerves 2-12 are normal tested and grossly at patient's baseline, no purposeful movement in upper and lower extremities  PSYCH:  oriented X 3, normal insight, judgement and memory, affect and mood normal "     SNF labs: Patient is on hospice/palliative care and labs are not recommended    DISCHARGE PLAN:    Follow up labs: No labs orders/due    Medical Follow Up:      Follow up with primary care provider in 1-2 weeks    MTM referral needed and placed by this provider: No    Current [place] scheduled appointments:  Next 5 appointments (look out 90 days)    Sep 13, 2021  3:00 PM  Anthony Carrillo with Janis Louis DO  [facility] [address] [facility] [address] [phone]          Discharge Services: Home Care:  Hospice,  and From: [facility]. Unable to start with  hospice due to caseload.     Discharge Instructions Verbalized to Patient at Discharge:     Continue to follow your diet:  regular.     24-hour supervision is recommended for safety.     Due to limited output, will change drain care to: drain pleurex catheter Once a week (Saturdays) Detailed written orders and patient insurance information for drainage kits have been completed and faxed to [employer], ATTN: Nette Johnson.  [employer] Customer Service can be reached at [phone]. Staff to contact her for additional supplies when needed. She was discharged with 4 bottles.     Perform pleurex catheter dressing cares weekly after use. Do not remove more than 1000mL each time. if no drainage for 3 consecutive days then please contact Tonie Knott at [phone]. Per pulmonology continue this for 3 months      TOTAL DISCHARGE TIME:   Greater than 30 minutes  Electronically signed by:  Tasneem Silverio DNP, APRN      Documentation of Face to Face and Certification for Home Health Services    I certify that patient: Bárbara Vuong is under my care and that I, or a nurse practitioner or physician's assistant working with me, had a face-to-face encounter that meets the physician face-to-face encounter requirements with this patient  on: 2021.    This encounter with the patient was in whole, or in part, for the following medical condition, which is the primary reason for home health care: Deconditioning S/T above diagnosis/ Lung cancer.    I certify that, based on my findings, the following services are medically necessary home health services: Social Work and Hospice.    My clinical findings support the need for the above services because: Nurse is needed: Hospice care.    Further, I certify that my clinical findings support that this patient is homebound (i.e. absences from home require considerable and taxing effort and are for medical reasons or [employer] services or infrequently or of short duration when for other reasons) because: Requires assistance of another person or specialized equipment to access medical services because patient: Requires supervision of another for safe transfer. and  does not ambulate..    Based on the above findings. I certify that this patient is confined to the home and needs intermittent skilled nursing care, physical therapy and/or speech therapy.  The patient is under my care, and I have initiated the establishment of the plan of care.  This patient will be followed by a physician who will periodically review the plan of care.  Physician/Provider to provide follow up care: Janis Louis    Attending hospital physician (the Medicare certified [facility] provider): Dr.Yasser Tera MD, signing F2F and only signing for initial order. Please send all follow up questions and concerns or needed follow up signatures to the PCP, who [facility] has on file as:  Janis Louis.    Physician Signature: See electronic signature associated with these discharge orders.  Date: 2021            Face to Face and Medical Necessity Statement for DME Provider visit    Demographic Information on Bárbara Vuong:  Gender: female  : 1935  [address] [place] [id number] [phone] (home) [phone]  (work)    Medical Record: [id number]  Social Security Number: [id number]  Primary Care Provider: Janis Louis  Insurance: Payor: [facility] / Plan: [facility] MEDICARE NON [place] PARTNERS / Product Type: HMO /     HPI:   Bárbara Vuong is a 86 year old  (1935), who is being seen today for a face to face provider visit at [facility]; medical necessity statement for DME included. This patient requires the following:  Guest lives alone in a two level home, has an adjustable bed/bed rail/wheelchair/fww/grab bars/shower bench/chair/RTS/chair lift/lift chair.     DME Ordered and Medical Necessity Statement   Detailed written orders and patient insurance information for drainage kits have been completed and faxed to [employer], ATTN: Nette Johnson.  [employer] Customer Service can be reached at [phone]. Staff to contact her for additional supplies when needed. She was discharged with 4 bottles.     Oxygen: 1-4 L/min via NC neither continuous PRN nor needs portable tank due to mobility in home environment ; Clinical Documentation: (Obtain documented RA sat with in 2 days of discharge in acute setting or within 30 days of provider visit):  Method 1: Room air at rest: Qualifing sat level is < 88% or below on room air at rest on 7/28/21 date        Pt needing above DME with expected length of need of 99 year  due to medical necessity associated with following diagnosis:     Physical deconditioning  Generalized muscle weakness  Malignant pleural effusion  Metastatic adenocarcinoma (H)  Acute respiratory failure with hypoxia (H)  Obstructive pneumonia  Hyperparathyroidism (H)  Hypercalcemia  Chronic hyponatremia  Thyroid nodule  Hypertension goal BP (blood pressure) < 130/80  Hyperlipidemia, unspecified hyperlipidemia type  Stage 3 chronic kidney disease, unspecified whether stage 3a or 3b CKD  History of embolic stroke  CARDIOVASCULAR SCREENING; LDL GOAL LESS THAN  "130  Adenocarcinoma (H)  Bone metastasis (H)  History of CVA (cerebrovascular accident)      PMH   has a past medical history of CARDIOVASCULAR SCREENING; LDL GOAL LESS THAN 130 (5/9/2010), CVA (cerebral infarction), Generalized osteoarthrosis, unspecified site, History of blood transfusion, Hypertension goal BP (blood pressure) < 130/80 (2/17/2011), 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    ROS:10 point ROS of systems including Constitutional, Eyes, Respiratory, Cardiovascular, Gastroenterology, Genitourinary, Integumentary, Musculoskeletal, Psychiatric were all negative except for pertinent positives noted in my HPI.    EXAM  Vitals: /72   Pulse 97   Temp 98.2  F (36.8  C)   Resp 18   Ht 1.651 m (5' 5\")   Wt 102.4 kg (225 lb 11.2 oz)   SpO2 91%   BMI 37.56 kg/m  ;BMI= Body mass index is 37.56 kg/m .  GENERAL APPEARANCE:  Alert, in no distress, oriented, cooperative  ENT:  Mouth and posterior oropharynx normal, moist mucous membranes, [place]  EYES:  EOM, conjunctivae, lids, pupils and irises normal  NECK:  No adenopathy,masses or thyromegaly  RESP:  respiratory effort and palpation of chest normal, no respiratory distress, diminished breath sounds biasilar  CV:  Palpation and auscultation of heart done , regular rate and rhythm, no murmur, rub, or gallop, no edema, +2 pedal pulses  ABDOMEN:  normal bowel sounds, soft, nontender, no hepatosplenomegaly or other masses, no guarding or rebound  M/S:   Easy stand transfers. Wheelchair bound  SKIN:  Inspection of skin and subcutaneous tissue baseline, Palpation of skin and subcutaneous tissue baseline  NEURO:   Cranial nerves 2-12 are normal tested and grossly at patient's baseline, no purposeful movement in upper and lower extremities  PSYCH:  oriented X 3, normal insight, judgement and memory, affect and mood normal      ASSESSMENT/PLAN:  1. Physical deconditioning    2. Generalized muscle weakness    3. Malignant " pleural effusion    4. Metastatic adenocarcinoma (H)    5. Acute respiratory failure with hypoxia (H)    6. Obstructive pneumonia    7. Hyperparathyroidism (H)    8. Hypercalcemia    9. Chronic hyponatremia    10. Thyroid nodule    11. Hypertension goal BP (blood pressure) < 130/80    12. Hyperlipidemia, unspecified hyperlipidemia type    13. Stage 3 chronic kidney disease, unspecified whether stage 3a or 3b CKD    14. History of embolic stroke    15. CARDIOVASCULAR SCREENING; LDL GOAL LESS THAN 130    16. Adenocarcinoma (H)    17. Bone metastasis (H)    18. History of CVA (cerebrovascular accident)        Orders:  1. Facility staff/TC to contact DME company to get their order form for provider to fill out    ELECTRONICALLY SIGNED BY [employer] CERTIFIED PROVIDER:  CHRIS Jackson CNP   NPI: [id number]  [place] [facility]  [address] [place] [address], [address]  [place] [address]

## 2021-08-04 NOTE — PROGRESS NOTES
[facility] GERIATRIC SERVICES    Chief Complaint   Patient presents with     RECHECK     HPI:  Bárbara Vuong is a 86 year old  (1935), who is being seen today for an episodic care visit at: [facility] ([facility]) [[id number]]. Today's concern is:   The primary encounter diagnosis was Physical deconditioning. Diagnoses of Generalized muscle weakness, Malignant pleural effusion, Metastatic adenocarcinoma (H), Acute respiratory failure with hypoxia (H), Obstructive pneumonia, Hyperparathyroidism (H), Hypercalcemia, Chronic hyponatremia, Thyroid nodule, Hypertension goal BP (blood pressure) < 130/80, Hyperlipidemia, unspecified hyperlipidemia type, Stage 3 chronic kidney disease, unspecified whether stage 3a or 3b CKD, History of embolic stroke, Adenocarcinoma (H), CARDIOVASCULAR SCREENING; LDL GOAL LESS THAN 130, Bone metastasis (H), and History of CVA (cerebrovascular accident) were also pertinent to this visit.    Per care conference on 8/3/21: Progress care conference held today. Present guest, son Alexsandra Stephenson from [facility], [facility], and [facility]. PT-sit-stand max A in parallel bars, sit-stand mod-max A at grab bars in bathroom, only tolerating standing 50 seconds, unable to  feet to do a pivot transfers, recommend EZ stand with staff. OT-UE SBA, LE mod-max A, TEDS total A, working with EZ aid for transfers, staff to use EZ stand at this time, noted oxygen drops with activity. Noted guest has not improved much over the past week. LDOS 8/10, discussed discharge plan again, guest continues to desire to go home but per son EZ stand is not very conducive in the home due to tight corners and hallways. Son does not feel guest would be able to use upstairs so guest would need to convert dining room into a bed room which guest is reluctant to do. Writer to continue looking at [facility] in case guest cannot go home. The plan is to have 24 hour assist and son has been working with [facility] who  "confirm the 24 hour plan. Discharge care conference scheduled 8/9 at 1pm.    Met with patient who denies any chest pain, palpitations, shortness of breath, GARCIA, lightheadedness, dizziness, or cough. However she remains on oxygen needs and tends to breath better with oxygen on currently. Denies any abdominal discomfort. Denies N&V. Denies B&B concerns. Denies dysuria or frequency. Denies loose or constipation. Appetite fair. Sleeping well.  She reports pain to right shoulder. Nursing reports she also has been complaining of back pain as well, however she denies this morning. BPs have suzette elevated at times which I suspect is due to pain complaints. They improve after AM routine.     BP Readings from Last 3 Encounters:   08/04/21 106/67   07/28/21 113/72   07/26/21 122/71     Wt Readings from Last 5 Encounters:   08/04/21 99.3 kg (218 lb 14.4 oz)   07/28/21 102.4 kg (225 lb 11.2 oz)   07/26/21 101.8 kg (224 lb 8 oz)   07/19/21 102.8 kg (226 lb 9.6 oz)   07/12/21 101.7 kg (224 lb 4.8 oz)     Allergies, and PMH/PSH reviewed in EPIC today.  REVIEW OF SYSTEMS:  10 point ROS of systems including Constitutional, Eyes, Respiratory, Cardiovascular, Gastroenterology, Genitourinary, Integumentary, Musculoskeletal, Psychiatric were all negative except for pertinent positives noted in my HPI.    Objective:   /67   Pulse 84   Temp 96.8  F (36  C)   Resp 17   Ht 1.651 m (5' 5\")   Wt 99.3 kg (218 lb 14.4 oz)   SpO2 95%   BMI 36.43 kg/m    GENERAL APPEARANCE:  Alert, in no distress, cooperative  ENT:  Mouth and posterior oropharynx normal, moist mucous membranes, [place]  EYES:  EOM, conjunctivae, lids, pupils and irises normal  NECK:  No adenopathy,masses or thyromegaly  RESP:  respiratory effort and palpation of chest normal, no respiratory distress, diminished breath sounds bibasilar. Right > left  CV:  Palpation and auscultation of heart done , regular rate and rhythm, no murmur, rub, or gallop, peripheral edema 1+ in " BLE  ABDOMEN:  normal bowel sounds, soft, nontender, no hepatosplenomegaly or other masses, no guarding or rebound  M/S:   Easy stand transfers. Wheelchair bound. Dependent on staff for all mobility and transfer needs  SKIN:  Inspection of skin and subcutaneous tissue baseline, Palpation of skin and subcutaneous tissue baseline  NEURO:   Cranial nerves 2-12 are normal tested and grossly at patient's baseline, no purposeful movement in upper and lower extremities  PSYCH:  oriented X 3, memory impaired , affect and mood normal    Patient is on hospice/palliative care and labs are not recommended    Assessment/Plan:    (R53.81) Physical deconditioning  (primary encounter diagnosis)  (M62.81) Generalized muscle weakness  Comment: Acute on chronic. S/T below diagnosis  Plan:   -Continue Physical therapy and Occupational therapy  -SW to remain involve for safe discharge planning needs  -Hospice planned to start at discharge--planned for 8/11/21 tentative placement: either home with 24/7 support or [facility] setting     (C79.9) Metastatic adenocarcinoma (H)  (J91.0) Malignant pleural effusion  (J96.01) Acute respiratory failure with hypoxia (H)  (J18.9) Obstructive pneumonia  Comment: Acute on chronic. NOT AT GOAL. PROGRESSING. She presented with hypoxia and large right pleural effusion. Underwent diagnostic and therapeutic thoracentesis with negative bacterial or fungal growth to date, exudative in nature. Cytology unfortunately came back positive for likely adenocarcinoma, suspect lung primary. She was treated with levaquin and prednisone for likely post-obstructive pneumonia given her wheezing. PET CT demonstrated hypermetabolic areas upper lobe of right lung, right hilar and mediastinal lymph nodes, and scattered osseous mets (axial, appendicular, pelvis, spine). Pleurex catheter was placed 6/29 for her recurrent effusion by interventional pulmonary, with small right apical pneumothorax (non-hypoxic), that was treated with  oxygen. Oncology was consulted and kindly assisted with management. A care conference was held with Estela and her son, Sachin, who both expressed wishes for no further biopsy, no aggressive treatments at this time, and plan to transition to hospice after rehabilitation and Sachin's wedding. Code status was also discussed with patient and they expressed Full Code status currently, and will revisit this as an outpatient after family has had time to come to terms with these news.   Plan:   -Continue to monitor pain   -Monitor for worsening s/sx of concerns  -Monitor respiratory status  -Continue robitussin BID and QID PRN  -Continue oxygen 1-4L via NC to keep sats >90% and/or PRN for comfort.  -Albuterol inhaler Q 6hours scheduled  -Will restart PRN oxycodone PRN  -Will change Tylenol to BID and BID PRN  -Due to limited output,  pleurex catheter was being changed to weekly, however due to increased shortness of breath and desaturations, Changed back to twice weekly draining as directed on Tuesdays and Friday.    -Detailed written orders and patient insurance information for drainage kits have been completed and faxed to [employer], ATTN: Nette Johnson.  [employer] Customer Service can be reached at [phone]. Staff to contact her for additional supplies when needed. She was discharged with 4 bottles.   -Perform pleurex catheter dressing cares twice weekly after use. Do not remove more than 1000mL each time. if no drainage for 3 consecutive days then please contact Tonie Knott at [phone]. Per pulmonology continue this for 3 months     (E21.3) Hyperparathyroidism (H)  (E83.52) Hypercalcemia  (E04.1) Thyroid nodule  Comment: Acute on chronic. Suspect malignancy related vs primary. Family and patient would not like to pursue further treatments. Discussed the possibility of constipation in conjunction with opioids.   Plan:   -Monitor for worsening s/sx of concerns  -Hospice upon  discharge     (E87.1) Chronic hyponatremia  Comment: Baseline 130-132 for the last 2 years, stable. Serum osmolality 278, urine osmolality 699, and urine sodium 72, concern for glucocorticoid deficiency. Cosyntropin stim test with appropriate cortisol response. Likely related to malignancy.  Plan:   -Monitor for worsening s/sx of concerns  -Hospice upon discharge     (I10) Hypertension goal BP (blood pressure) < 130/80  (N18.30) Stage 3 chronic kidney disease, unspecified whether stage 3a or 3b CKD  (E78.5) Hyperlipidemia, unspecified hyperlipidemia type  Comment: Acute on chronic. Baseline creatinine~0.84-1.10. Most recent weight 223#. Based on JNC-8 goals,  patients age of 86 year old, presence of diabetes or CKD, and goals of care goal BP is  <140/90 mm Hg. Patient is stable with current plan of care and routine assessment..  Plan:   -Monitor BP and HR as directed  -Continue losartan as directed  -Continue amlodipine to 5mg daily  -Continue hydralazine to 25mg daily  -Daily weights as directed. 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 Noted in 2015.   Plan:   -Monitor BP and HR  -Continue plavix as directed  -Hospice upon discharge.     Electronically signed by:  Tasneem Silverio DNP, CHRIS

## 2021-08-04 NOTE — PROGRESS NOTES
Clinic Care Coordination Contact    Situation: Patient chart reviewed by care coordinator.    Background: Current patient at [facility].    Assessment: New diagnosis of lung cancer.  Therefore the patient will go home with hospice if able.  The patient and family have requested no further treatment.      Plan/Recommendations: Primary care coordination does not follow patient's that are in hospice.  The chart has been marked accordingly.    Care Coordination to remain available for the patient to contact in the event of future needs. No follow up planned at this time.       Eleuterio Sun MSN, RN, PHN, CCM   Primary Care Clinical RN Care Coordinator  [facility]  8/4/2021   3:33 PM  [email or web address]  Office: [phone]

## 2021-08-06 NOTE — PROGRESS NOTES
"[facility] DISCHARGE SUMMARY  PATIENT'S NAME: Bárbara Vuong  YOB: 1935  MEDICAL RECORD NUMBER:  [id number]  Place of Service where encounter took place:  [facility]  AT [facility] ([facility]) [[id number]]    PRIMARY CARE PROVIDER AND CLINIC RESPONSIBLE AFTER TRANSFER:   Janis Louis DO, [address] [facility] [address]    [facility]     Transferring providers: CHRIS Rubin CNP; Dr. Tera MD  Recent Hospitalization/ED:  [facility] stay 6/19/21 to 7/1/21.  Date of SNF Admission: July 01, 2021  Date of SNF (anticipated) Discharge: August 09, 2021  Discharged to: Home with hospice  Cognitive Scores: BIMS: 14/15 and CPT: 5.1/5.6  Physical Function: PT-working on stand pivot transfers, sit- parallel bars mod-max A, sit-stand to walker max A, standing up to 1 minute, working on strengthening and wheelchair pushups. OT-UE SBA, LE mod A due to need for UE support, socks/shoes max A (guest does not want to work on socks due to difficulty with), transfers EZ aid, toileting with RTS max A, standing up to 1 minute, CPT 5.1. Stand-pivot transfers are the goal over the next week. Discussed home safety evaluation, dalia is to have chairlift extended \"next Monday\" but still would like to look at lower level for modifications which dalia continues to be against except bars in the bathroom. Home safety eval scheduled 7/30 at 1pm. Discussed chest tube and drainage which has been decreasing significantly. Discussed discharge plan which is still wanting to discharge on hospice, referral made. Dalia lives alone in a two level home, has an adjustable bed/bed rail/wheelchair/fww/grab bars/shower bench/chair/RTS/chair lift/lift chair.   DME: SNF  coordinating DME needs     CODE STATUS/ADVANCE DIRECTIVES DISCUSSION:  Full Code. Plans to transition to DNR/DNI once officially signs onto hospice  ALLERGIES: Penicillins and Melatonin    DISCHARGE " DIAGNOSIS/NURSING FACILITY COURSE:     (R53.81) Physical deconditioning  (primary encounter diagnosis)  (M62.81) Generalized muscle weakness  Comment: Acute on chronic. S/T below diagnosis  Plan:   -Continue Physical therapy and Occupational therapy  -SW to remain involve for safe discharge planning needs  -Discharging home with hospice services toady 8/9/21     (C79.9) Metastatic adenocarcinoma (H)  (J91.0) Malignant pleural effusion  (J96.01) Acute respiratory failure with hypoxia (H)  (J18.9) Obstructive pneumonia  Comment: Acute on chronic. NOT AT GOAL. PROGRESSING. She presented with hypoxia and large right pleural effusion. Underwent diagnostic and therapeutic thoracentesis with negative bacterial or fungal growth to date, exudative in nature. Cytology unfortunately came back positive for likely adenocarcinoma, suspect lung primary. She was treated with levaquin and prednisone for likely post-obstructive pneumonia given her wheezing. PET CT demonstrated hypermetabolic areas upper lobe of right lung, right hilar and mediastinal lymph nodes, and scattered osseous mets (axial, appendicular, pelvis, spine). Pleurex catheter was placed 6/29 for her recurrent effusion by interventional pulmonary, with small right apical pneumothorax (non-hypoxic), that was treated with oxygen. Oncology was consulted and kindly assisted with management. A care conference was held with Estela and her son, Sachin, who both expressed wishes for no further biopsy, no aggressive treatments at this time, and plan to transition to hospice after rehabilitation and Sachin's wedding. Code status was also discussed with patient and they expressed Full Code status currently, and will revisit this as an outpatient after family has had time to come to terms with these news.   Plan:   -Continue to monitor pain   -Monitor for worsening s/sx of concerns  -Monitor respiratory status  -Continue robitussin BID and QID PRN  -Salon pas patch 2  Patches  daily to painful areas as directed.   -Continue oxygen 1-4L via NC to keep sats >90% and/or PRN for comfort.  -Albuterol inhaler Q 6hours scheduled  -Continue PRN oxycodone PRN  -Continue Tylenol to BID and BID PRN  -Due to limited output,  pleurex catheter was being changed to weekly, however due to increased shortness of breath and desaturations, Changed back to twice weekly draining as directed on Tuesdays and Friday.    -Detailed written orders and patient insurance information for drainage kits have been completed and faxed to [employer], ATTN: Nette Johnson.  [employer] Customer Service can be reached at [phone]. Staff to contact her for additional supplies when needed. She was discharged with 4 bottles.   -Perform pleurex catheter dressing cares twice weekly after use. Do not remove more than 1000mL each time. if no drainage for 3 consecutive days then please contact Tonie Knott at [phone]. Per pulmonology continue this for 3 months     (E21.3) Hyperparathyroidism (H)  (E83.52) Hypercalcemia  (E04.1) Thyroid nodule  Comment: Acute on chronic. Suspect malignancy related vs primary. Family and patient would not like to pursue further treatments. Discussed the possibility of constipation in conjunction with opioids.   Plan:   -Monitor for worsening s/sx of concerns  -Hospice upon discharge     (E87.1) Chronic hyponatremia  Comment: Baseline 130-132 for the last 2 years, stable. Serum osmolality 278, urine osmolality 699, and urine sodium 72, concern for glucocorticoid deficiency. Cosyntropin stim test with appropriate cortisol response. Likely related to malignancy.  Plan:   -Monitor for worsening s/sx of concerns  -Hospice upon discharge     (I10) Hypertension goal BP (blood pressure) < 130/80  (N18.30) Stage 3 chronic kidney disease, unspecified whether stage 3a or 3b CKD  (E78.5) Hyperlipidemia, unspecified hyperlipidemia type  Comment: Acute on chronic. Baseline  creatinine~0.84-1.10. Most recent weight 223#. Based on JNC-8 goals,  patients age of 86 year old, presence of diabetes or CKD, and goals of care goal BP is  <140/90 mm Hg. Patient is stable with current plan of care and routine assessment..  Plan:   -Monitor BP and HR as directed  -Continue losartan as directed  -Continue amlodipine to 5mg daily  -Continue hydralazine to 25mg daily  -Daily weights as directed. Admit weight# 235  -Discontinued crestor due to goals of care    Past Medical History:  has a past medical history of CARDIOVASCULAR SCREENING; LDL GOAL LESS THAN 130 (5/9/2010), CVA (cerebral infarction), Generalized osteoarthrosis, unspecified site, History of blood transfusion, Hypertension goal BP (blood pressure) < 130/80 (2/17/2011), 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    Discharge Medications:    Current Outpatient Medications   Medication Sig Dispense Refill     acetaminophen (TYLENOL) 500 MG tablet Take 2 tablets (1,000 mg) by mouth 2 times daily AND BID  tablet 0     albuterol (PROAIR HFA/PROVENTIL HFA/VENTOLIN HFA) 108 (90 Base) MCG/ACT inhaler Inhale 2 puffs into the lungs every 6 hours 8 g 3     amLODIPine (NORVASC) 10 MG tablet Take 0.5 tablets (5 mg) by mouth daily 90 tablet 0     Camphor-Menthol-Methyl Sal (SALONPAS) 3.1-6-10 % PTCH Externally apply 2 patches topically daily Apply to low back and right shoulder       clopidogrel (PLAVIX) 75 MG tablet TAKE 1 TABLET BY MOUTH ONCE DAILY 90 tablet 1     guaiFENesin (ROBITUSSIN) 100 MG/5ML liquid Take 10 mLs (200 mg) by mouth 2 times daily AND QID PRN 1000 mL 0     hydrALAZINE (APRESOLINE) 25 MG tablet Take 1 tablet (25 mg) by mouth daily 60 tablet 0     losartan (COZAAR) 100 MG tablet Take 1 tablet (100 mg) by mouth daily 90 tablet 1     oxyCODONE (ROXICODONE) 5 MG tablet Take 1 tablet (5 mg) by mouth every 6 hours as needed for pain 60 tablet 0     polyethylene glycol (MIRALAX) 17 g packet Take  "17 g by mouth daily as needed for constipation 30 packet 3     Medication Changes/Rationale:     See above    Controlled medications sent with patient:   Medication Oxycodone 5mg electronically prescribed to 30 tabs# pharmacy     ROS:   10 point ROS of systems including Constitutional, Eyes, Respiratory, Cardiovascular, Gastroenterology, Genitourinary, Integumentary, Musculoskeletal, Psychiatric were all negative except for pertinent positives noted in my HPI.    Physical Exam:   Vitals: BP (!) 129/91   Pulse 83   Temp 98.5  F (36.9  C)   Resp 19   Ht 1.651 m (5' 5\")   Wt 100.9 kg (222 lb 8 oz)   SpO2 94%   BMI 37.03 kg/m    BMI= Body mass index is 37.03 kg/m .  GENERAL APPEARANCE:  Alert, in no distress, morbidly obese, cooperative  ENT:  Mouth and posterior oropharynx normal, moist mucous membranes, [place]  EYES:  EOM, conjunctivae, lids, pupils and irises normal  NECK:  No adenopathy,masses or thyromegaly  RESP:  respiratory effort and palpation of chest normal, no respiratory distress, diminished breath sounds bibasilar  CV:  Palpation and auscultation of heart done , regular rate and rhythm, no murmur, rub, or gallop, no edema, +2 pedal pulses  ABDOMEN:  normal bowel sounds, soft, nontender, no hepatosplenomegaly or other masses, no guarding or rebound  M/S:   Easy stand transfers. Wheelchair bound.   SKIN:  Inspection of skin and subcutaneous tissue baseline, Palpation of skin and subcutaneous tissue baseline  NEURO:   Cranial nerves 2-12 are normal tested and grossly at patient's baseline, no purposeful movement in upper and lower extremities  PSYCH:  oriented X 3, memory impaired , affect and mood normal     SNF labs: Patient is on hospice/palliative care and labs are not recommended    DISCHARGE PLAN:    Follow up labs: No labs orders/due    Medical Follow Up:      Follow up with primary care provider in 1-2 weeks    MTM referral needed and placed by this provider: No    Current [place] scheduled " appointments:  Next 5 appointments (look out 90 days)    Sep 13, 2021  3:00 PM  Anthony Carrillo with Janis Louis DO  [facility] ([facility] ) [address] [address] [facility] [address] [phone]           Discharge Services: Home Care:  Hospice    Discharge Instructions Verbalized to Patient at Discharge:   ? Continue to follow your diet:  regular.   ? 24-hour supervision is recommended for safety.   ? drain care: drain pleurex catheter twice a week on tuesdays and fridays and PRN for any significant shortness of breath complains.  ? Detailed written orders and patient insurance information for drainage kits have been completed and faxed to [email or web address], ATTN: Ntete Johnson.  [employer] Customer Service can be reached at [phone]. Staff to contact her for additional supplies when needed. She was discharged with 4 bottles.   ? Perform pleurex catheter dressing cares twice weekly and PRN after use. Do not remove more than 1000mL each time. if no drainage for 3 consecutive days then please contact Tonie Knott at [phone]. Per pulmonology continue this for 3 months      TOTAL DISCHARGE TIME:   Greater than 30 minutes  Electronically signed by:  Tasneem Silverio DNP, APRN      Documentation of Face to Face and Certification for Home Health Services    I certify that patient: Bárbara Vuong is under my care and that I, or a nurse practitioner or physician's assistant working with me, had a face-to-face encounter that meets the physician face-to-face encounter requirements with this patient on: 8/9/2021.    This encounter with the patient was in whole, or in part, for the following medical condition, which is the primary reason for home health care: Deconditioning S/T lung cancer with mets.    I certify that, based on my findings, the following services are medically necessary home health services: Nursing and Hospice  care.    My clinical findings support the need for the above services because: Nurse is needed: To assess medication management, nursing support and hospice needs after changes in medications or other medical regimen..    Further, I certify that my clinical findings support that this patient is homebound (i.e. absences from home require considerable and taxing effort and are for medical reasons or [employer] services or infrequently or of short duration when for other reasons) because: Requires assistance of another person or specialized equipment to access medical services because patient: Is unable to operate assistive equipment on their own. and Requires supervision of another for safe transfer...    Based on the above findings. I certify that this patient is confined to the home and needs intermittent skilled nursing care, physical therapy and/or speech therapy.  The patient is under my care, and I have initiated the establishment of the plan of care.  This patient will be followed by a physician who will periodically review the plan of care.  Physician/Provider to provide follow up care: Janis Louis    Attending hospital physician (the Medicare certified [facility] provider): Dr.Yasser Tera MD, signing F2F and only signing for initial order. Please send all follow up questions and concerns or needed follow up signatures to the PCP, who Huong has on file as:  Janis Louis.    Physician Signature: See electronic signature associated with these discharge orders.  Date: 2021           Face to Face and Medical Necessity Statement for DME Provider visit     Demographic Information on Bárbara Vuong:  Gender: female  : 1935  [address] [address] [phone] (home) [phone] (work)     Medical Record: [id number]  Social Security Number: [id number]  Primary Care Provider: Janis Louis  Insurance: Payor: [facility] / Plan: UCARE MEDICARE NON [employer] PARTNERS / Product Type: HMO  /      HPI:   Bárbara Vuong is a 86 year old  (1935), who is being [facility] today [facility]; medical necessity statement for DME included. This patient requires the following:  Guest lives alone in a two level home, has an adjustable bed/bed rail/wheelchair/fww/grab bars/shower bench/chair/RTS/chair lift/lift chair.      DME Ordered and Medical Necessity Statement   Detailed written orders and patient insurance information for drainage kits have been completed and faxed to [employer], ATTN: Nette Johnson.  [employer] Customer Service can be reached at [phone]. Staff to contact her for additional supplies when needed. She was discharged with 4 bottles.      Oxygen: 1-4 L/min via NC neither continuous PRN nor needs portable tank due to mobility in home environment ; Clinical Documentation: (Obtain documented RA sat with in 2 days of discharge in acute setting or within 30 days of provider visit):  Method 1: Room air at rest: Qualifing sat level is < 88% or below on room air at rest on 7/28/21 date         Pt needing above DME with expected length of need of 99 year  due to medical necessity associated with following diagnosis:     Physical deconditioning  Generalized muscle weakness  Malignant pleural effusion  Metastatic adenocarcinoma (H)  Acute respiratory failure with hypoxia (H)  Obstructive pneumonia  Hyperparathyroidism (H)  Hypercalcemia  Chronic hyponatremia  Thyroid nodule  Hypertension goal BP (blood pressure) < 130/80  Hyperlipidemia, unspecified hyperlipidemia type  Stage 3 chronic kidney disease, unspecified whether stage 3a or 3b CKD  History of embolic stroke  CARDIOVASCULAR SCREENING; LDL GOAL LESS THAN 130  Adenocarcinoma (H)  Bone metastasis (H)  History of CVA (cerebrovascular accident)       PMH   has a past medical history of CARDIOVASCULAR SCREENING; LDL GOAL LESS THAN 130 (5/9/2010), CVA (cerebral  "infarction), Generalized osteoarthrosis, unspecified site, History of blood transfusion, Hypertension goal BP (blood pressure) < 130/80 (2/17/2011), 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     ROS:10 point ROS of systems including Constitutional, Eyes, Respiratory, Cardiovascular, Gastroenterology, Genitourinary, Integumentary, Musculoskeletal, Psychiatric were all negative except for pertinent positives noted in my HPI.     EXAM  Vitals: BP (!) 129/91   Pulse 83   Temp 98.5  F (36.9  C)   Resp 19   Ht 1.651 m (5' 5\")   Wt 100.9 kg (222 lb 8 oz)   SpO2 94%   BMI 37.03 kg/m    BMI= Body mass index is 37.03 kg/m .  GENERAL APPEARANCE:  Alert, in no distress, morbidly obese, cooperative  ENT:  Mouth and posterior oropharynx normal, moist mucous membranes, [place]  EYES:  EOM, conjunctivae, lids, pupils and irises normal  NECK:  No adenopathy,masses or thyromegaly  RESP:  respiratory effort and palpation of chest normal, no respiratory distress, diminished breath sounds bibasilar  CV:  Palpation and auscultation of heart done , regular rate and rhythm, no murmur, rub, or gallop, no edema, +2 pedal pulses  ABDOMEN:  normal bowel sounds, soft, nontender, no hepatosplenomegaly or other masses, no guarding or rebound  M/S:   Easy stand transfers. Wheelchair bound.   SKIN:  Inspection of skin and subcutaneous tissue baseline, Palpation of skin and subcutaneous tissue baseline  NEURO:   Cranial nerves 2-12 are normal tested and grossly at patient's baseline, no purposeful movement in upper and lower extremities  PSYCH:  oriented X 3, memory impaired , affect and mood normal      ASSESSMENT/PLAN:  1. Physical deconditioning    2. Generalized muscle weakness    3. Malignant pleural effusion    4. Metastatic adenocarcinoma (H)    5. Acute respiratory failure with hypoxia (H)    6. Obstructive pneumonia    7. Hyperparathyroidism (H)    8. Hypercalcemia    9. Chronic hyponatremia    10. " Thyroid nodule    11. Hypertension goal BP (blood pressure) < 130/80    12. Hyperlipidemia, unspecified hyperlipidemia type    13. Stage 3 chronic kidney disease, unspecified whether stage 3a or 3b CKD    14. History of embolic stroke    15. CARDIOVASCULAR SCREENING; LDL GOAL LESS THAN 130    16. Adenocarcinoma (H)    17. Bone metastasis (H)    18. History of CVA (cerebrovascular accident)          Orders:  1. Facility staff/TC to contact [employer] to get their order form for provider to fill out     ELECTRONICALLY SIGNED BY KEERTHI CERTIFIED PROVIDER:  CHRIS Jackson CNP   NPI: [id number]  [facility] [place] [facility]  [facility], [address]  [place] [address]

## 2021-08-09 NOTE — LETTER
"    8/9/2021        RE: Bárbara Vuong  [address] [place] [address]        [facility] DISCHARGE SUMMARY  PATIENT'S NAME: Bárbara Vuong  YOB: 1935  MEDICAL RECORD NUMBER:  [id number]  Place of Service where encounter took place:  [facility] ([facility]) [[id number]]    PRIMARY CARE PROVIDER AND CLINIC RESPONSIBLE AFTER TRANSFER:   Janis Louis DO, [address] [facility] [address]    [facility] Provider     Transferring providers: CHRIS Rubin CNP; Dr. Tera MD  Recent Hospitalization/ED:  [facility] stay 6/19/21 to 7/1/21.  Date of SNF Admission: July 01, 2021  Date of SNF (anticipated) Discharge: August 09, 2021  Discharged to: Home with hospice  Cognitive Scores: BIMS: 14/15 and CPT: 5.1/5.6  Physical Function: PT-working on stand pivot transfers, sit- parallel bars mod-max A, sit-stand to walker max A, standing up to 1 minute, working on strengthening and wheelchair pushups. OT-UE SBA, LE mod A due to need for UE support, socks/shoes max A (dalia does not want to work on socks due to difficulty with), transfers EZ aid, toileting with RTS max A, standing up to 1 minute, CPT 5.1. Stand-pivot transfers are the goal over the next week. Discussed home safety evaluation, dalia is to have chairlift extended \"next Monday\" but still would like to look at lower level for modifications which dalia continues to be against except bars in the bathroom. Home safety eval scheduled 7/30 at 1pm. Discussed chest tube and drainage which has been decreasing significantly. Discussed discharge plan which is still wanting to discharge on hospice, referral made. Dalia lives alone in a two level home, has an adjustable bed/bed rail/wheelchair/fww/grab bars/shower bench/chair/RTS/chair lift/lift chair.   DME: SNF  coordinating DME needs     CODE STATUS/ADVANCE DIRECTIVES DISCUSSION:  Full Code. Plans to transition " to DNR/DNI once officially signs onto hospice  ALLERGIES: Penicillins and Melatonin    DISCHARGE DIAGNOSIS/NURSING FACILITY COURSE:     (R53.81) Physical deconditioning  (primary encounter diagnosis)  (M62.81) Generalized muscle weakness  Comment: Acute on chronic. S/T below diagnosis  Plan:   -Continue Physical therapy and Occupational therapy  -SW to remain involve for safe discharge planning needs  -Discharging home with hospice services toady 8/9/21     (C79.9) Metastatic adenocarcinoma (H)  (J91.0) Malignant pleural effusion  (J96.01) Acute respiratory failure with hypoxia (H)  (J18.9) Obstructive pneumonia  Comment: Acute on chronic. NOT AT GOAL. PROGRESSING. She presented with hypoxia and large right pleural effusion. Underwent diagnostic and therapeutic thoracentesis with negative bacterial or fungal growth to date, exudative in nature. Cytology unfortunately came back positive for likely adenocarcinoma, suspect lung primary. She was treated with levaquin and prednisone for likely post-obstructive pneumonia given her wheezing. PET CT demonstrated hypermetabolic areas upper lobe of right lung, right hilar and mediastinal lymph nodes, and scattered osseous mets (axial, appendicular, pelvis, spine). Pleurex catheter was placed 6/29 for her recurrent effusion by interventional pulmonary, with small right apical pneumothorax (non-hypoxic), that was treated with oxygen. Oncology was consulted and kindly assisted with management. A care conference was held with Estela and her son, Sachin, who both expressed wishes for no further biopsy, no aggressive treatments at this time, and plan to transition to hospice after rehabilitation and Sachin's wedding. Code status was also discussed with patient and they expressed Full Code status currently, and will revisit this as an outpatient after family has had time to come to terms with these news.   Plan:   -Continue to monitor pain   -Monitor for worsening s/sx of  concerns  -Monitor respiratory status  -Continue robitussin BID and QID PRN  -Salon pas patch 2  Patches daily to painful areas as directed.   -Continue oxygen 1-4L via NC to keep sats >90% and/or PRN for comfort.  -Albuterol inhaler Q 6hours scheduled  -Continue PRN oxycodone PRN  -Continue Tylenol to BID and BID PRN  -Due to limited output,  pleurex catheter was being changed to weekly, however due to increased shortness of breath and desaturations, Changed back to twice weekly draining as directed on Tuesdays and Friday.    -Detailed written orders and patient insurance information for drainage kits have been completed and faxed to [employer], ATTN: Nette Johnson.  [employer] Customer Service can be reached at [phone]. Staff to contact her for additional supplies when needed. She was discharged with 4 bottles.   -Perform pleurex catheter dressing cares twice weekly after use. Do not remove more than 1000mL each time. if no drainage for 3 consecutive days then please contact Tonie Knott at [phone]. Per pulmonology continue this for 3 months     (E21.3) Hyperparathyroidism (H)  (E83.52) Hypercalcemia  (E04.1) Thyroid nodule  Comment: Acute on chronic. Suspect malignancy related vs primary. Family and patient would not like to pursue further treatments. Discussed the possibility of constipation in conjunction with opioids.   Plan:   -Monitor for worsening s/sx of concerns  -Hospice upon discharge     (E87.1) Chronic hyponatremia  Comment: Baseline 130-132 for the last 2 years, stable. Serum osmolality 278, urine osmolality 699, and urine sodium 72, concern for glucocorticoid deficiency. Cosyntropin stim test with appropriate cortisol response. Likely related to malignancy.  Plan:   -Monitor for worsening s/sx of concerns  -Hospice upon discharge     (I10) Hypertension goal BP (blood pressure) < 130/80  (N18.30) Stage 3 chronic kidney disease, unspecified whether stage 3a or 3b  CKD  (E78.5) Hyperlipidemia, unspecified hyperlipidemia type  Comment: Acute on chronic. Baseline creatinine~0.84-1.10. Most recent weight 223#. Based on JNC-8 goals,  patients age of 86 year old, presence of diabetes or CKD, and goals of care goal BP is  <140/90 mm Hg. Patient is stable with current plan of care and routine assessment..  Plan:   -Monitor BP and HR as directed  -Continue losartan as directed  -Continue amlodipine to 5mg daily  -Continue hydralazine to 25mg daily  -Daily weights as directed. Admit weight# 235  -Discontinued crestor due to goals of care    Past Medical History:  has a past medical history of CARDIOVASCULAR SCREENING; LDL GOAL LESS THAN 130 (5/9/2010), CVA (cerebral infarction), Generalized osteoarthrosis, unspecified site, History of blood transfusion, Hypertension goal BP (blood pressure) < 130/80 (2/17/2011), 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    Discharge Medications:    Current Outpatient Medications   Medication Sig Dispense Refill     acetaminophen (TYLENOL) 500 MG tablet Take 2 tablets (1,000 mg) by mouth 2 times daily AND BID  tablet 0     albuterol (PROAIR HFA/PROVENTIL HFA/VENTOLIN HFA) 108 (90 Base) MCG/ACT inhaler Inhale 2 puffs into the lungs every 6 hours 8 g 3     amLODIPine (NORVASC) 10 MG tablet Take 0.5 tablets (5 mg) by mouth daily 90 tablet 0     Camphor-Menthol-Methyl Sal (SALONPAS) 3.1-6-10 % PTCH Externally apply 2 patches topically daily Apply to low back and right shoulder       clopidogrel (PLAVIX) 75 MG tablet TAKE 1 TABLET BY MOUTH ONCE DAILY 90 tablet 1     guaiFENesin (ROBITUSSIN) 100 MG/5ML liquid Take 10 mLs (200 mg) by mouth 2 times daily AND QID PRN 1000 mL 0     hydrALAZINE (APRESOLINE) 25 MG tablet Take 1 tablet (25 mg) by mouth daily 60 tablet 0     losartan (COZAAR) 100 MG tablet Take 1 tablet (100 mg) by mouth daily 90 tablet 1     oxyCODONE (ROXICODONE) 5 MG tablet Take 1 tablet (5 mg) by mouth  "every 6 hours as needed for pain 60 tablet 0     polyethylene glycol (MIRALAX) 17 g packet Take 17 g by mouth daily as needed for constipation 30 packet 3     Medication Changes/Rationale:     See above    Controlled medications sent with patient:   Medication Oxycodone 5mg electronically prescribed to 30 tabs# pharmacy     ROS:   10 point ROS of systems including Constitutional, Eyes, Respiratory, Cardiovascular, Gastroenterology, Genitourinary, Integumentary, Musculoskeletal, Psychiatric were all negative except for pertinent positives noted in my HPI.    Physical Exam:   Vitals: BP (!) 129/91   Pulse 83   Temp 98.5  F (36.9  C)   Resp 19   Ht 1.651 m (5' 5\")   Wt 100.9 kg (222 lb 8 oz)   SpO2 94%   BMI 37.03 kg/m    BMI= Body mass index is 37.03 kg/m .  GENERAL APPEARANCE:  Alert, in no distress, morbidly obese, cooperative  ENT:  Mouth and posterior oropharynx normal, moist mucous membranes, [place]  EYES:  EOM, conjunctivae, lids, pupils and irises normal  NECK:  No adenopathy,masses or thyromegaly  RESP:  respiratory effort and palpation of chest normal, no respiratory distress, diminished breath sounds bibasilar  CV:  Palpation and auscultation of heart done , regular rate and rhythm, no murmur, rub, or gallop, no edema, +2 pedal pulses  ABDOMEN:  normal bowel sounds, soft, nontender, no hepatosplenomegaly or other masses, no guarding or rebound  M/S:   Easy stand transfers. Wheelchair bound.   SKIN:  Inspection of skin and subcutaneous tissue baseline, Palpation of skin and subcutaneous tissue baseline  NEURO:   Cranial nerves 2-12 are normal tested and grossly at patient's baseline, no purposeful movement in upper and lower extremities  PSYCH:  oriented X 3, memory impaired , affect and mood normal     SNF labs: Patient is on hospice/palliative care and labs are not recommended    DISCHARGE PLAN:    Follow up labs: No labs orders/due    Medical Follow Up:      Follow up with primary care provider in 1-2 " weeks    MT referral needed and placed by this provider: No    Current [place] scheduled appointments:  Next 5 appointments (look out 90 days)    Sep 13, 2021  3:00 PM  Anthony Carrillo with Janis Louis DO  [facility] ([facility] ) [address] [id number] [phone]           Discharge Services: Home Care:  Hospice    Discharge Instructions Verbalized to Patient at Discharge:   ? Continue to follow your diet:  regular.   ? 24-hour supervision is recommended for safety.   ? drain care: drain pleurex catheter twice a week on tuesdays and fridays and PRN for any significant shortness of breath complains.  ? Detailed written orders and patient insurance information for drainage kits have been completed and faxed to [employer], ATTN: Nette Johnson.  [employer] Customer Service can be reached at [phone]. Staff to contact her for additional supplies when needed. She was discharged with 4 bottles.   ? Perform pleurex catheter dressing cares twice weekly and PRN after use. Do not remove more than 1000mL each time. if no drainage for 3 consecutive days then please contact Tonie Knott at [phone]. Per pulmonology continue this for 3 months      TOTAL DISCHARGE TIME:   Greater than 30 minutes  Electronically signed by:  Tasneem Silverio DNP, APRN      Documentation of Face to Face and Certification for Home Health Services    I certify that patient: Bárbara Vuong is under my care and that I, or a nurse practitioner or physician's assistant working with me, had a face-to-face encounter that meets the physician face-to-face encounter requirements with this patient on: 8/9/2021.    This encounter with the patient was in whole, or in part, for the following medical condition, which is the primary reason for home health care: Deconditioning S/T lung cancer with mets.    I certify that, based on my findings, the  following services are medically necessary home health services: Nursing and Hospice care.    My clinical findings support the need for the above services because: Nurse is needed: To assess medication management, nursing support and hospice needs after changes in medications or other medical regimen..    Further, I certify that my clinical findings support that this patient is homebound (i.e. absences from home require considerable and taxing effort and are for medical reasons or [facility] services or infrequently or of short duration when for other reasons) because: Requires assistance of another person or specialized equipment to access medical services because patient: Is unable to operate assistive equipment on their own. and Requires supervision of another for safe transfer...    Based on the above findings. I certify that this patient is confined to the home and needs intermittent skilled nursing care, physical therapy and/or speech therapy.  The patient is under my care, and I have initiated the establishment of the plan of care.  This patient will be followed by a physician who will periodically review the plan of care.  Physician/Provider to provide follow up care: Janis Louis    Attending hospital physician (the Medicare certified [place] provider): Dr.Yasser Tera MD, signing F2F and only signing for initial order. Please send all follow up questions and concerns or needed follow up signatures to the PCP, who [facility] has on file as:  Janis Louis.    Physician Signature: See electronic signature associated with these discharge orders.  Date: 2021           Face to Face and Medical Necessity Statement for DME Provider visit     Demographic Information on Bárbara Vuong:  Gender: female  : 1935  [address] [address]  [phone] (home) [phone] (work)     Medical Record: [id number]  Social Security Number: [id number]  Primary Care Provider: Heriberto  Janis  Insurance: Payor: [facility] / Plan: [employer] MEDICARE NON [place] PARTNERS / Product Type: HMO /      HPI:   Bárbara Vuong is a 86 year old  (1935), who is being seen today for a face to face provider visit at [facility]; medical necessity statement for DME included. This patient requires the following:  Guest lives alone in a two level home, has an adjustable bed/bed rail/wheelchair/fww/grab bars/shower bench/chair/RTS/chair lift/lift chair.      DME Ordered and Medical Necessity Statement   Detailed written orders and patient insurance information for drainage kits have been completed and faxed to [employer], ATTN: Nette Johnson.  [employer] Customer Service can be reached at [phone]. Staff to contact her for additional supplies when needed. She was discharged with 4 bottles.      Oxygen: 1-4 L/min via NC neither continuous PRN nor needs portable tank due to mobility in home environment ; Clinical Documentation: (Obtain documented RA sat with in 2 days of discharge in acute setting or within 30 days of provider visit):  Method 1: Room air at rest: Qualifing sat level is < 88% or below on room air at rest on 7/28/21 date         Pt needing above DME with expected length of need of 99 year  due to medical necessity associated with following diagnosis:     Physical deconditioning  Generalized muscle weakness  Malignant pleural effusion  Metastatic adenocarcinoma (H)  Acute respiratory failure with hypoxia (H)  Obstructive pneumonia  Hyperparathyroidism (H)  Hypercalcemia  Chronic hyponatremia  Thyroid nodule  Hypertension goal BP (blood pressure) < 130/80  Hyperlipidemia, unspecified hyperlipidemia type  Stage 3 chronic kidney disease, unspecified whether stage 3a or 3b CKD  History of embolic stroke  CARDIOVASCULAR SCREENING; LDL GOAL LESS THAN 130  Adenocarcinoma (H)  Bone metastasis (H)  History of CVA (cerebrovascular accident)       PMH   has a past medical  "history of CARDIOVASCULAR SCREENING; LDL GOAL LESS THAN 130 (5/9/2010), CVA (cerebral infarction), Generalized osteoarthrosis, unspecified site, History of blood transfusion, Hypertension goal BP (blood pressure) < 130/80 (2/17/2011), 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     ROS:10 point ROS of systems including Constitutional, Eyes, Respiratory, Cardiovascular, Gastroenterology, Genitourinary, Integumentary, Musculoskeletal, Psychiatric were all negative except for pertinent positives noted in my HPI.     EXAM  Vitals: BP (!) 129/91   Pulse 83   Temp 98.5  F (36.9  C)   Resp 19   Ht 1.651 m (5' 5\")   Wt 100.9 kg (222 lb 8 oz)   SpO2 94%   BMI 37.03 kg/m    BMI= Body mass index is 37.03 kg/m .  GENERAL APPEARANCE:  Alert, in no distress, morbidly obese, cooperative  ENT:  Mouth and posterior oropharynx normal, moist mucous membranes, [place]  EYES:  EOM, conjunctivae, lids, pupils and irises normal  NECK:  No adenopathy,masses or thyromegaly  RESP:  respiratory effort and palpation of chest normal, no respiratory distress, diminished breath sounds bibasilar  CV:  Palpation and auscultation of heart done , regular rate and rhythm, no murmur, rub, or gallop, no edema, +2 pedal pulses  ABDOMEN:  normal bowel sounds, soft, nontender, no hepatosplenomegaly or other masses, no guarding or rebound  M/S:   Easy stand transfers. Wheelchair bound.   SKIN:  Inspection of skin and subcutaneous tissue baseline, Palpation of skin and subcutaneous tissue baseline  NEURO:   Cranial nerves 2-12 are normal tested and grossly at patient's baseline, no purposeful movement in upper and lower extremities  PSYCH:  oriented X 3, memory impaired , affect and mood normal      ASSESSMENT/PLAN:  1. Physical deconditioning    2. Generalized muscle weakness    3. Malignant pleural effusion    4. Metastatic adenocarcinoma (H)    5. Acute respiratory failure with hypoxia (H)    6. Obstructive pneumonia "    7. Hyperparathyroidism (H)    8. Hypercalcemia    9. Chronic hyponatremia    10. Thyroid nodule    11. Hypertension goal BP (blood pressure) < 130/80    12. Hyperlipidemia, unspecified hyperlipidemia type    13. Stage 3 chronic kidney disease, unspecified whether stage 3a or 3b CKD    14. History of embolic stroke    15. CARDIOVASCULAR SCREENING; LDL GOAL LESS THAN 130    16. Adenocarcinoma (H)    17. Bone metastasis (H)    18. History of CVA (cerebrovascular accident)          Orders:  1. Facility staff/TC to contact DME company to get their order form for provider to fill out     ELECTRONICALLY SIGNED BY [place] CERTIFIED PROVIDER:  CHRIS Jackson CNP   NPI: [id number]  [facility]  [address], [address]  [place] [address]                  Sincerely,        CHRIS Jackson CNP

## 2021-08-11 NOTE — TELEPHONE ENCOUNTER
Forms received from [facility]/ [facility] (order date: 8/10/21) for Janis Louis DO.  Forms placed in provider 'sign me' folder.  Please fax forms to [phone] after completion.    Supa Urbina RT (R) (ARRT)  Radiology Dept

## 2021-08-17 NOTE — TELEPHONE ENCOUNTER
Forms received from [facility]/ [facility]ice [facility] - Order #[id number] (verbal order date: 8/10/21) for Janis Louis DO.  Forms placed in provider 'sign me' folder.  Please fax forms to [phone] after completion.    Supa LAU (R) (ARRT)  Radiology Dept

## 2021-08-20 NOTE — ED TRIAGE NOTES
Patient arrived via EMS for c/o respiratory distress. Per EMS, patient developed respiratory difficulty after staff at care facility transferred patient. EMS reported O2 sats in the 70s on their arrival, CPAP started en route and switched to non-rebreather due to AMS. Hx of lung cancer on hospice.

## 2021-08-21 LAB — BACTERIA UR CULT: NORMAL

## 2021-08-21 NOTE — ED NOTES
Security came and picked up patient to bring to [facility].

## 2021-08-21 NOTE — ED NOTES
1550 - no pulse detected, CPR started.     1606 - pulse present, CPR stopped.     1633 - no pulse detected, CPR started again.     1649 - pulse present, CPR stopped.     See paper chart for code medications administered.

## 2021-08-21 NOTE — ED NOTES
Called ME- spoke with Ana. Pt is NOT a ME case. Referral # [id number]  Also spoke with Annabella from [facility]- pt is not a donor case.

## 2021-08-21 NOTE — PHARMACY-VANCOMYCIN DOSING SERVICE
Pharmacy Vancomycin Initial Note  Date of Service 2021  Patient's  1935  86 year old, female    Indication: Sepsis    Current estimated CrCl = Estimated Creatinine Clearance: 56.2 mL/min (based on SCr of 0.94 mg/dL).    Creatinine for last 3 days  2021:  3:55 PM Creatinine 0.94 mg/dL    Recent Vancomycin Level(s) for last 3 days  No results found for requested labs within last 72 hours.      Vancomycin IV Administrations (past 72 hours)      No vancomycin orders with administrations in past 72 hours.                Nephrotoxins and other renal medications (From now, onward)    Start     Dose/Rate Route Frequency Ordered Stop    21 1830  vancomycin (VANCOCIN) 2,500 mg in sodium chloride 0.9 % 500 mL intermittent infusion      2,500 mg  over 120 Minutes Intravenous ONCE 21 1828      21 1710  vasopressin 0.2 units/mL in NS (PITRESSIN) standard conc infusion      0.5-4 Units/hr  2.5-20 mL/hr  Intravenous CONTINUOUS 21 1709      21 1705  norepinephrine (LEVOPHED) 16 mg in  mL infusion MAX CONC CENTRAL LINE      0.01-0.6 mcg/kg/min × 100.9 kg  0.9-56.8 mL/hr  Intravenous CONTINUOUS 21 1700            Contrast Orders - past 72 hours (72h ago, onward)    Start     Dose/Rate Route Frequency Ordered Stop    21 1735  iopamidol (ISOVUE-370) solution 72 mL  Status:  Discontinued      72 mL Intravenous ONCE 21 1731 21 1903                Plan:  1. Start vancomycin  2500 mg IV once then intermittent dosing.   2. Vancomycin monitoring method: Trough (Method 2 = manual dose calculation)  3. Vancomycin therapeutic monitoring goal: 15-20 mg/L  4. Pharmacy will check vancomycin levels as appropriate in 1-3 Days.    5. Serum creatinine levels will be ordered daily for the first week of therapy and at least twice weekly for subsequent weeks.      Collins Trevizo [facility]

## 2021-08-21 NOTE — ED NOTES
Family at beside, able to assist with family presence.  Dr. Malone pronounced time of death @1956. Record of death form completed.

## 2021-08-21 NOTE — ED PROVIDER NOTES
ED Provider Note  [facility]      History     Chief Complaint   Patient presents with     Respiratory Distress     HPI  Bárbara Vuong is a 86 year old female who presents to the emergency department in acute respiratory distress.  EMS was called when she suddenly developed shortness of breath when someone was helping her move from her bed today.  Patient is unable to give any history due to acuity of condition.  Patient has a past history significant for stage IV lung cancer with malignant pleural effusion on right and does have a Pleurx catheter that had been placed for treatment of this.    Past Medical History  Past Medical History:   Diagnosis Date     CARDIOVASCULAR SCREENING; LDL GOAL LESS THAN 130 5/9/2010     CVA (cerebral infarction)      Generalized osteoarthrosis, unspecified site      History of blood transfusion      Hypertension goal BP (blood pressure) < 130/80 2/17/2011     IV infiltration 8/8/2015    IV access: Use right arm, prefers ultrasound with lidocaine     Lichen planus 2/17/2011     Rosacea 2/17/2011     Past Surgical History:   Procedure Laterality Date     INSERT CHEST TUBE Right 6/29/2021    Procedure: INSERTION, CATHETER, INTERCOSTAL, FOR DRAINAGE;  Surgeon: El De La O MD;  Location: [facility]     [facility] NONSPECIFIC PROCEDURE      back surgery     [facility] NONSPECIFIC PROCEDURE      elbow surgery     [facility] NONSPECIFIC PROCEDURE      hip replacement, right     [facility] NONSPECIFIC PROCEDURE      fx elbow     acetaminophen (TYLENOL) 500 MG tablet  albuterol (PROAIR HFA/PROVENTIL HFA/VENTOLIN HFA) 108 (90 Base) MCG/ACT inhaler  amLODIPine (NORVASC) 10 MG tablet  Camphor-Menthol-Methyl Sal (SALONPAS) 3.1-6-10 % PTCH  clopidogrel (PLAVIX) 75 MG tablet  guaiFENesin (ROBITUSSIN) 100 MG/5ML liquid  hydrALAZINE (APRESOLINE) 25 MG tablet  losartan (COZAAR) 100 MG tablet  oxyCODONE (ROXICODONE) 5 MG tablet  polyethylene glycol (MIRALAX) 17 g packet      Allergies   Allergen  Reactions     Penicillins      Tolerated cephalexin (18)     Melatonin      Keeps awake       Family History  Family History   Problem Relation Age of Onset     C.A.D. Mother      Hypertension Mother         AGE RELATED     Social History   Social History     Tobacco Use     Smoking status: Former Smoker     Packs/day: 1.00     Years: 7.00     Pack years: 7.00     Types: Cigarettes     Quit date: 10/26/1987     Years since quittin.8     Smokeless tobacco: Never Used   Substance Use Topics     Alcohol use: Yes     Alcohol/week: 7.0 standard drinks     Comment: one drink per night     Drug use: No      Past medical history, past surgical history, medications, allergies, family history, and social history were reviewed with the patient. No additional pertinent items.       Review of Systems  A complete review of systems was attempted but limited due to Respiratory distress and bradycardic arrest.    Physical Exam   BP: (!) 129/116  Pulse: 65  Weight: 114.3 kg (251 lb 15.8 oz) (bed scale)  SpO2: 93 %  Physical Exam  Vitals reviewed.   Constitutional:       General: She is in acute distress.      Interventions: Face mask in place.      Comments: Patient appears obtunded with gasping respirations.  She appears pale and mottled.  She does not follow commands.   HENT:      Head: Normocephalic and atraumatic.      Mouth/Throat:      Mouth: Mucous membranes are moist.   Eyes:      General: No scleral icterus.     Conjunctiva/sclera: Conjunctivae normal.      Pupils: Pupils are equal, round, and reactive to light.   Cardiovascular:      Rate and Rhythm: Bradycardia present.      Pulses: Decreased pulses.   Pulmonary:      Comments: Shallow, gasping tachypnea noted.  Right lower chest wall Pleurx catheter noted.  Abdominal:      General: There is no distension.      Palpations: Abdomen is soft. There is no pulsatile mass.      Tenderness: There is no abdominal tenderness. There is no guarding or rebound.    Musculoskeletal:         General: No swelling or signs of injury.      Cervical back: No rigidity.      Right lower leg: No edema.      Left lower leg: No edema.   Skin:     Capillary Refill: Capillary refill takes more than 3 seconds.      Coloration: Skin is pale.         ED Course      [facility]    -Intubation    Date/Time: 8/20/2021 3:50 PM  Performed by: Marlon Malone MD  Authorized by: Marlon Malone MD       PRE-PROCEDURE DETAILS     Patient status:  Unresponsive  PROCEDURE DETAILS     Preoxygenation:  Nonrebreather mask (And 15 L nasal cannula O2)    CPR in progress: yes      Intubation method:  Oral    Oral intubation technique:  Video-assisted    Laryngoscope blade:  Mac 4    Tube size (mm):  7.5    Tube type:  Cuffed    Number of attempts:  1    Tube visualized through cords: yes      PLACEMENT ASSESSMENT     ETT to lip:  23    Tube secured with:  ETT olivares    Breath sounds:  Equal    Placement verification: CXR verification and ETCO2 detector      Chest x-ray findings: Endotracheal tube near right mainstem, pulled back 2 cm and repeat chest x-ray shows better placement.  PROCEDURE   Patient Tolerance:  Patient tolerated the procedure well with no immediate complications    [facility]    -Central Line    Date/Time: 8/20/2021 6:00 PM  Performed by: Marlon Malone MD  Authorized by: Marlon Malone MD        ANESTHESIA    Anesthesia: Local infiltration  Local Anesthetic:  Lidocaine 1% without epinephrine      PRE-PROCEDURE DETAILS:     Hand hygiene: Hand hygiene performed prior to insertion      Sterile barrier technique: All elements of maximal sterile technique followed      Skin preparation:  ChloraPrep    Skin preparation agent: Skin preparation agent completely dried prior to procedure      PROCEDURE DETAILS:     Location:  R internal jugular    Patient position:   Trendelenburg    Procedural supplies:  Triple lumen    Landmarks identified: yes      Ultrasound guidance: yes      Sterile ultrasound techniques: Sterile gel and sterile probe covers were used      Number of attempts:  1    Successful placement: yes      POST PROCEDURE DETAILS:      Post-procedure:  Dressing applied and line sutured    Assessment:  Blood return through all ports, no pneumothorax on x-ray, free fluid flow and placement verified by x-ray              EKG Interpretation:      Interpreted by Marlon Malone MD    Symptoms at time of EKG: Resuscitation from bradycardic arrest  Rhythm: sinus tachycardia  Rate: 103  Ectopy: none  Conduction: right bundle branch block (incomplete)  ST Segments/ T Waves: ST Segment Depression V3, V4 and V5, low voltage QRS noted  Q Waves: none  Comparison to prior: Patient now presents with some ST depression in incomplete bundle branch block pattern concerning for right heart strain    Clinical Impression: non-specific EKG, incomplete right bundle branch block, findings somewhat concerning for right heart strain              Critical Care Addendum    My initial assessment, based on my review of prehospital provider report, review of nursing observations, review of vital signs, physical exam and review of cardiac rhythm monitor, established that Bárbara Vuong has cardiac arrest and respiratory failure, which requires immediate intervention, and therefore she is critically ill.     After the initial assessment, the care team initiated multiple lab tests, initiated IV fluid administration, initiated medication therapy with ACLS medications as well as multiple pressors, achieved central venous access, performed advanced airway management and consulted with ICU to provide stabilization care. Due to the critical nature of this patient, I reassessed nursing observations, vital signs, physical exam, review of cardiac rhythm monitor and mental status multiple times prior  to her disposition.     Time also spent performing documentation, discussion with family to obtain medical information for decision making, reviewing test results, discussion with consultants and coordination of care.     Critical care time (excluding teaching time and procedures): 180 minutes.   The Lactic acid level is elevated due to cardiac arrest, at this time there is no sign of severe sepsis or septic shock.     Results for orders placed or performed during the hospital encounter of 08/20/21   XR Chest Port 1 View     Status: None    Narrative    Portable chest    INDICATION: ETT placement    COMPARISON: 6/29/2021    FINDINGS: Endotracheal tube encroaches just at the origin of the right  mainstem bronchus and should be retracted by approximately 3-4 cm.  Pleural effusion on the right. Enteric tube tip is in the stomach.  Partial imaging of bilateral shoulder prostheses. Increased effusion  or atelectasis in the right apex.      Impression    IMPRESSION: Recommend retraction of endotracheal tube approximately  3-4 cm given its encroaching into the right mainstem bronchus.  Increased right effusion and/or atelectasis.    PATRIA NAVARRO MD         SYSTEM ID:  [id number]   XR Chest Port 1 View     Status: None    Narrative    EXAM: XR CHEST PORT 1 VIEW  8/20/2021 5:42 PM     HISTORY:  verify central line placement       COMPARISON:  Chest x-ray from same day at 1625 hours    FINDINGS:   Frontal radiograph of the chest. Endotracheal tube tip projects over  the midthoracic trachea. Enteric tube tip is out of the field of view,  the side hole is within the stomach. Right internal jugular central  venous catheter tip projects over the high SVC. Right basilar chest  tube.    Trachea is midline. Ill-defined cardiac silhouette. Grossly unchanged  right pleural effusion with atelectasis of the right upper lobe. Small  left pleural effusion. Increased mixed opacities in the left mid to  upper lung zone. No appreciable  pneumothorax.      Impression    IMPRESSION:   1. New right internal jugular central venous catheter tip projects  over the high SVC.  2. Retracted endotracheal tube tip now projects over the midthoracic  trachea.  3. Increased mixed opacities in the left mid-upper lung zone.    I have personally reviewed the examination and initial interpretation  and I agree with the findings.    TREVOR BARROSO MD         SYSTEM ID:  [id number]   CBC with platelets differential     Status: Abnormal    Narrative    The following orders were created for panel order CBC with platelets differential.  Procedure                               Abnormality         Status                     ---------                               -----------         ------                     CBC with platelets and d...[[id number]]  Abnormal            Final result               Manual Differential[[id number]]          Abnormal            Final result                 Please view results for these tests on the individual orders.   Comprehensive metabolic panel     Status: Abnormal   Result Value Ref Range    Sodium 130 (L) 133 - 144 mmol/L    Potassium 5.4 (H) 3.4 - 5.3 mmol/L    Chloride 99 94 - 109 mmol/L    Carbon Dioxide (CO2) 14 (L) 20 - 32 mmol/L    Anion Gap 17 (H) 3 - 14 mmol/L    Urea Nitrogen 20 7 - 30 mg/dL    Creatinine 0.94 0.52 - 1.04 mg/dL    Calcium 8.9 8.5 - 10.1 mg/dL    Glucose 247 (H) 70 - 99 mg/dL    Alkaline Phosphatase 97 40 - 150 U/L     (H) 0 - 45 U/L     (H) 0 - 50 U/L    Protein Total 5.2 (L) 6.8 - 8.8 g/dL    Albumin 1.7 (L) 3.4 - 5.0 g/dL    Bilirubin Total 0.2 0.2 - 1.3 mg/dL    GFR Estimate 56 (L) >60 mL/min/1.73m2   Troponin I     Status: Abnormal   Result Value Ref Range    Troponin I 0.494 (HH) 0.000 - 0.045 ug/L   BNP     Status: Abnormal   Result Value Ref Range    N terminal Pro BNP Inpatient 2,383 (H) 0-1,800 pg/mL   Magnesium     Status: Normal   Result Value Ref Range    Magnesium 2.1 1.6 - 2.3 mg/dL    iStat Gases Electrolytes ICA Glucose Venous, POCT     Status: Abnormal   Result Value Ref Range    CPB Applied No     Hematocrit POCT 37 35 - 47 %    Calcium, Ionized Whole Blood POCT 5.7 (H) 4.4 - 5.2 mg/dL    Glucose Whole Blood POCT 267 (H) 70 - 99 mg/dL    Bicarbonate Venous POCT 20 (L) 21 - 28 mmol/L    Hemoglobin POCT 12.6 11.7 - 15.7 g/dL    Potassium POCT 6.3 (HH) 3.4 - 5.3 mmol/L    Sodium POCT 124 (L) 133 - 144 mmol/L    pCO2 Venous POCT 73 (H) 40 - 50 mm Hg    pO2 Venous POCT 63 (H) 25 - 47 mm Hg    pH Venous POCT 7.04 (LL) 7.32 - 7.43    O2 Sat, Venous POCT 79 (L) 94 - 100 %   iStat Gases (lactate) arterial, POCT     Status: Abnormal   Result Value Ref Range    Bicarbonate Arterial POCT 20 (L) 21 - 28 mmol/L    Lactic Acid POCT 7.3 (HH) <=2.0 mmol/L    O2 Sat, Arterial POCT 78 (L) 92 - 100 %    pCO2 Arterial POCT 77 (HH) 35 - 45 mm Hg    PH Arterial POCT 7.03 (LL) 7.35 - 7.45    pO2 Arterial POCT 63 (L) 80 - 105 mm Hg   CBC with platelets and differential     Status: Abnormal   Result Value Ref Range    WBC Count 19.5 (H) 4.0 - 11.0 10e3/uL    RBC Count 3.85 3.80 - 5.20 10e6/uL    Hemoglobin 10.7 (L) 11.7 - 15.7 g/dL    Hematocrit 34.9 (L) 35.0 - 47.0 %    MCV 91 78 - 100 fL    MCH 27.8 26.5 - 33.0 pg    MCHC 30.7 (L) 31.5 - 36.5 g/dL    RDW 14.5 10.0 - 15.0 %    Platelet Count 384 150 - 450 10e3/uL   Extra Blue Top Tube     Status: None   Result Value Ref Range    Hold Specimen JIC    Extra Red Top Tube     Status: None   Result Value Ref Range    Hold Specimen JI    Blood gas arterial (ABG)     Status: Abnormal   Result Value Ref Range    pH Arterial 7.07 (LL) 7.35 - 7.45    pCO2 Arterial 68 (H) 35 - 45 mm Hg    pO2 Arterial 131 (H) 80 - 105 mm Hg    FIO2 100     Bicarbonate Arterial 19 (L) 21 - 28 mmol/L    Base Excess/Deficit (+/-) -11.3 (L) -9.0 - 1.8 mmol/L    Lactic Acid 7.5 (HH) 0.7 - 2.0 mmol/L   Blood gas arterial (ABG)     Status: Abnormal   Result Value Ref Range    pH Arterial 7.13 (LL)  7.35 - 7.45    pCO2 Arterial 59 (H) 35 - 45 mm Hg    pO2 Arterial 76 (L) 80 - 105 mm Hg    FIO2 100     Bicarbonate Arterial 20 (L) 21 - 28 mmol/L    Base Excess/Deficit (+/-) -9.6 (L) -9.0 - 1.8 mmol/L    Lactic Acid 8.7 (HH) 0.7 - 2.0 mmol/L   Asymptomatic COVID-19 Virus (Coronavirus) by PCR Nasopharyngeal     Status: Normal    Specimen: Nasopharyngeal; Swab   Result Value Ref Range    SARS CoV2 PCR Negative Negative    Narrative    Testing was performed using the Xpert Xpress SARS-CoV-2 Assay on the  Cepheid Gene-Xpert Instrument Systems. Additional information about  this Emergency Use Authorization (EUA) assay can be found via the Lab  Guide. This test should be ordered for the detection of SARS-CoV-2 in  individuals who meet SARS-CoV-2 clinical and/or epidemiological  criteria. Test performance is unknown in asymptomatic patients. This  test is for in vitro diagnostic use under the FDA EUA for  laboratories certified under CLIA to perform high complexity testing.  This test has not been FDA cleared or approved. A negative result  does not rule out the presence of PCR inhibitors in the specimen or  target RNA in concentration below the limit of detection for the  assay. The possibility of a false negative should be considered if  the patient's recent exposure or clinical presentation suggests  COVID-19. This test was validated by the [facility]. This laboratory is certified under  the Clinical Laboratory Improvement Amendments of 1988 (CLIA-88) as  qualified to perform high complexity laboratory testing.     UA with Microscopic reflex to Culture     Status: Abnormal    Specimen: Urine, Bobby Catheter   Result Value Ref Range    Color Urine Yellow Colorless, Straw, Light Yellow, Yellow    Appearance Urine Cloudy (A) Clear    Glucose Urine Negative Negative mg/dL    Bilirubin Urine Negative Negative    Ketones Urine Negative Negative mg/dL    Specific Gravity  Urine 1.012 1.003 - 1.035    Blood Urine Small (A) Negative    pH Urine 6.0 5.0 - 7.0    Protein Albumin Urine 20  (A) Negative mg/dL    Urobilinogen Urine Normal Normal, 2.0 mg/dL    Nitrite Urine Negative Negative    Leukocyte Esterase Urine Large (A) Negative    Bacteria Urine Many (A) None Seen /HPF    WBC Clumps Urine Present (A) None Seen /HPF    Mucus Urine Present (A) None Seen /LPF    RBC Urine 4 (H) <=2 /HPF    WBC Urine 125 (H) <=5 /HPF    Squamous Epithelials Urine 2 (H) <=1 /HPF    Narrative    Urine Culture ordered based on laboratory criteria   Manual Differential     Status: Abnormal   Result Value Ref Range    % Neutrophils 67 %    % Lymphocytes 22 %    % Monocytes 3 %    % Eosinophils 5 %    % Basophils 1 %    % Myelocytes 2 %    NRBCs per 100 WBC 1 (H) <=0 %    Absolute Neutrophils 13.1 (H) 1.6 - 8.3 10e3/uL    Absolute Lymphocytes 4.3 0.8 - 5.3 10e3/uL    Absolute Monocytes 0.6 0.0 - 1.3 10e3/uL    Absolute Eosinophils 1.0 (H) 0.0 - 0.7 10e3/uL    Absolute Basophils 0.2 0.0 - 0.2 10e3/uL    Absolute Myelocytes 0.4 (H) <=0.0 10e3/uL    Absolute NRBCs 0.2 (H) <=0.0 10e3/uL    RBC Morphology Confirmed RBC Indices     Platelet Assessment  Automated Count Confirmed. Platelet morphology is normal.     Automated Count Confirmed. Platelet morphology is normal.    Shonna Cells Marked (A) None Seen   EKG 12 lead     Status: None (Preliminary result)   Result Value Ref Range    Systolic Blood Pressure  mmHg    Diastolic Blood Pressure  mmHg    Ventricular Rate 103 BPM    Atrial Rate 103 BPM    IL Interval 374 ms    QRS Duration 98 ms     ms    QTc 385 ms    P Axis 65 degrees    R AXIS 155 degrees    T Axis -43 degrees    Interpretation ECG       Sinus tachycardia with 1st degree A-V block with Fusion complexes  Indeterminate axis  Pulmonary disease pattern  Incomplete right bundle branch block  Possible Right ventricular hypertrophy  Inferior infarct , age undetermined  Abnormal ECG     Extra Tube  ([place] Draw)     Status: None    Narrative    The following orders were created for panel order Extra Tube ([place] Draw).  Procedure                               Abnormality         Status                     ---------                               -----------         ------                     Extra Blue Top Tube[[id number]]                              Final result               Extra Red Top Tube[[id number]]                               Final result                 Please view results for these tests on the individual orders.     Medications   propofol (DIPRIVAN) infusion (0 mcg/kg/min × 100.9 kg Intravenous Stopped 8/20/21 1644)   EPINEPHrine (ADRENALIN) 5 mg in sodium chloride 0.9 % 250 mL infusion (0 mcg/kg/min × 100.9 kg Intravenous Stopped 8/20/21 1928)   norepinephrine (LEVOPHED) 16 mg in  mL infusion MAX CONC CENTRAL LINE (0 mcg/kg/min × 100.9 kg Intravenous Stopped 8/20/21 1928)   vasopressin 0.2 units/mL in NS (PITRESSIN) standard conc infusion (0 Units/hr Intravenous Stopped 8/20/21 1928)   ceFEPIme (MAXIPIME) 2 g vial to attach to  ml bag for ADULTS or 50 ml bag for PEDS (2 g Intravenous Not Given 8/20/21 1858)   vancomycin (VANCOCIN) 2,500 mg in sodium chloride 0.9 % 500 mL intermittent infusion (2,500 mg Intravenous Not Given 8/20/21 1904)   fentaNYL (SUBLIMAZE) infusion (25 mcg/hr Intravenous Not Given 8/20/21 1949)   fentaNYL (PF) (SUBLIMAZE) injection 50 mcg (50 mcg Intravenous Given 8/20/21 1941)     And   fentaNYL (PF) (SUBLIMAZE) injection 50 mcg (has no administration in time range)   LORazepam (ATIVAN) 1 mg/mL in D5W 50 mL (0.5 mg/hr Intravenous Not Given 8/20/21 1950)   glycopyrrolate (ROBINUL) injection 0.1 mg (has no administration in time range)   vancomycin place olivares - receiving intermittent dosing (has no administration in time range)   rocuronium injection 100 mg (100 mg Intravenous Given 8/20/21 1600)   midazolam (VERSED) injection 4 mg (4 mg Intravenous Given  8/20/21 1600)   0.9% sodium chloride BOLUS (0 mLs Intravenous Stopped 8/20/21 1820)        Assessments & Plan (with Medical Decision Making)     This patient presented to the emergency department for shortness of breath and at presentation was bradycardic and suffered a bradycardic arrest.  She was immediately assessed upon presentation and was intubated.  She had an IO in place and peripheral line was placed in the left antecubital fossa.  She was given epinephrine and atropine.  End-tidal CO2 was followed and was noted to increase and patient was noted to have palpable pulse at pulse check with a perfusing tachycardic rhythm.  Endotracheal tube was noted to be placed near the right mainstem bronchus so this was retracted 2 cm resulted in easier ventilation.  Patient had to more subsequent bradycardic arrests during which time CPR was performed with Frank device and she received 2 rounds of epinephrine, atropine, calcium and bicarb and would regain pulses.  She was started on epinephrine drip and I placed a central line.  Levophed and vasopressin drips were added on to help maintain heart rate and blood pressure.  My suspicion is that patient had a massive pulmonary embolism.  She is noted to be having blood from her endotracheal tube most likely secondary to trauma from compressions.  I had multiple discussions with family regarding the patient's condition and also spoke with the ICU.  At presentation she was full code, but given her continued instability despite maxing out on 3 pressors we feel that this is not a survivable event.  Without sedation the patient is noted to have minimally reactive pupils, no corneal or gag reflex.  ICU staff spoke with the patient's family and we elected to withdraw care after last rights were performed.  Of note, bedside ultrasound done did show evidence of right heart strain and twelve-lead EKG does have some changes suggestive of right heart strain all increasing suspicion for  massive pulmonary embolism.  Given her comorbid conditions, lytics or heparin would probably cause more harm and suffering as she is already having blood being suctioned out of her endotracheal tube on a regular basis.  After last rights had been performed, pressures were stopped, patient failed an apnea test at bedside and she was extubated.  Blood pressure and heart rate rapidly fell and patient was pronounced dead at 1956.    I have reviewed the nursing notes. I have reviewed the findings, diagnosis, plan and need for follow up with the patient.    New Prescriptions    No medications on file       Final diagnoses:   Acute respiratory failure with hypoxia (H)   Cardiac arrest (H)       --  Marlon Malone  [facility] EMERGENCY DEPARTMENT  8/20/2021     Marlon Malone MD  08/20/21 2044

## 2021-08-21 NOTE — ED NOTES
Donor called, spoke with [employer]. Pt is not a candidate for organ, tissue or eyes. Referral #[id number]

## 2021-08-22 LAB
ATRIAL RATE - MUSE: 103 BPM
DIASTOLIC BLOOD PRESSURE - MUSE: NORMAL MMHG
INTERPRETATION ECG - MUSE: NORMAL
P AXIS - MUSE: 65 DEGREES
PR INTERVAL - MUSE: 374 MS
QRS DURATION - MUSE: 98 MS
QT - MUSE: 294 MS
QTC - MUSE: 385 MS
R AXIS - MUSE: 155 DEGREES
SYSTOLIC BLOOD PRESSURE - MUSE: NORMAL MMHG
T AXIS - MUSE: -43 DEGREES
VENTRICULAR RATE- MUSE: 103 BPM

## 2021-08-24 ENCOUNTER — TELEPHONE (OUTPATIENT)
Dept: FAMILY MEDICINE | Facility: CLINIC | Age: 86
End: 2021-08-24

## 2021-08-24 NOTE — TELEPHONE ENCOUNTER
This patients death cert is not in my work que.  I just did another one today.       Janis Louis D.O.

## 2021-08-24 NOTE — TELEPHONE ENCOUNTER
Medical examiner wondering if PCP will sign death certificate.  Patient  23    Call  ok to amanda Swanson/  [place]

## 2021-08-24 NOTE — TELEPHONE ENCOUNTER
Called medical examiner's office.  They said anytime a patient dies in the ED they have to notify the Provider.  The Med Examiner's office will be sending Death Cert to sign.  Confirmed current medications as well as Provider  Tasneem PEREZ CNP, who had been prescribing medication and seeing patient more frequently than Dr Louis.      Piedad Dong RN

## 2021-08-26 ENCOUNTER — TELEPHONE (OUTPATIENT)
Dept: FAMILY MEDICINE | Facility: CLINIC | Age: 86
End: 2021-08-26

## 2021-08-26 NOTE — TELEPHONE ENCOUNTER
Forms received from [employer]/ Home Health Cert. and Plan of Care/ Cert. Period: 8/9/2021-11/6/2021 for Janis Louis DO.  Forms placed in provider 'sign me' folder.  Please fax forms to [phone] after completion.    SAULO DAVIS (R)  Radiology Department

## 2021-08-27 DIAGNOSIS — Z53.9 DIAGNOSIS NOT YET DEFINED: Primary | ICD-10-CM

## 2021-08-27 LAB
BACTERIA BLD CULT: ABNORMAL

## 2021-08-27 PROCEDURE — 99207 PR MD CERTIFICATION HHA PATIENT: CPT | Performed by: FAMILY MEDICINE

## 2024-07-15 ENCOUNTER — TELEPHONE (OUTPATIENT)
Dept: FAMILY MEDICINE | Facility: CLINIC | Age: 89
End: 2024-07-15
Payer: COMMERCIAL

## 2024-07-15 NOTE — TELEPHONE ENCOUNTER
Forms received from [facility]/ Hospice Cert and Plan of Care (08/09/21 - 11/06/21) for Dr Louis.  Forms placed in provider 'sign me' folder.  Please fax forms to [phone] after completion.    GREGORIO Rod  [facility]

## 2025-04-07 NOTE — PROGRESS NOTES
[facility]  Progress Note - [facility] Service   Date of Admission: 6/19/2021     Assessment and Plan:   Bárbara Vuong is a 86 year old female admitted on 6/19/2021. She has a history of carotid stenosis, embolic CVA, CKD 3, HTN presenting with acute on progressive dyspnea found to have acute hypoxic respiratory failure, wheezing with imaging revealing a large right pleural effusion, workup pending for cardiac vs pulmonary etiology.     Today:  - Thoracentesis with labs for further diagnostic evaluation of dyspnea, prelim labs suggesting exudative  - Begin looking for TCU upon discharge      # Acute on likely chronic hypoxic respiratory failure   # Large R pleural effusion   Progressive SOB within the year, worsening subacutely last 6 weeks, now with acute worsening in the last 2 weeks. BNP negative, echocardiogram with high normal EF 65-70%, unable to assess diastolic dysfunction. Ddx remains broad and can include malignancy with adjacent pleural effusion, postobstructive pneumonia (though time course does not match), ILD, longer term infectious disease (fungal or atypical). Does have a 7 pack year smoking history though remote, less likely COPD, though no PFTs on file.   - Diagnostic/therapeutic thoracentesis with cell count/diff, culture (aerobic, anaerobic, fungal), cytology, gram stain, protein, LDH as well as serum LDH, protein  - Duonebs QID  - Colonoscopy in 2011 without evidence of malignancy    # Mild hypercalcemia  Likely due to inactivity, though will add on iCa and PTH to evaluate for possible hypercalcemia of malignancy or other  - PTH, iCa     # Weakness  Likely from hypoxia and respiratory concerns  - PT/OT recommending TCU  - Falls precaution      # Chronic hyponatremia: Baseline 130-132 for the last 2 years, stable, unclear etiology, possibly dehydration and unmeasured fluid loss with increased ADH response    # CKD: stable (baseline 1.0)     # History of hypertension  "  - PTA amlodipine 10mg daily  - PTA hydralazine 37.5mg daily  - PTA losartan 100mg daily   - PTA Crestor 5mg every other day      # History of Embolic CVA  - Hold PTA Plavix 75mg daily prior to thoracentesis, restart after     # 3cm pulmonary nodule, MAGGIE   - PCP follow-up, to discuss risks vs benefits given incidental finding      # 1.1cm hypodense thyroid nodule, left  - Outpatient follow-up, needs ultrasound        Diet: Combination Diet Low Saturated Fat Na <2400mg Diet, No Caffeine Diet  Fluid restriction 2000 ML FLUID  Fluids: PO  DVT Prophylaxis: Pneumatic Compression Devices, likely start lovenox tomorrow post procedure   Bobby Catheter: not present  Code Status: Full Code confirmed with patient 6/19/21     Bao Hinds MD  Internal Medicine, PGY-1  Pager [phone]       Subjective:   Feeling the \"same\" as yesterday, does not feel lasix or IV methylpred helped with breathing symptoms. Does feel better on nasal cannula oxygen, states she does not feel comfortable walking as she feels she will collapse and not be able to tolerate with her shortness of breath.     4-point ROS reviewed and was negative except as stated otherwise above.     Objective:    Temp:  [95.7  F (35.4  C)-97.7  F (36.5  C)] 95.7  F (35.4  C)  Pulse:  [] 95  Resp:  [18-24] 21  BP: (123-156)/(65-90) 156/79  SpO2:  [90 %-100 %] 96 %    PHYSICAL EXAM:    General Appearance: alert, oriented, comfortable appearing, on oxygen by nasal cannula  Respiratory: reduced lung sounds R base, tight airways throughout otherwise  Cardiovascular: no JVD while sitting up in chair, RRR with no murmurs  GI: soft, obese, non-tender to palpation, non-distended  Skin: no rashes on exposed skin   Musculoskeletal: trace LE edema  Neurologic: alert and oriented, CN 2-12 grossly intact  Psychiatric: mood good, talkative, congruent affect     Data reviewed today: I reviewed all new lab and imaging results over the last 24 hours.   " Additional Notes: Patient states she has arthritis in her hands and feet. 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 Detail Level: Detailed Render Risk Assessment In Note?: no

## (undated) RX ORDER — LIDOCAINE HYDROCHLORIDE 10 MG/ML
INJECTION, SOLUTION EPIDURAL; INFILTRATION; INTRACAUDAL; PERINEURAL
Status: DISPENSED
Start: 2021-01-01

## (undated) RX ORDER — FENTANYL CITRATE 50 UG/ML
INJECTION, SOLUTION INTRAMUSCULAR; INTRAVENOUS
Status: DISPENSED
Start: 2021-01-01